# Patient Record
Sex: FEMALE | Race: WHITE | NOT HISPANIC OR LATINO | Employment: FULL TIME | URBAN - METROPOLITAN AREA
[De-identification: names, ages, dates, MRNs, and addresses within clinical notes are randomized per-mention and may not be internally consistent; named-entity substitution may affect disease eponyms.]

---

## 2018-03-30 ENCOUNTER — HOSPITAL ENCOUNTER (OUTPATIENT)
Dept: GENERAL RADIOLOGY | Facility: HOSPITAL | Age: 58
Discharge: HOME | End: 2018-03-30
Attending: FAMILY MEDICINE | Admitting: FAMILY MEDICINE

## 2018-11-06 ENCOUNTER — HOSPITAL ENCOUNTER (OUTPATIENT)
Dept: OTHER | Facility: HOSPITAL | Age: 58
Discharge: HOME | End: 2018-11-06
Attending: FAMILY MEDICINE | Admitting: FAMILY MEDICINE

## 2018-11-19 ENCOUNTER — HOSPITAL ENCOUNTER (OUTPATIENT)
Dept: MAMMOGRAPHY | Facility: HOSPITAL | Age: 58
Discharge: HOME | End: 2018-11-19
Attending: FAMILY MEDICINE | Admitting: FAMILY MEDICINE

## 2018-12-12 ENCOUNTER — HOSPITAL ENCOUNTER (OUTPATIENT)
Dept: MAMMOGRAPHY | Facility: HOSPITAL | Age: 58
Discharge: HOME | End: 2018-12-12
Attending: FAMILY MEDICINE | Admitting: FAMILY MEDICINE

## 2019-01-29 ENCOUNTER — HOSPITAL ENCOUNTER (OUTPATIENT)
Dept: MRI IMAGING | Facility: HOSPITAL | Age: 59
Discharge: HOME | End: 2019-01-29
Attending: SURGERY | Admitting: SURGERY

## 2019-02-05 ENCOUNTER — HOSPITAL ENCOUNTER (OUTPATIENT)
Dept: OTHER | Facility: HOSPITAL | Age: 59
Discharge: HOME | End: 2019-02-05
Attending: SURGERY | Admitting: SURGERY

## 2019-02-12 ENCOUNTER — HOSPITAL ENCOUNTER (OUTPATIENT)
Dept: ONCOLOGY | Facility: HOSPITAL | Age: 59
Discharge: HOME | End: 2019-02-12
Attending: INTERNAL MEDICINE | Admitting: INTERNAL MEDICINE

## 2019-02-12 ENCOUNTER — HOSPITAL ENCOUNTER (OUTPATIENT)
Dept: ONCOLOGY | Facility: CLINIC | Age: 59
Setting detail: INFUSION SERIES
Discharge: HOME | End: 2019-02-12
Attending: INTERNAL MEDICINE | Admitting: INTERNAL MEDICINE

## 2019-02-12 ENCOUNTER — CLINICAL SUPPORT (OUTPATIENT)
Dept: ONCOLOGY | Facility: HOSPITAL | Age: 59
End: 2019-02-12

## 2019-02-12 LAB
ALBUMIN SERPL-MCNC: 4.2 G/DL (ref 3.5–4.8)
ALBUMIN/GLOB SERPL: 1.9 {RATIO} (ref 1–1.7)
ALP SERPL-CCNC: 54 IU/L (ref 32–91)
ALT SERPL-CCNC: 19 IU/L (ref 14–54)
ANION GAP SERPL CALC-SCNC: 14.3 MMOL/L (ref 10–20)
AST SERPL-CCNC: 24 IU/L (ref 15–41)
BILIRUB SERPL-MCNC: 0.5 MG/DL (ref 0.3–1.2)
BUN SERPL-MCNC: 12 MG/DL (ref 8–20)
BUN/CREAT SERPL: 10.9 (ref 5.4–26.2)
CALCIUM SERPL-MCNC: 9.7 MG/DL (ref 8.9–10.3)
CHLORIDE SERPL-SCNC: 100 MMOL/L (ref 101–111)
CONV CO2: 27 MMOL/L (ref 22–32)
CONV TOTAL PROTEIN: 6.4 G/DL (ref 6.1–7.9)
CREAT UR-MCNC: 1.1 MG/DL (ref 0.4–1)
GLOBULIN UR ELPH-MCNC: 2.2 G/DL (ref 2.5–3.8)
GLUCOSE SERPL-MCNC: 92 MG/DL (ref 65–99)
POTASSIUM SERPL-SCNC: 4.3 MMOL/L (ref 3.6–5.1)
SODIUM SERPL-SCNC: 137 MMOL/L (ref 136–144)

## 2019-02-12 NOTE — PROGRESS NOTES
PATIENTS ONCOLOGY RECORD LOCATED IN [facility]      Subjective     Name:  JYOTI PHAM     Date:  2019  Address:  [address] [place] [address] Dr SHANIQUA ADAMES IN [facility]  Home: [unfilled]  :  1960 AGE:  58 y.o.        RECORDS OBTAINED:  Patients Oncology Record is located in [facility]

## 2019-02-25 ENCOUNTER — HOSPITAL ENCOUNTER (OUTPATIENT)
Dept: ONCOLOGY | Facility: CLINIC | Age: 59
Setting detail: INFUSION SERIES
Discharge: HOME | End: 2019-02-25
Attending: INTERNAL MEDICINE | Admitting: INTERNAL MEDICINE

## 2019-02-25 ENCOUNTER — CLINICAL SUPPORT (OUTPATIENT)
Dept: ONCOLOGY | Facility: HOSPITAL | Age: 59
End: 2019-02-25

## 2019-02-25 NOTE — PROGRESS NOTES
PATIENTS ONCOLOGY RECORD LOCATED IN [facility]      Subjective     Name:  JYOTI PHAM     Date:  2019  Address:  [address] [place] [address] Dr SHANIQUA ADAMES IN [facility]  Home: [unfilled]  :  1960 AGE:  58 y.o.        RECORDS OBTAINED:  Patients Oncology Record is located in [facility]

## 2019-03-05 ENCOUNTER — HOSPITAL ENCOUNTER (OUTPATIENT)
Dept: PREADMISSION TESTING | Facility: HOSPITAL | Age: 59
Discharge: HOME | End: 2019-03-05
Attending: SURGERY | Admitting: SURGERY

## 2019-03-05 LAB
ANION GAP SERPL CALC-SCNC: 12.6 MMOL/L (ref 10–20)
BACTERIA SPEC AEROBE CULT: NORMAL
BASOPHILS # BLD AUTO: 0 10*3/UL (ref 0–0.2)
BASOPHILS NFR BLD AUTO: 1 % (ref 0–2)
BUN SERPL-MCNC: 13 MG/DL (ref 8–20)
BUN/CREAT SERPL: 13 (ref 5.4–26.2)
CALCIUM SERPL-MCNC: 9.8 MG/DL (ref 8.9–10.3)
CHLORIDE SERPL-SCNC: 105 MMOL/L (ref 101–111)
CONV CO2: 27 MMOL/L (ref 22–32)
CREAT UR-MCNC: 1 MG/DL (ref 0.4–1)
DIFFERENTIAL METHOD BLD: (no result)
EOSINOPHIL # BLD AUTO: 0.2 10*3/UL (ref 0–0.3)
EOSINOPHIL # BLD AUTO: 2 % (ref 0–3)
ERYTHROCYTE [DISTWIDTH] IN BLOOD BY AUTOMATED COUNT: 14.5 % (ref 11.5–14.5)
GLUCOSE SERPL-MCNC: 90 MG/DL (ref 65–99)
HCT VFR BLD AUTO: 42.5 % (ref 35–49)
HGB BLD-MCNC: 14.2 G/DL (ref 12–15)
LYMPHOCYTES # BLD AUTO: 1.7 10*3/UL (ref 0.8–4.8)
LYMPHOCYTES NFR BLD AUTO: 20 % (ref 18–42)
Lab: NORMAL
MCH RBC QN AUTO: 31.9 PG (ref 26–32)
MCHC RBC AUTO-ENTMCNC: 33.4 G/DL (ref 32–36)
MCV RBC AUTO: 95.5 FL (ref 80–94)
MICRO REPORT STATUS: NORMAL
MONOCYTES # BLD AUTO: 0.5 10*3/UL (ref 0.1–1.3)
MONOCYTES NFR BLD AUTO: 6 % (ref 2–11)
NEUTROPHILS # BLD AUTO: 6 10*3/UL (ref 2.3–8.6)
NEUTROPHILS NFR BLD AUTO: 71 % (ref 50–75)
NRBC BLD AUTO-RTO: 0 /100{WBCS}
NRBC/RBC NFR BLD MANUAL: 0 10*3/UL
PLATELET # BLD AUTO: 223 10*3/UL (ref 150–450)
PMV BLD AUTO: 8.3 FL (ref 7.4–10.4)
POTASSIUM SERPL-SCNC: 4.6 MMOL/L (ref 3.6–5.1)
RBC # BLD AUTO: 4.45 10*6/UL (ref 4–5.4)
SODIUM SERPL-SCNC: 140 MMOL/L (ref 136–144)
SPECIMEN SOURCE: NORMAL
WBC # BLD AUTO: 8.5 10*3/UL (ref 4.5–11.5)

## 2019-03-11 ENCOUNTER — HOSPITAL ENCOUNTER (OUTPATIENT)
Dept: PREOP | Facility: HOSPITAL | Age: 59
Setting detail: HOSPITAL OUTPATIENT SURGERY
Discharge: HOME | End: 2019-03-11
Attending: SURGERY | Admitting: SURGERY

## 2019-03-12 ENCOUNTER — HOSPITAL ENCOUNTER (OUTPATIENT)
Dept: ONCOLOGY | Facility: CLINIC | Age: 59
Setting detail: INFUSION SERIES
Discharge: HOME | End: 2019-03-12
Attending: INTERNAL MEDICINE | Admitting: INTERNAL MEDICINE

## 2019-03-12 ENCOUNTER — HOSPITAL ENCOUNTER (OUTPATIENT)
Dept: ONCOLOGY | Facility: HOSPITAL | Age: 59
Discharge: HOME | End: 2019-03-12
Attending: INTERNAL MEDICINE | Admitting: INTERNAL MEDICINE

## 2019-03-12 ENCOUNTER — HOSPITAL ENCOUNTER (OUTPATIENT)
Dept: MAMMOGRAPHY | Facility: HOSPITAL | Age: 59
Discharge: HOME | End: 2019-03-12
Attending: INTERNAL MEDICINE | Admitting: INTERNAL MEDICINE

## 2019-03-12 ENCOUNTER — CLINICAL SUPPORT (OUTPATIENT)
Dept: ONCOLOGY | Facility: HOSPITAL | Age: 59
End: 2019-03-12

## 2019-03-12 LAB
ALBUMIN SERPL-MCNC: 4.5 G/DL (ref 3.5–4.8)
ALBUMIN/GLOB SERPL: 1.7 {RATIO} (ref 1–1.7)
ALP SERPL-CCNC: 65 IU/L (ref 32–91)
ALT SERPL-CCNC: 28 IU/L (ref 14–54)
ANION GAP SERPL CALC-SCNC: 16.3 MMOL/L (ref 10–20)
AST SERPL-CCNC: 26 IU/L (ref 15–41)
BILIRUB SERPL-MCNC: 0.6 MG/DL (ref 0.3–1.2)
BUN SERPL-MCNC: 11 MG/DL (ref 8–20)
BUN/CREAT SERPL: 12.2 (ref 5.4–26.2)
CALCIUM SERPL-MCNC: 9.7 MG/DL (ref 8.9–10.3)
CHLORIDE SERPL-SCNC: 101 MMOL/L (ref 101–111)
CONV CO2: 27 MMOL/L (ref 22–32)
CONV TOTAL PROTEIN: 7.1 G/DL (ref 6.1–7.9)
CREAT UR-MCNC: 0.9 MG/DL (ref 0.4–1)
GLOBULIN UR ELPH-MCNC: 2.6 G/DL (ref 2.5–3.8)
GLUCOSE SERPL-MCNC: 92 MG/DL (ref 65–99)
POTASSIUM SERPL-SCNC: 4.3 MMOL/L (ref 3.6–5.1)
SODIUM SERPL-SCNC: 140 MMOL/L (ref 136–144)

## 2019-03-12 NOTE — PROGRESS NOTES
PATIENTS ONCOLOGY RECORD LOCATED IN [facility]      Subjective     Name:  JYOTI PHAM     Date:  2019  Address:  [address] [place] [address] Dr SHANIQUA ADAMES IN [facility]  Home: [unfilled]  :  1960 AGE:  58 y.o.        RECORDS OBTAINED:  Patients Oncology Record is located in [facility]

## 2019-03-18 ENCOUNTER — HOSPITAL ENCOUNTER (OUTPATIENT)
Dept: ONCOLOGY | Facility: CLINIC | Age: 59
Setting detail: INFUSION SERIES
Discharge: HOME | End: 2019-03-18
Attending: INTERNAL MEDICINE | Admitting: INTERNAL MEDICINE

## 2019-03-18 ENCOUNTER — CLINICAL SUPPORT (OUTPATIENT)
Dept: ONCOLOGY | Facility: HOSPITAL | Age: 59
End: 2019-03-18

## 2019-03-18 ENCOUNTER — HOSPITAL ENCOUNTER (OUTPATIENT)
Dept: ONCOLOGY | Facility: HOSPITAL | Age: 59
Discharge: HOME | End: 2019-03-18
Attending: INTERNAL MEDICINE | Admitting: INTERNAL MEDICINE

## 2019-03-18 LAB
ALBUMIN SERPL-MCNC: 3.9 G/DL (ref 3.5–4.8)
ALBUMIN/GLOB SERPL: 1.4 {RATIO} (ref 1–1.7)
ALP SERPL-CCNC: 60 IU/L (ref 32–91)
ALT SERPL-CCNC: 22 IU/L (ref 14–54)
ANION GAP SERPL CALC-SCNC: 15 MMOL/L (ref 10–20)
AST SERPL-CCNC: 25 IU/L (ref 15–41)
BILIRUB SERPL-MCNC: 0.4 MG/DL (ref 0.3–1.2)
BUN SERPL-MCNC: 18 MG/DL (ref 8–20)
BUN/CREAT SERPL: 18 (ref 5.4–26.2)
CALCIUM SERPL-MCNC: 9.6 MG/DL (ref 8.9–10.3)
CHLORIDE SERPL-SCNC: 105 MMOL/L (ref 101–111)
CONV CO2: 22 MMOL/L (ref 22–32)
CONV TOTAL PROTEIN: 6.7 G/DL (ref 6.1–7.9)
CREAT BLDA-MCNC: 0.8 MG/DL (ref 0.6–1.3)
CREAT UR-MCNC: 1 MG/DL (ref 0.4–1)
GLOBULIN UR ELPH-MCNC: 2.8 G/DL (ref 2.5–3.8)
GLUCOSE SERPL-MCNC: 113 MG/DL (ref 65–99)
POTASSIUM SERPL-SCNC: 4 MMOL/L (ref 3.6–5.1)
SODIUM SERPL-SCNC: 138 MMOL/L (ref 136–144)

## 2019-03-18 NOTE — PROGRESS NOTES
PATIENTS ONCOLOGY RECORD LOCATED IN [facility]      Subjective     Name:  JYOTI PHAM     Date:  2019  Address:  [address] [place] [address] Dr SHANIQUA ADAMES IN [facility]  Home: [unfilled]  :  1960 AGE:  59 y.o.        RECORDS OBTAINED:  Patients Oncology Record is located in [facility]

## 2019-03-20 ENCOUNTER — HOSPITAL ENCOUNTER (OUTPATIENT)
Dept: ONCOLOGY | Facility: HOSPITAL | Age: 59
Discharge: HOME | End: 2019-03-20
Attending: INTERNAL MEDICINE | Admitting: INTERNAL MEDICINE

## 2019-03-20 LAB — GLUCOSE BLD-MCNC: 90 MG/DL (ref 70–105)

## 2019-03-25 ENCOUNTER — CLINICAL SUPPORT (OUTPATIENT)
Dept: ONCOLOGY | Facility: HOSPITAL | Age: 59
End: 2019-03-25

## 2019-03-25 ENCOUNTER — HOSPITAL ENCOUNTER (OUTPATIENT)
Dept: ONCOLOGY | Facility: CLINIC | Age: 59
Setting detail: INFUSION SERIES
Discharge: HOME | End: 2019-03-25
Attending: INTERNAL MEDICINE | Admitting: INTERNAL MEDICINE

## 2019-03-25 NOTE — PROGRESS NOTES
PATIENTS ONCOLOGY RECORD LOCATED IN [facility]      Subjective     Name:  JYOTI PHAM     Date:  2019  Address:  [address] [place] [address] Dr SHANIQUA ADAMES IN [facility]  Home: [unfilled]  :  1960 AGE:  59 y.o.        RECORDS OBTAINED:  Patients Oncology Record is located in [facility]

## 2019-04-01 ENCOUNTER — CLINICAL SUPPORT (OUTPATIENT)
Dept: ONCOLOGY | Facility: HOSPITAL | Age: 59
End: 2019-04-01

## 2019-04-01 ENCOUNTER — HOSPITAL ENCOUNTER (OUTPATIENT)
Dept: ONCOLOGY | Facility: CLINIC | Age: 59
Setting detail: INFUSION SERIES
Discharge: HOME | End: 2019-04-01
Attending: INTERNAL MEDICINE | Admitting: INTERNAL MEDICINE

## 2019-04-01 NOTE — PROGRESS NOTES
PATIENTS ONCOLOGY RECORD LOCATED IN [facility]      Subjective     Name:  JYOTI PHAM     Date:  2019  Address:  [address] [place] [address] Dr SHANIQUA ADAMES IN [facility]  Home: [unfilled]  :  1960 AGE:  59 y.o.        RECORDS OBTAINED:  Patients Oncology Record is located in [facility]

## 2019-04-08 ENCOUNTER — CLINICAL SUPPORT (OUTPATIENT)
Dept: ONCOLOGY | Facility: HOSPITAL | Age: 59
End: 2019-04-08

## 2019-04-08 ENCOUNTER — HOSPITAL ENCOUNTER (OUTPATIENT)
Dept: ONCOLOGY | Facility: HOSPITAL | Age: 59
Discharge: HOME | End: 2019-04-08
Attending: INTERNAL MEDICINE | Admitting: INTERNAL MEDICINE

## 2019-04-08 ENCOUNTER — HOSPITAL ENCOUNTER (OUTPATIENT)
Dept: ONCOLOGY | Facility: CLINIC | Age: 59
Setting detail: INFUSION SERIES
Discharge: HOME | End: 2019-04-08
Attending: INTERNAL MEDICINE | Admitting: INTERNAL MEDICINE

## 2019-04-08 LAB
ALBUMIN SERPL-MCNC: 3.5 G/DL (ref 3.5–4.8)
ALBUMIN/GLOB SERPL: 1.1 {RATIO} (ref 1–1.7)
ALP SERPL-CCNC: 96 IU/L (ref 32–91)
ALT SERPL-CCNC: 53 IU/L (ref 14–54)
ANION GAP SERPL CALC-SCNC: 14 MMOL/L (ref 10–20)
AST SERPL-CCNC: 30 IU/L (ref 15–41)
BILIRUB SERPL-MCNC: 0.3 MG/DL (ref 0.3–1.2)
BUN SERPL-MCNC: 12 MG/DL (ref 8–20)
BUN/CREAT SERPL: 13.3 (ref 5.4–26.2)
CALCIUM SERPL-MCNC: 9.4 MG/DL (ref 8.9–10.3)
CHLORIDE SERPL-SCNC: 104 MMOL/L (ref 101–111)
CONV CO2: 22 MMOL/L (ref 22–32)
CONV TOTAL PROTEIN: 6.7 G/DL (ref 6.1–7.9)
CREAT BLDA-MCNC: 0.8 MG/DL (ref 0.6–1.3)
CREAT UR-MCNC: 0.9 MG/DL (ref 0.4–1)
GLOBULIN UR ELPH-MCNC: 3.2 G/DL (ref 2.5–3.8)
GLUCOSE SERPL-MCNC: 162 MG/DL (ref 65–99)
POTASSIUM SERPL-SCNC: 4 MMOL/L (ref 3.6–5.1)
SODIUM SERPL-SCNC: 136 MMOL/L (ref 136–144)

## 2019-04-08 NOTE — PROGRESS NOTES
PATIENTS ONCOLOGY RECORD LOCATED IN [facility]      Subjective     Name:  JYOTI PHAM     Date:  2019  Address:  [address] [place] [address] Dr SHANIQUA ADAMES IN [facility]  Home: [unfilled]  :  1960 AGE:  59 y.o.        RECORDS OBTAINED:  Patients Oncology Record is located in [facility]

## 2019-04-09 ENCOUNTER — CLINICAL SUPPORT (OUTPATIENT)
Dept: ONCOLOGY | Facility: HOSPITAL | Age: 59
End: 2019-04-09

## 2019-04-09 ENCOUNTER — HOSPITAL ENCOUNTER (OUTPATIENT)
Dept: ONCOLOGY | Facility: HOSPITAL | Age: 59
Discharge: HOME | End: 2019-04-09
Attending: INTERNAL MEDICINE | Admitting: INTERNAL MEDICINE

## 2019-04-09 ENCOUNTER — HOSPITAL ENCOUNTER (OUTPATIENT)
Dept: ONCOLOGY | Facility: CLINIC | Age: 59
Setting detail: INFUSION SERIES
Discharge: HOME | End: 2019-04-09
Attending: INTERNAL MEDICINE | Admitting: INTERNAL MEDICINE

## 2019-04-09 LAB
ALBUMIN SERPL-MCNC: 3.7 G/DL (ref 3.5–4.8)
ALBUMIN/GLOB SERPL: 1.3 {RATIO} (ref 1–1.7)
ALP SERPL-CCNC: 86 IU/L (ref 32–91)
ALT SERPL-CCNC: 89 IU/L (ref 14–54)
ANION GAP SERPL CALC-SCNC: 15 MMOL/L (ref 10–20)
AST SERPL-CCNC: 63 IU/L (ref 15–41)
BILIRUB SERPL-MCNC: 0.5 MG/DL (ref 0.3–1.2)
BUN SERPL-MCNC: 12 MG/DL (ref 8–20)
BUN/CREAT SERPL: 15 (ref 5.4–26.2)
CALCIUM SERPL-MCNC: 9.1 MG/DL (ref 8.9–10.3)
CHLORIDE SERPL-SCNC: 104 MMOL/L (ref 101–111)
CONV CO2: 23 MMOL/L (ref 22–32)
CONV TOTAL PROTEIN: 6.5 G/DL (ref 6.1–7.9)
CREAT UR-MCNC: 0.8 MG/DL (ref 0.4–1)
GLOBULIN UR ELPH-MCNC: 2.8 G/DL (ref 2.5–3.8)
GLUCOSE SERPL-MCNC: 108 MG/DL (ref 65–99)
POTASSIUM SERPL-SCNC: 4 MMOL/L (ref 3.6–5.1)
SODIUM SERPL-SCNC: 138 MMOL/L (ref 136–144)

## 2019-04-09 NOTE — PROGRESS NOTES
PATIENTS ONCOLOGY RECORD LOCATED IN [facility]      Subjective     Name:  JYOTI PHAM     Date:  2019  Address:  [address] [place] [address] Dr SHANIQUA ADAMES IN [facility]  Home: [unfilled]  :  1960 AGE:  59 y.o.        RECORDS OBTAINED:  Patients Oncology Record is located in [facility]

## 2019-04-15 ENCOUNTER — CLINICAL SUPPORT (OUTPATIENT)
Dept: ONCOLOGY | Facility: HOSPITAL | Age: 59
End: 2019-04-15

## 2019-04-15 ENCOUNTER — HOSPITAL ENCOUNTER (OUTPATIENT)
Dept: ONCOLOGY | Facility: CLINIC | Age: 59
Setting detail: INFUSION SERIES
Discharge: HOME | End: 2019-04-15
Attending: INTERNAL MEDICINE | Admitting: INTERNAL MEDICINE

## 2019-04-15 NOTE — PROGRESS NOTES
PATIENTS ONCOLOGY RECORD LOCATED IN [facility]      Subjective     Name:  JYOTI PHAM     Date:  04/15/2019  Address:  [address] [place] [address] Dr SHANIQUA ADAMES IN [facility]  Home: [unfilled]  :  1960 AGE:  59 y.o.        RECORDS OBTAINED:  Patients Oncology Record is located in [facility]

## 2019-04-22 ENCOUNTER — HOSPITAL ENCOUNTER (OUTPATIENT)
Dept: ONCOLOGY | Facility: CLINIC | Age: 59
Setting detail: INFUSION SERIES
Discharge: HOME | End: 2019-04-22
Attending: INTERNAL MEDICINE | Admitting: INTERNAL MEDICINE

## 2019-04-22 ENCOUNTER — CLINICAL SUPPORT (OUTPATIENT)
Dept: ONCOLOGY | Facility: HOSPITAL | Age: 59
End: 2019-04-22

## 2019-04-22 NOTE — PROGRESS NOTES
PATIENTS ONCOLOGY RECORD LOCATED IN [facility]      Subjective     Name:  JYOTI PHAM     Date:  2019  Address:  [address] [place] [address] Dr SHANIQUA ADAMES IN [facility]  Home: [unfilled]  :  1960 AGE:  59 y.o.        RECORDS OBTAINED:  Patients Oncology Record is located in [facility]

## 2019-04-29 ENCOUNTER — CLINICAL SUPPORT (OUTPATIENT)
Dept: ONCOLOGY | Facility: HOSPITAL | Age: 59
End: 2019-04-29

## 2019-04-29 ENCOUNTER — HOSPITAL ENCOUNTER (OUTPATIENT)
Dept: ONCOLOGY | Facility: CLINIC | Age: 59
Setting detail: INFUSION SERIES
Discharge: HOME | End: 2019-04-29
Attending: INTERNAL MEDICINE | Admitting: INTERNAL MEDICINE

## 2019-04-29 ENCOUNTER — HOSPITAL ENCOUNTER (OUTPATIENT)
Dept: ONCOLOGY | Facility: HOSPITAL | Age: 59
Discharge: HOME | End: 2019-04-29
Attending: INTERNAL MEDICINE | Admitting: INTERNAL MEDICINE

## 2019-04-29 LAB
ALBUMIN SERPL-MCNC: 3.6 G/DL (ref 3.5–4.8)
ALBUMIN/GLOB SERPL: 1.4 {RATIO} (ref 1–1.7)
ALP SERPL-CCNC: 84 IU/L (ref 32–91)
ALT SERPL-CCNC: 32 IU/L (ref 14–54)
ANION GAP SERPL CALC-SCNC: 18.3 MMOL/L (ref 10–20)
AST SERPL-CCNC: 21 IU/L (ref 15–41)
BILIRUB SERPL-MCNC: 0.5 MG/DL (ref 0.3–1.2)
BUN SERPL-MCNC: 14 MG/DL (ref 8–20)
BUN/CREAT SERPL: 15.6 (ref 5.4–26.2)
CALCIUM SERPL-MCNC: 9.6 MG/DL (ref 8.9–10.3)
CHLORIDE SERPL-SCNC: 100 MMOL/L (ref 101–111)
CONV CO2: 22 MMOL/L (ref 22–32)
CONV TOTAL PROTEIN: 6.2 G/DL (ref 6.1–7.9)
CREAT BLDA-MCNC: 0.8 MG/DL (ref 0.6–1.3)
CREAT UR-MCNC: 0.9 MG/DL (ref 0.4–1)
GLOBULIN UR ELPH-MCNC: 2.6 G/DL (ref 2.5–3.8)
GLUCOSE SERPL-MCNC: 134 MG/DL (ref 65–99)
POTASSIUM SERPL-SCNC: 4.3 MMOL/L (ref 3.6–5.1)
SODIUM SERPL-SCNC: 136 MMOL/L (ref 136–144)

## 2019-04-29 NOTE — PROGRESS NOTES
PATIENTS ONCOLOGY RECORD LOCATED IN [facility]      Subjective     Name:  JYOTI PHAM     Date:  2019  Address:  [address] [place] [address] Dr SHANIQUA ADAMES IN [facility]  Home: [unfilled]  :  1960 AGE:  59 y.o.        RECORDS OBTAINED:  Patients Oncology Record is located in [facility]

## 2019-05-06 ENCOUNTER — HOSPITAL ENCOUNTER (OUTPATIENT)
Dept: ONCOLOGY | Facility: CLINIC | Age: 59
Setting detail: INFUSION SERIES
Discharge: HOME | End: 2019-05-06
Attending: INTERNAL MEDICINE | Admitting: INTERNAL MEDICINE

## 2019-05-06 ENCOUNTER — CLINICAL SUPPORT (OUTPATIENT)
Dept: ONCOLOGY | Facility: HOSPITAL | Age: 59
End: 2019-05-06

## 2019-05-06 NOTE — PROGRESS NOTES
PATIENTS ONCOLOGY RECORD LOCATED IN [facility]      Subjective     Name:  JYOTI PHAM     Date:  2019  Address:  [address] [place] [address] Dr SHANIQUA ADAMES IN [facility]  Home: [unfilled]  :  1960 AGE:  59 y.o.        RECORDS OBTAINED:  Patients Oncology Record is located in [facility]

## 2019-05-07 ENCOUNTER — HOSPITAL ENCOUNTER (OUTPATIENT)
Dept: ONCOLOGY | Facility: HOSPITAL | Age: 59
Discharge: HOME | End: 2019-05-07
Attending: INTERNAL MEDICINE | Admitting: INTERNAL MEDICINE

## 2019-05-07 ENCOUNTER — HOSPITAL ENCOUNTER (OUTPATIENT)
Dept: ONCOLOGY | Facility: CLINIC | Age: 59
Setting detail: INFUSION SERIES
Discharge: HOME | End: 2019-05-07
Attending: INTERNAL MEDICINE | Admitting: INTERNAL MEDICINE

## 2019-05-07 ENCOUNTER — CLINICAL SUPPORT (OUTPATIENT)
Dept: ONCOLOGY | Facility: HOSPITAL | Age: 59
End: 2019-05-07

## 2019-05-07 LAB
ALBUMIN SERPL-MCNC: 3.6 G/DL (ref 3.5–4.8)
ALBUMIN/GLOB SERPL: 1.3 {RATIO} (ref 1–1.7)
ALP SERPL-CCNC: 98 IU/L (ref 32–91)
ALT SERPL-CCNC: 25 IU/L (ref 14–54)
ANION GAP SERPL CALC-SCNC: 13.8 MMOL/L (ref 10–20)
AST SERPL-CCNC: 24 IU/L (ref 15–41)
BILIRUB SERPL-MCNC: 0.3 MG/DL (ref 0.3–1.2)
BUN SERPL-MCNC: 13 MG/DL (ref 8–20)
BUN/CREAT SERPL: 14.4 (ref 5.4–26.2)
CALCIUM SERPL-MCNC: 9 MG/DL (ref 8.9–10.3)
CHLORIDE SERPL-SCNC: 103 MMOL/L (ref 101–111)
CONV CO2: 24 MMOL/L (ref 22–32)
CONV TOTAL PROTEIN: 6.3 G/DL (ref 6.1–7.9)
CREAT UR-MCNC: 0.9 MG/DL (ref 0.4–1)
GLOBULIN UR ELPH-MCNC: 2.7 G/DL (ref 2.5–3.8)
GLUCOSE SERPL-MCNC: 106 MG/DL (ref 65–99)
POTASSIUM SERPL-SCNC: 3.8 MMOL/L (ref 3.6–5.1)
SODIUM SERPL-SCNC: 137 MMOL/L (ref 136–144)

## 2019-05-07 NOTE — PROGRESS NOTES
PATIENTS ONCOLOGY RECORD LOCATED IN [facility]      Subjective     Name:  JYOTI PHAM     Date:  2019  Address:  [address] [place] [address] Dr SHANIQUA ADAMES IN [facility]  Home: [unfilled]  :  1960 AGE:  59 y.o.        RECORDS OBTAINED:  Patients Oncology Record is located in [facility]

## 2019-05-13 ENCOUNTER — CLINICAL SUPPORT (OUTPATIENT)
Dept: ONCOLOGY | Facility: HOSPITAL | Age: 59
End: 2019-05-13

## 2019-05-13 ENCOUNTER — HOSPITAL ENCOUNTER (OUTPATIENT)
Dept: ONCOLOGY | Facility: CLINIC | Age: 59
Setting detail: INFUSION SERIES
Discharge: HOME | End: 2019-05-13
Attending: INTERNAL MEDICINE | Admitting: INTERNAL MEDICINE

## 2019-05-13 NOTE — PROGRESS NOTES
PATIENTS ONCOLOGY RECORD LOCATED IN [facility]      Subjective     Name:  JYOTI PHAM     Date:  2019  Address:  [address] [place] [address] Dr SHANIQUA ADAMES IN [facility]  Home: [unfilled]  :  1960 AGE:  59 y.o.        RECORDS OBTAINED:  Patients Oncology Record is located in [facility]

## 2019-05-20 ENCOUNTER — HOSPITAL ENCOUNTER (OUTPATIENT)
Dept: ONCOLOGY | Facility: HOSPITAL | Age: 59
Discharge: HOME | End: 2019-05-20
Attending: INTERNAL MEDICINE | Admitting: INTERNAL MEDICINE

## 2019-05-20 ENCOUNTER — HOSPITAL ENCOUNTER (OUTPATIENT)
Dept: ONCOLOGY | Facility: CLINIC | Age: 59
Setting detail: INFUSION SERIES
Discharge: HOME | End: 2019-05-20
Attending: INTERNAL MEDICINE | Admitting: INTERNAL MEDICINE

## 2019-05-20 ENCOUNTER — CLINICAL SUPPORT (OUTPATIENT)
Dept: ONCOLOGY | Facility: HOSPITAL | Age: 59
End: 2019-05-20

## 2019-05-20 LAB
ALBUMIN SERPL-MCNC: 3.3 G/DL (ref 3.5–4.8)
ALBUMIN/GLOB SERPL: 1.1 {RATIO} (ref 1–1.7)
ALP SERPL-CCNC: 75 IU/L (ref 32–91)
ALT SERPL-CCNC: 26 IU/L (ref 14–54)
ANION GAP SERPL CALC-SCNC: 13.2 MMOL/L (ref 10–20)
AST SERPL-CCNC: 18 IU/L (ref 15–41)
BILIRUB SERPL-MCNC: 0.5 MG/DL (ref 0.3–1.2)
BUN SERPL-MCNC: 11 MG/DL (ref 8–20)
BUN/CREAT SERPL: 13.8 (ref 5.4–26.2)
CALCIUM SERPL-MCNC: 9.2 MG/DL (ref 8.9–10.3)
CHLORIDE SERPL-SCNC: 102 MMOL/L (ref 101–111)
CONV CO2: 25 MMOL/L (ref 22–32)
CONV TOTAL PROTEIN: 6.4 G/DL (ref 6.1–7.9)
CREAT BLDA-MCNC: 0.8 MG/DL (ref 0.6–1.3)
CREAT UR-MCNC: 0.8 MG/DL (ref 0.4–1)
GLOBULIN UR ELPH-MCNC: 3.1 G/DL (ref 2.5–3.8)
GLUCOSE SERPL-MCNC: 119 MG/DL (ref 65–99)
POTASSIUM SERPL-SCNC: 4.2 MMOL/L (ref 3.6–5.1)
SODIUM SERPL-SCNC: 136 MMOL/L (ref 136–144)

## 2019-05-20 NOTE — PROGRESS NOTES
PATIENTS ONCOLOGY RECORD LOCATED IN [facility]      Subjective     Name:  JYOTI PHAM     Date:  2019  Address:  [address] [place] [address] Dr SHANIQUA ADAMES IN [facility]  Home: [unfilled]  :  1960 AGE:  59 y.o.        RECORDS OBTAINED:  Patients Oncology Record is located in [facility]

## 2019-05-22 VITALS — BODY MASS INDEX: 19.49 KG/M2 | WEIGHT: 117 LBS | HEIGHT: 65 IN

## 2019-05-29 ENCOUNTER — CLINICAL SUPPORT (OUTPATIENT)
Dept: ONCOLOGY | Facility: HOSPITAL | Age: 59
End: 2019-05-29

## 2019-05-29 ENCOUNTER — HOSPITAL ENCOUNTER (OUTPATIENT)
Dept: ONCOLOGY | Facility: CLINIC | Age: 59
Setting detail: INFUSION SERIES
Discharge: HOME | End: 2019-05-29
Attending: INTERNAL MEDICINE | Admitting: INTERNAL MEDICINE

## 2019-05-29 NOTE — PROGRESS NOTES
PATIENTS ONCOLOGY RECORD LOCATED IN [facility]      Subjective     Name:  JYOTI PHAM     Date:  2019  Address:  [address] [place] [address] Dr SHANIQUA ADAMES IN [facility]  Home: [unfilled]  :  1960 AGE:  59 y.o.        RECORDS OBTAINED:  Patients Oncology Record is located in [facility]

## 2019-06-04 ENCOUNTER — HOSPITAL ENCOUNTER (OUTPATIENT)
Dept: ONCOLOGY | Facility: HOSPITAL | Age: 59
Discharge: HOME | End: 2019-06-04
Attending: INTERNAL MEDICINE | Admitting: INTERNAL MEDICINE

## 2019-06-04 ENCOUNTER — HOSPITAL ENCOUNTER (OUTPATIENT)
Dept: ONCOLOGY | Facility: CLINIC | Age: 59
Setting detail: INFUSION SERIES
Discharge: HOME | End: 2019-06-04
Attending: INTERNAL MEDICINE | Admitting: INTERNAL MEDICINE

## 2019-06-04 LAB
ALBUMIN SERPL-MCNC: 3 G/DL (ref 3.5–4.8)
ALBUMIN/GLOB SERPL: 1.1 {RATIO} (ref 1–1.7)
ALP SERPL-CCNC: 85 IU/L (ref 32–91)
ALT SERPL-CCNC: 30 IU/L (ref 14–54)
ANION GAP SERPL CALC-SCNC: 14.6 MMOL/L (ref 10–20)
AST SERPL-CCNC: 30 IU/L (ref 15–41)
BILIRUB SERPL-MCNC: 0.6 MG/DL (ref 0.3–1.2)
BUN SERPL-MCNC: 9 MG/DL (ref 8–20)
BUN/CREAT SERPL: 11.3 (ref 5.4–26.2)
CALCIUM SERPL-MCNC: 8.5 MG/DL (ref 8.9–10.3)
CHLORIDE SERPL-SCNC: 101 MMOL/L (ref 101–111)
CONV CO2: 24 MMOL/L (ref 22–32)
CONV TOTAL PROTEIN: 5.7 G/DL (ref 6.1–7.9)
CREAT UR-MCNC: 0.8 MG/DL (ref 0.4–1)
GLOBULIN UR ELPH-MCNC: 2.7 G/DL (ref 2.5–3.8)
GLUCOSE SERPL-MCNC: 126 MG/DL (ref 65–99)
POTASSIUM SERPL-SCNC: 3.6 MMOL/L (ref 3.6–5.1)
SODIUM SERPL-SCNC: 136 MMOL/L (ref 136–144)

## 2019-06-10 ENCOUNTER — HOSPITAL ENCOUNTER (OUTPATIENT)
Dept: ONCOLOGY | Facility: CLINIC | Age: 59
Setting detail: INFUSION SERIES
Discharge: HOME | End: 2019-06-10
Attending: INTERNAL MEDICINE | Admitting: INTERNAL MEDICINE

## 2019-06-10 ENCOUNTER — HOSPITAL ENCOUNTER (OUTPATIENT)
Dept: ONCOLOGY | Facility: HOSPITAL | Age: 59
Discharge: HOME | End: 2019-06-10
Attending: INTERNAL MEDICINE | Admitting: INTERNAL MEDICINE

## 2019-06-10 LAB — CREAT BLDA-MCNC: 0.7 MG/DL (ref 0.6–1.3)

## 2019-06-11 LAB
ALBUMIN SERPL-MCNC: 3.2 G/DL (ref 3.5–4.8)
ALBUMIN/GLOB SERPL: 1.3 {RATIO} (ref 1–1.7)
ALP SERPL-CCNC: 73 IU/L (ref 32–91)
ALT SERPL-CCNC: 25 IU/L (ref 14–54)
ANION GAP SERPL CALC-SCNC: 16.1 MMOL/L (ref 10–20)
AST SERPL-CCNC: 24 IU/L (ref 15–41)
BILIRUB SERPL-MCNC: 0.3 MG/DL (ref 0.3–1.2)
BUN SERPL-MCNC: 15 MG/DL (ref 8–20)
BUN/CREAT SERPL: 18.8 (ref 5.4–26.2)
CALCIUM SERPL-MCNC: 9.1 MG/DL (ref 8.9–10.3)
CHLORIDE SERPL-SCNC: 104 MMOL/L (ref 101–111)
CONV CO2: 22 MMOL/L (ref 22–32)
CONV TOTAL PROTEIN: 5.7 G/DL (ref 6.1–7.9)
CREAT UR-MCNC: 0.8 MG/DL (ref 0.4–1)
GLOBULIN UR ELPH-MCNC: 2.5 G/DL (ref 2.5–3.8)
GLUCOSE SERPL-MCNC: 128 MG/DL (ref 65–99)
POTASSIUM SERPL-SCNC: 4.1 MMOL/L (ref 3.6–5.1)
SODIUM SERPL-SCNC: 138 MMOL/L (ref 136–144)

## 2019-06-13 VITALS — WEIGHT: 115 LBS | HEIGHT: 65 IN | BODY MASS INDEX: 19.16 KG/M2

## 2019-06-13 DIAGNOSIS — C50.912 INFILTRATING DUCTAL CARCINOMA OF LEFT BREAST (HCC): ICD-10-CM

## 2019-06-13 PROBLEM — C50.919 INFILTRATING DUCTAL CARCINOMA OF BREAST (HCC): Status: ACTIVE | Noted: 2019-01-24

## 2019-06-13 RX ORDER — SODIUM CHLORIDE 9 MG/ML
250 INJECTION, SOLUTION INTRAVENOUS ONCE
Status: CANCELLED | OUTPATIENT
Start: 2019-06-17

## 2019-06-14 ENCOUNTER — TRANSCRIBE ORDERS (OUTPATIENT)
Dept: ADMINISTRATIVE | Facility: HOSPITAL | Age: 59
End: 2019-06-14

## 2019-06-14 DIAGNOSIS — Z51.11 ENCOUNTER FOR CHEMOTHERAPY MANAGEMENT: Primary | ICD-10-CM

## 2019-06-14 DIAGNOSIS — C50.912 INFILTRATING DUCTAL CARCINOMA OF LEFT BREAST (HCC): Primary | ICD-10-CM

## 2019-06-17 ENCOUNTER — HOSPITAL ENCOUNTER (OUTPATIENT)
Dept: ONCOLOGY | Facility: HOSPITAL | Age: 59
Setting detail: INFUSION SERIES
Discharge: HOME | End: 2019-06-17

## 2019-06-17 ENCOUNTER — TELEPHONE (OUTPATIENT)
Dept: ONCOLOGY | Facility: CLINIC | Age: 59
End: 2019-06-17

## 2019-06-17 VITALS
HEART RATE: 109 BPM | RESPIRATION RATE: 18 BRPM | HEIGHT: 67 IN | BODY MASS INDEX: 18.21 KG/M2 | SYSTOLIC BLOOD PRESSURE: 134 MMHG | WEIGHT: 116 LBS | TEMPERATURE: 98.3 F | DIASTOLIC BLOOD PRESSURE: 66 MMHG

## 2019-06-17 DIAGNOSIS — C50.912 INFILTRATING DUCTAL CARCINOMA OF LEFT BREAST (HCC): Primary | ICD-10-CM

## 2019-06-17 PROCEDURE — 96413 CHEMO IV INFUSION 1 HR: CPT

## 2019-06-17 PROCEDURE — 25010000002 TRASTUZUMAB PER 10 MG: Performed by: NURSE PRACTITIONER

## 2019-06-17 PROCEDURE — 96523 IRRIG DRUG DELIVERY DEVICE: CPT | Performed by: INTERNAL MEDICINE

## 2019-06-17 RX ADMIN — TRASTUZUMAB 100 MG: 150 INJECTION, POWDER, LYOPHILIZED, FOR SOLUTION INTRAVENOUS at 12:03

## 2019-06-17 NOTE — TELEPHONE ENCOUNTER
Had spoken with insurance about PA that Flynn said was needed. Spoke with Willian at [facility] ([phone]). He said drug is not on formulary and they will not cover at all, so doesn't even need PA. He did say that the doctor can send LMN, but no guarantee-they MIGHT review.    Patient inquired as to status. Explained what had taken place as noted above. Pt said her main concern is fatigue. Is there something to help with this that insurance might pay. Informed pt I will check with Dr. Burrell and get back with her. Pt gave v/u.

## 2019-06-21 RX ORDER — SODIUM CHLORIDE 0.9 % (FLUSH) 0.9 %
10 SYRINGE (ML) INJECTION AS NEEDED
Status: CANCELLED | OUTPATIENT
Start: 2019-06-24

## 2019-06-24 ENCOUNTER — HOSPITAL ENCOUNTER (OUTPATIENT)
Dept: ONCOLOGY | Facility: HOSPITAL | Age: 59
Setting detail: INFUSION SERIES
Discharge: HOME | End: 2019-06-24

## 2019-06-24 VITALS
TEMPERATURE: 98.2 F | SYSTOLIC BLOOD PRESSURE: 111 MMHG | WEIGHT: 113 LBS | DIASTOLIC BLOOD PRESSURE: 71 MMHG | HEART RATE: 103 BPM | HEIGHT: 65 IN | BODY MASS INDEX: 18.83 KG/M2 | RESPIRATION RATE: 18 BRPM

## 2019-06-24 DIAGNOSIS — C50.912 INFILTRATING DUCTAL CARCINOMA OF LEFT BREAST (HCC): ICD-10-CM

## 2019-06-24 DIAGNOSIS — C50.912 INFILTRATING DUCTAL CARCINOMA OF LEFT BREAST (HCC): Primary | ICD-10-CM

## 2019-06-24 PROCEDURE — 25010000002 TRASTUZUMAB PER 10 MG: Performed by: NURSE PRACTITIONER

## 2019-06-24 PROCEDURE — 96413 CHEMO IV INFUSION 1 HR: CPT | Performed by: INTERNAL MEDICINE

## 2019-06-24 RX ORDER — PROMETHAZINE HYDROCHLORIDE 25 MG/1
TABLET ORAL
Refills: 2 | COMMUNITY
Start: 2019-05-01 | End: 2019-08-14

## 2019-06-24 RX ORDER — ONDANSETRON HYDROCHLORIDE 8 MG/1
TABLET, FILM COATED ORAL
Refills: 2 | COMMUNITY
Start: 2019-05-01 | End: 2019-08-14

## 2019-06-24 RX ORDER — SODIUM CHLORIDE 0.9 % (FLUSH) 0.9 %
10 SYRINGE (ML) INJECTION AS NEEDED
Status: DISCONTINUED | OUTPATIENT
Start: 2019-06-24 | End: 2019-06-25 | Stop reason: HOSPADM

## 2019-06-24 RX ORDER — SODIUM CHLORIDE 9 MG/ML
250 INJECTION, SOLUTION INTRAVENOUS ONCE
Status: CANCELLED | OUTPATIENT
Start: 2019-06-24

## 2019-06-24 RX ORDER — SODIUM CHLORIDE 0.9 % (FLUSH) 0.9 %
10 SYRINGE (ML) INJECTION AS NEEDED
Status: CANCELLED | OUTPATIENT
Start: 2019-06-24

## 2019-06-24 RX ADMIN — TRASTUZUMAB 100 MG: 150 INJECTION, POWDER, LYOPHILIZED, FOR SOLUTION INTRAVENOUS at 12:04

## 2019-06-24 RX ADMIN — HEPARIN 500 UNITS: 100 SYRINGE at 12:43

## 2019-06-24 RX ADMIN — Medication 10 ML: at 12:43

## 2019-06-26 ENCOUNTER — TELEPHONE (OUTPATIENT)
Dept: ONCOLOGY | Facility: CLINIC | Age: 59
End: 2019-06-26

## 2019-07-02 ENCOUNTER — HOSPITAL ENCOUNTER (OUTPATIENT)
Dept: CARDIOLOGY | Facility: HOSPITAL | Age: 59
Discharge: HOME | End: 2019-07-02
Admitting: INTERNAL MEDICINE

## 2019-07-02 VITALS
BODY MASS INDEX: 18.99 KG/M2 | HEIGHT: 65 IN | SYSTOLIC BLOOD PRESSURE: 137 MMHG | DIASTOLIC BLOOD PRESSURE: 73 MMHG | WEIGHT: 114 LBS

## 2019-07-02 DIAGNOSIS — Z51.11 ENCOUNTER FOR CHEMOTHERAPY MANAGEMENT: ICD-10-CM

## 2019-07-02 LAB
BH CV ECHO MEAS - ACS: 1.6 CM
BH CV ECHO MEAS - AO MAX PG (FULL): 4.5 MMHG
BH CV ECHO MEAS - AO MAX PG: 8.2 MMHG
BH CV ECHO MEAS - AO MEAN PG (FULL): 3.4 MMHG
BH CV ECHO MEAS - AO MEAN PG: 5.1 MMHG
BH CV ECHO MEAS - AO ROOT AREA (BSA CORRECTED): 1.7
BH CV ECHO MEAS - AO ROOT AREA: 5.3 CM^2
BH CV ECHO MEAS - AO ROOT DIAM: 2.6 CM
BH CV ECHO MEAS - AO V2 MAX: 143.4 CM/SEC
BH CV ECHO MEAS - AO V2 MEAN: 110.4 CM/SEC
BH CV ECHO MEAS - AO V2 VTI: 29.8 CM
BH CV ECHO MEAS - ASC AORTA: 2.3 CM
BH CV ECHO MEAS - AVA(I,A): 1.8 CM^2
BH CV ECHO MEAS - AVA(I,D): 1.8 CM^2
BH CV ECHO MEAS - AVA(V,A): 1.9 CM^2
BH CV ECHO MEAS - AVA(V,D): 1.9 CM^2
BH CV ECHO MEAS - BSA(HAYCOCK): 1.5 M^2
BH CV ECHO MEAS - BSA: 1.6 M^2
BH CV ECHO MEAS - BZI_BMI: 19 KILOGRAMS/M^2
BH CV ECHO MEAS - BZI_METRIC_HEIGHT: 165.1 CM
BH CV ECHO MEAS - BZI_METRIC_WEIGHT: 51.7 KG
BH CV ECHO MEAS - EDV(CUBED): 48.5 ML
BH CV ECHO MEAS - EDV(MOD-SP2): 57 ML
BH CV ECHO MEAS - EDV(MOD-SP4): 39.8 ML
BH CV ECHO MEAS - EDV(TEICH): 56.2 ML
BH CV ECHO MEAS - EF(CUBED): 62.9 %
BH CV ECHO MEAS - EF(MOD-BP): 54 %
BH CV ECHO MEAS - EF(MOD-SP2): 57.4 %
BH CV ECHO MEAS - EF(MOD-SP4): 54.3 %
BH CV ECHO MEAS - EF(TEICH): 55.3 %
BH CV ECHO MEAS - ESV(CUBED): 18 ML
BH CV ECHO MEAS - ESV(MOD-SP2): 24.3 ML
BH CV ECHO MEAS - ESV(MOD-SP4): 18.2 ML
BH CV ECHO MEAS - ESV(TEICH): 25.1 ML
BH CV ECHO MEAS - FS: 28.1 %
BH CV ECHO MEAS - IVS/LVPW: 1.2
BH CV ECHO MEAS - IVSD: 1.1 CM
BH CV ECHO MEAS - LA DIMENSION(2D): 3.2 CM
BH CV ECHO MEAS - LV DIASTOLIC VOL/BSA (35-75): 25.6 ML/M^2
BH CV ECHO MEAS - LV MASS(C)D: 108.2 GRAMS
BH CV ECHO MEAS - LV MASS(C)DI: 69.5 GRAMS/M^2
BH CV ECHO MEAS - LV MAX PG: 3.7 MMHG
BH CV ECHO MEAS - LV MEAN PG: 1.7 MMHG
BH CV ECHO MEAS - LV SYSTOLIC VOL/BSA (12-30): 11.7 ML/M^2
BH CV ECHO MEAS - LV V1 MAX: 96.4 CM/SEC
BH CV ECHO MEAS - LV V1 MEAN: 59.2 CM/SEC
BH CV ECHO MEAS - LV V1 VTI: 18.7 CM
BH CV ECHO MEAS - LVIDD: 3.6 CM
BH CV ECHO MEAS - LVIDS: 2.6 CM
BH CV ECHO MEAS - LVOT AREA: 2.9 CM^2
BH CV ECHO MEAS - LVOT DIAM: 1.9 CM
BH CV ECHO MEAS - LVPWD: 0.88 CM
BH CV ECHO MEAS - MV A MAX VEL: 86.4 CM/SEC
BH CV ECHO MEAS - MV DEC SLOPE: 543.9 CM/SEC^2
BH CV ECHO MEAS - MV DEC TIME: 0.2 SEC
BH CV ECHO MEAS - MV E MAX VEL: 106.3 CM/SEC
BH CV ECHO MEAS - MV E/A: 1.2
BH CV ECHO MEAS - MV MAX PG: 6.4 MMHG
BH CV ECHO MEAS - MV MEAN PG: 3.7 MMHG
BH CV ECHO MEAS - MV V2 MAX: 126.2 CM/SEC
BH CV ECHO MEAS - MV V2 MEAN: 93.6 CM/SEC
BH CV ECHO MEAS - MV V2 VTI: 24.9 CM
BH CV ECHO MEAS - MVA(VTI): 2.2 CM^2
BH CV ECHO MEAS - PA ACC TIME: 0.1 SEC
BH CV ECHO MEAS - PA MAX PG (FULL): 3.7 MMHG
BH CV ECHO MEAS - PA MAX PG: 8 MMHG
BH CV ECHO MEAS - PA MEAN PG (FULL): 1.6 MMHG
BH CV ECHO MEAS - PA MEAN PG: 4.1 MMHG
BH CV ECHO MEAS - PA PR(ACCEL): 32.3 MMHG
BH CV ECHO MEAS - PA V2 MAX: 141.3 CM/SEC
BH CV ECHO MEAS - PA V2 MEAN: 96.4 CM/SEC
BH CV ECHO MEAS - PA V2 VTI: 27.7 CM
BH CV ECHO MEAS - RAP SYSTOLE: 5 MMHG
BH CV ECHO MEAS - RV MAX PG: 4.3 MMHG
BH CV ECHO MEAS - RV MEAN PG: 2.5 MMHG
BH CV ECHO MEAS - RV V1 MAX: 103.9 CM/SEC
BH CV ECHO MEAS - RV V1 MEAN: 76 CM/SEC
BH CV ECHO MEAS - RV V1 VTI: 20.8 CM
BH CV ECHO MEAS - RVDD: 2.5 CM
BH CV ECHO MEAS - RVSP: 22.5 MMHG
BH CV ECHO MEAS - SI(AO): 101.7 ML/M^2
BH CV ECHO MEAS - SI(CUBED): 19.6 ML/M^2
BH CV ECHO MEAS - SI(LVOT): 34.7 ML/M^2
BH CV ECHO MEAS - SI(MOD-SP2): 21 ML/M^2
BH CV ECHO MEAS - SI(MOD-SP4): 13.9 ML/M^2
BH CV ECHO MEAS - SI(TEICH): 19.9 ML/M^2
BH CV ECHO MEAS - SV(AO): 158.4 ML
BH CV ECHO MEAS - SV(CUBED): 30.5 ML
BH CV ECHO MEAS - SV(LVOT): 54.1 ML
BH CV ECHO MEAS - SV(MOD-SP2): 32.8 ML
BH CV ECHO MEAS - SV(MOD-SP4): 21.6 ML
BH CV ECHO MEAS - SV(TEICH): 31 ML
BH CV ECHO MEAS - TR MAX VEL: 209.3 CM/SEC
BH CV VAS BP RIGHT ARM: NORMAL MMHG
LV EF 2D ECHO EST: 55 %
MAXIMAL PREDICTED HEART RATE: 163 BPM
STRESS TARGET HR: 139 BPM

## 2019-07-02 PROCEDURE — 93306 TTE W/DOPPLER COMPLETE: CPT

## 2019-07-02 PROCEDURE — 0399T HC MYOCARDL STRAIN IMAG QUAN ASSMT PER SESS: CPT

## 2019-07-08 ENCOUNTER — TELEPHONE (OUTPATIENT)
Dept: ONCOLOGY | Facility: CLINIC | Age: 59
End: 2019-07-08

## 2019-07-10 ENCOUNTER — HOSPITAL ENCOUNTER (OUTPATIENT)
Dept: ONCOLOGY | Facility: HOSPITAL | Age: 59
Setting detail: INFUSION SERIES
Discharge: HOME | End: 2019-07-10

## 2019-07-10 VITALS
TEMPERATURE: 97.8 F | WEIGHT: 114 LBS | SYSTOLIC BLOOD PRESSURE: 129 MMHG | BODY MASS INDEX: 18.99 KG/M2 | HEART RATE: 78 BPM | HEIGHT: 65 IN | RESPIRATION RATE: 16 BRPM | DIASTOLIC BLOOD PRESSURE: 74 MMHG

## 2019-07-10 DIAGNOSIS — C50.912 INFILTRATING DUCTAL CARCINOMA OF LEFT BREAST (HCC): Primary | ICD-10-CM

## 2019-07-10 DIAGNOSIS — Z51.81 ENCOUNTER FOR MONITORING CARDIOTOXIC DRUG THERAPY: Primary | ICD-10-CM

## 2019-07-10 DIAGNOSIS — Z79.899 ENCOUNTER FOR MONITORING CARDIOTOXIC DRUG THERAPY: Primary | ICD-10-CM

## 2019-07-10 LAB
ALBUMIN SERPL-MCNC: 3.8 G/DL (ref 3.5–4.8)
ALBUMIN/GLOB SERPL: 1.4 G/DL (ref 1–1.7)
ALP SERPL-CCNC: 52 U/L (ref 32–91)
ALT SERPL W P-5'-P-CCNC: 18 U/L (ref 14–54)
ANION GAP SERPL CALCULATED.3IONS-SCNC: 13.2 MMOL/L (ref 5–15)
AST SERPL-CCNC: 21 U/L (ref 15–41)
BASOPHILS # BLD AUTO: 0.01 10*3/MM3 (ref 0–0.2)
BASOPHILS NFR BLD AUTO: 0.1 % (ref 0–1.5)
BILIRUB SERPL-MCNC: 0.6 MG/DL (ref 0.3–1.2)
BUN BLD-MCNC: 15 MG/DL (ref 8–20)
BUN/CREAT SERPL: 18.8 (ref 5.4–26.2)
CALCIUM SPEC-SCNC: 9.6 MG/DL (ref 8.9–10.3)
CHLORIDE SERPL-SCNC: 105 MMOL/L (ref 101–111)
CO2 SERPL-SCNC: 23 MMOL/L (ref 22–32)
CREAT BLD-MCNC: 0.8 MG/DL (ref 0.4–1)
CREAT BLDA-MCNC: 0.7 MG/DL (ref 0.6–1.3)
DEPRECATED RDW RBC AUTO: 58.6 FL (ref 37–54)
EOSINOPHIL # BLD AUTO: 0.02 10*3/MM3 (ref 0–0.4)
EOSINOPHIL NFR BLD AUTO: 0.2 % (ref 0.3–6.2)
ERYTHROCYTE [DISTWIDTH] IN BLOOD BY AUTOMATED COUNT: 15.3 % (ref 12.3–15.4)
GFR SERPL CREATININE-BSD FRML MDRD: 74 ML/MIN/1.73
GLOBULIN UR ELPH-MCNC: 2.7 GM/DL (ref 2.5–3.8)
GLUCOSE BLD-MCNC: 90 MG/DL (ref 65–99)
HCT VFR BLD AUTO: 35.2 % (ref 34–46.6)
HGB BLD-MCNC: 11.2 G/DL (ref 12–15.9)
LYMPHOCYTES # BLD AUTO: 1.47 10*3/MM3 (ref 0.7–3.1)
LYMPHOCYTES NFR BLD AUTO: 16.3 % (ref 19.6–45.3)
MCH RBC QN AUTO: 33.5 PG (ref 26.6–33)
MCHC RBC AUTO-ENTMCNC: 31.8 G/DL (ref 31.5–35.7)
MCV RBC AUTO: 105.4 FL (ref 79–97)
MONOCYTES # BLD AUTO: 0.95 10*3/MM3 (ref 0.1–0.9)
MONOCYTES NFR BLD AUTO: 10.5 % (ref 5–12)
NEUTROPHILS # BLD AUTO: 6.57 10*3/MM3 (ref 1.7–7)
NEUTROPHILS NFR BLD AUTO: 72.9 % (ref 42.7–76)
PLATELET # BLD AUTO: 190 10*3/MM3 (ref 140–450)
PMV BLD AUTO: 10.1 FL (ref 6–12)
POTASSIUM BLD-SCNC: 4.2 MMOL/L (ref 3.6–5.1)
PROT SERPL-MCNC: 6.5 G/DL (ref 6.1–7.9)
RBC # BLD AUTO: 3.34 10*6/MM3 (ref 3.77–5.28)
SODIUM BLD-SCNC: 137 MMOL/L (ref 136–144)
WBC NRBC COR # BLD: 9.02 10*3/MM3 (ref 3.4–10.8)

## 2019-07-10 PROCEDURE — 96377 APPLICATON ON-BODY INJECTOR: CPT | Performed by: INTERNAL MEDICINE

## 2019-07-10 PROCEDURE — 25010000002 PALONOSETRON 0.25 MG/5ML SOLUTION PREFILLED SYRINGE: Performed by: NURSE PRACTITIONER

## 2019-07-10 PROCEDURE — 96367 TX/PROPH/DG ADDL SEQ IV INF: CPT | Performed by: INTERNAL MEDICINE

## 2019-07-10 PROCEDURE — 25010000002 TRASTUZUMAB PER 10 MG: Performed by: NURSE PRACTITIONER

## 2019-07-10 PROCEDURE — 82565 ASSAY OF CREATININE: CPT

## 2019-07-10 PROCEDURE — 25010000002 PEGFILGRASTIM 6 MG/0.6ML PREFILLED SYRINGE KIT: Performed by: NURSE PRACTITIONER

## 2019-07-10 PROCEDURE — 25010000002 CARBOPLATIN PER 50 MG: Performed by: NURSE PRACTITIONER

## 2019-07-10 PROCEDURE — 80053 COMPREHEN METABOLIC PANEL: CPT | Performed by: INTERNAL MEDICINE

## 2019-07-10 PROCEDURE — 96375 TX/PRO/DX INJ NEW DRUG ADDON: CPT | Performed by: INTERNAL MEDICINE

## 2019-07-10 PROCEDURE — 85025 COMPLETE CBC W/AUTO DIFF WBC: CPT | Performed by: INTERNAL MEDICINE

## 2019-07-10 PROCEDURE — 25010000002 FOSAPREPITANT PER 1 MG: Performed by: NURSE PRACTITIONER

## 2019-07-10 PROCEDURE — 96417 CHEMO IV INFUS EACH ADDL SEQ: CPT | Performed by: INTERNAL MEDICINE

## 2019-07-10 PROCEDURE — 96413 CHEMO IV INFUSION 1 HR: CPT | Performed by: INTERNAL MEDICINE

## 2019-07-10 PROCEDURE — 25010000002 DOCETAXEL 10 MG/ML SOLUTION 16 ML VIAL: Performed by: NURSE PRACTITIONER

## 2019-07-10 RX ORDER — SODIUM CHLORIDE 9 MG/ML
250 INJECTION, SOLUTION INTRAVENOUS ONCE
Status: CANCELLED | OUTPATIENT
Start: 2019-07-10

## 2019-07-10 RX ORDER — DIPHENHYDRAMINE HCL 25 MG
50 CAPSULE ORAL DAILY
Qty: 1 CAPSULE | Refills: 0
Start: 2019-07-10 | End: 2019-08-06

## 2019-07-10 RX ORDER — PALONOSETRON HYDROCHLORIDE 0.05 MG/ML
0.25 INJECTION, SOLUTION INTRAVENOUS ONCE
Status: COMPLETED | OUTPATIENT
Start: 2019-07-10 | End: 2019-07-10

## 2019-07-10 RX ORDER — SODIUM CHLORIDE 0.9 % (FLUSH) 0.9 %
10 SYRINGE (ML) INJECTION AS NEEDED
Status: DISCONTINUED | OUTPATIENT
Start: 2019-07-10 | End: 2019-07-11 | Stop reason: HOSPADM

## 2019-07-10 RX ORDER — PALONOSETRON HYDROCHLORIDE 0.05 MG/ML
0.25 INJECTION, SOLUTION INTRAVENOUS ONCE
Status: DISCONTINUED | OUTPATIENT
Start: 2019-07-10 | End: 2019-07-11 | Stop reason: HOSPADM

## 2019-07-10 RX ORDER — DEXAMETHASONE 4 MG/1
TABLET ORAL
Qty: 1 TABLET | Refills: 0
Start: 2019-07-10 | End: 2019-08-06

## 2019-07-10 RX ORDER — DIPHENHYDRAMINE HYDROCHLORIDE 50 MG/ML
50 INJECTION INTRAMUSCULAR; INTRAVENOUS AS NEEDED
Status: CANCELLED | OUTPATIENT
Start: 2019-07-10

## 2019-07-10 RX ORDER — ACETAMINOPHEN 325 MG/1
650 TABLET ORAL DAILY
Qty: 1 TABLET | Refills: 0
Start: 2019-07-10 | End: 2019-08-06

## 2019-07-10 RX ORDER — FAMOTIDINE 10 MG/ML
20 INJECTION, SOLUTION INTRAVENOUS AS NEEDED
Status: CANCELLED | OUTPATIENT
Start: 2019-07-10

## 2019-07-10 RX ORDER — DEXAMETHASONE 4 MG/1
4 TABLET ORAL AS NEEDED
COMMUNITY
End: 2019-08-14

## 2019-07-10 RX ORDER — PALONOSETRON 0.05 MG/ML
0.25 INJECTION, SOLUTION INTRAVENOUS ONCE
Status: CANCELLED | OUTPATIENT
Start: 2019-07-10

## 2019-07-10 RX ORDER — SODIUM CHLORIDE 0.9 % (FLUSH) 0.9 %
10 SYRINGE (ML) INJECTION AS NEEDED
Status: CANCELLED | OUTPATIENT
Start: 2019-07-10

## 2019-07-10 RX ORDER — DIPHENHYDRAMINE HCL 25 MG
25 TABLET ORAL AS NEEDED
COMMUNITY
End: 2019-08-19

## 2019-07-10 RX ADMIN — TRASTUZUMAB 100 MG: 150 INJECTION, POWDER, LYOPHILIZED, FOR SOLUTION INTRAVENOUS at 13:23

## 2019-07-10 RX ADMIN — DOCETAXEL 95 MG: 10 INJECTION, SOLUTION INTRAVENOUS at 13:57

## 2019-07-10 RX ADMIN — Medication 10 ML: at 15:58

## 2019-07-10 RX ADMIN — PALONOSETRON 0.25 MG: 0.25 INJECTION, SOLUTION INTRAVENOUS at 12:42

## 2019-07-10 RX ADMIN — PEGFILGRASTIM 6 MG: KIT SUBCUTANEOUS at 15:58

## 2019-07-10 RX ADMIN — SODIUM CHLORIDE 100 ML: 900 INJECTION, SOLUTION INTRAVENOUS at 12:42

## 2019-07-10 RX ADMIN — CARBOPLATIN 470 MG: 10 INJECTION, SOLUTION INTRAVENOUS at 15:18

## 2019-07-10 RX ADMIN — Medication 500 UNITS: at 15:58

## 2019-07-11 PROCEDURE — 93306 TTE W/DOPPLER COMPLETE: CPT | Performed by: INTERNAL MEDICINE

## 2019-07-11 PROCEDURE — 0399T ADULT TRANSTHORACIC ECHO COMPLETE W/ CONT IF NECESSARY PER PROTOCOL: CPT | Performed by: INTERNAL MEDICINE

## 2019-07-17 ENCOUNTER — HOSPITAL ENCOUNTER (OUTPATIENT)
Dept: ONCOLOGY | Facility: HOSPITAL | Age: 59
Setting detail: INFUSION SERIES
Discharge: HOME | End: 2019-07-17

## 2019-07-17 VITALS
HEIGHT: 65 IN | BODY MASS INDEX: 18.99 KG/M2 | SYSTOLIC BLOOD PRESSURE: 100 MMHG | WEIGHT: 114 LBS | RESPIRATION RATE: 16 BRPM | TEMPERATURE: 98 F | HEART RATE: 88 BPM | DIASTOLIC BLOOD PRESSURE: 66 MMHG

## 2019-07-17 DIAGNOSIS — C50.912 INFILTRATING DUCTAL CARCINOMA OF LEFT BREAST (HCC): Primary | ICD-10-CM

## 2019-07-17 DIAGNOSIS — C50.912 INFILTRATING DUCTAL CARCINOMA OF LEFT BREAST (HCC): ICD-10-CM

## 2019-07-17 LAB
ALBUMIN SERPL-MCNC: 3.5 G/DL (ref 3.5–4.8)
ALBUMIN/GLOB SERPL: 1.5 G/DL (ref 1–1.7)
ALP SERPL-CCNC: 66 U/L (ref 32–91)
ALT SERPL W P-5'-P-CCNC: 18 U/L (ref 14–54)
ANION GAP SERPL CALCULATED.3IONS-SCNC: 13.9 MMOL/L (ref 5–15)
AST SERPL-CCNC: 18 U/L (ref 15–41)
BASOPHILS # BLD AUTO: 0.01 10*3/MM3 (ref 0–0.2)
BASOPHILS # BLD AUTO: 0.02 10*3/MM3 (ref 0–0.2)
BASOPHILS NFR BLD AUTO: 0.1 % (ref 0–1.5)
BASOPHILS NFR BLD AUTO: 0.3 % (ref 0–1.5)
BILIRUB SERPL-MCNC: 0.6 MG/DL (ref 0.3–1.2)
BUN BLD-MCNC: 8 MG/DL (ref 8–20)
BUN/CREAT SERPL: 10 (ref 5.4–26.2)
CALCIUM SPEC-SCNC: 8.8 MG/DL (ref 8.9–10.3)
CHLORIDE SERPL-SCNC: 101 MMOL/L (ref 101–111)
CO2 SERPL-SCNC: 25 MMOL/L (ref 22–32)
CREAT BLD-MCNC: 0.8 MG/DL (ref 0.4–1)
DEPRECATED RDW RBC AUTO: 56.3 FL (ref 37–54)
DEPRECATED RDW RBC AUTO: 57 FL (ref 37–54)
EOSINOPHIL # BLD AUTO: 0.04 10*3/MM3 (ref 0–0.4)
EOSINOPHIL # BLD AUTO: 0.07 10*3/MM3 (ref 0–0.4)
EOSINOPHIL NFR BLD AUTO: 0.5 % (ref 0.3–6.2)
EOSINOPHIL NFR BLD AUTO: 1 % (ref 0.3–6.2)
ERYTHROCYTE [DISTWIDTH] IN BLOOD BY AUTOMATED COUNT: 14.9 % (ref 12.3–15.4)
ERYTHROCYTE [DISTWIDTH] IN BLOOD BY AUTOMATED COUNT: 15 % (ref 12.3–15.4)
GFR SERPL CREATININE-BSD FRML MDRD: 74 ML/MIN/1.73
GLOBULIN UR ELPH-MCNC: 2.3 GM/DL (ref 2.5–3.8)
GLUCOSE BLD-MCNC: 92 MG/DL (ref 65–99)
HCT VFR BLD AUTO: 32.9 % (ref 34–46.6)
HCT VFR BLD AUTO: 33.3 % (ref 34–46.6)
HGB BLD-MCNC: 10.4 G/DL (ref 12–15.9)
HGB BLD-MCNC: 10.5 G/DL (ref 12–15.9)
LYMPHOCYTES # BLD AUTO: 0.93 10*3/MM3 (ref 0.7–3.1)
LYMPHOCYTES # BLD AUTO: 1.21 10*3/MM3 (ref 0.7–3.1)
LYMPHOCYTES NFR BLD AUTO: 12.5 % (ref 19.6–45.3)
LYMPHOCYTES NFR BLD AUTO: 17.8 % (ref 19.6–45.3)
MCH RBC QN AUTO: 33.2 PG (ref 26.6–33)
MCH RBC QN AUTO: 33.4 PG (ref 26.6–33)
MCHC RBC AUTO-ENTMCNC: 31.5 G/DL (ref 31.5–35.7)
MCHC RBC AUTO-ENTMCNC: 31.6 G/DL (ref 31.5–35.7)
MCV RBC AUTO: 105.4 FL (ref 79–97)
MCV RBC AUTO: 105.8 FL (ref 79–97)
MONOCYTES # BLD AUTO: 1.4 10*3/MM3 (ref 0.1–0.9)
MONOCYTES # BLD AUTO: 1.66 10*3/MM3 (ref 0.1–0.9)
MONOCYTES NFR BLD AUTO: 20.6 % (ref 5–12)
MONOCYTES NFR BLD AUTO: 22.4 % (ref 5–12)
NEUTROPHILS # BLD AUTO: 4.09 10*3/MM3 (ref 1.7–7)
NEUTROPHILS # BLD AUTO: 4.77 10*3/MM3 (ref 1.7–7)
NEUTROPHILS NFR BLD AUTO: 60.5 % (ref 42.7–76)
NEUTROPHILS NFR BLD AUTO: 64.3 % (ref 42.7–76)
PLATELET # BLD AUTO: 46 10*3/MM3 (ref 140–450)
PLATELET # BLD AUTO: 46 10*3/MM3 (ref 140–450)
PMV BLD AUTO: 11.1 FL (ref 6–12)
PMV BLD AUTO: 12.5 FL (ref 6–12)
POTASSIUM BLD-SCNC: 3.9 MMOL/L (ref 3.6–5.1)
PROT SERPL-MCNC: 5.8 G/DL (ref 6.1–7.9)
RBC # BLD AUTO: 3.11 10*6/MM3 (ref 3.77–5.28)
RBC # BLD AUTO: 3.16 10*6/MM3 (ref 3.77–5.28)
SODIUM BLD-SCNC: 136 MMOL/L (ref 136–144)
WBC NRBC COR # BLD: 6.78 10*3/MM3 (ref 3.4–10.8)
WBC NRBC COR # BLD: 7.42 10*3/MM3 (ref 3.4–10.8)

## 2019-07-17 PROCEDURE — 25010000002 TRASTUZUMAB PER 10 MG: Performed by: INTERNAL MEDICINE

## 2019-07-17 PROCEDURE — 36415 COLL VENOUS BLD VENIPUNCTURE: CPT | Performed by: INTERNAL MEDICINE

## 2019-07-17 PROCEDURE — 85025 COMPLETE CBC W/AUTO DIFF WBC: CPT | Performed by: INTERNAL MEDICINE

## 2019-07-17 PROCEDURE — 80053 COMPREHEN METABOLIC PANEL: CPT | Performed by: INTERNAL MEDICINE

## 2019-07-17 PROCEDURE — 96413 CHEMO IV INFUSION 1 HR: CPT | Performed by: INTERNAL MEDICINE

## 2019-07-17 RX ORDER — SODIUM CHLORIDE 9 MG/ML
250 INJECTION, SOLUTION INTRAVENOUS ONCE
Status: CANCELLED | OUTPATIENT
Start: 2019-07-17

## 2019-07-17 RX ORDER — SODIUM CHLORIDE 0.9 % (FLUSH) 0.9 %
10 SYRINGE (ML) INJECTION AS NEEDED
Status: DISCONTINUED | OUTPATIENT
Start: 2019-07-17 | End: 2019-07-18 | Stop reason: HOSPADM

## 2019-07-17 RX ORDER — DIPHENHYDRAMINE HCL 25 MG
50 CAPSULE ORAL DAILY
Qty: 1 CAPSULE | Refills: 0
Start: 2019-07-17 | End: 2019-08-06

## 2019-07-17 RX ORDER — ACETAMINOPHEN 325 MG/1
650 TABLET ORAL DAILY
Qty: 1 TABLET | Refills: 0
Start: 2019-07-17 | End: 2019-08-06

## 2019-07-17 RX ORDER — SODIUM CHLORIDE 0.9 % (FLUSH) 0.9 %
10 SYRINGE (ML) INJECTION AS NEEDED
Status: CANCELLED | OUTPATIENT
Start: 2019-07-17

## 2019-07-17 RX ADMIN — TRASTUZUMAB 100 MG: 150 INJECTION, POWDER, LYOPHILIZED, FOR SOLUTION INTRAVENOUS at 11:37

## 2019-07-17 RX ADMIN — Medication 10 ML: at 12:15

## 2019-07-17 RX ADMIN — Medication 500 UNITS: at 12:15

## 2019-07-17 NOTE — PROGRESS NOTES
Pt took po Tylenol and Benadryl for treatment prior to arrival at 0830am. Pt to get Herceptin today Plts 46K ok to treat per v/o Dr Burrell

## 2019-07-24 ENCOUNTER — RESULTS ENCOUNTER (OUTPATIENT)
Dept: ONCOLOGY | Facility: CLINIC | Age: 59
End: 2019-07-24

## 2019-07-24 ENCOUNTER — APPOINTMENT (OUTPATIENT)
Dept: LAB | Facility: HOSPITAL | Age: 59
End: 2019-07-24

## 2019-07-24 ENCOUNTER — OFFICE VISIT (OUTPATIENT)
Dept: ONCOLOGY | Facility: CLINIC | Age: 59
End: 2019-07-24

## 2019-07-24 VITALS
HEART RATE: 116 BPM | RESPIRATION RATE: 18 BRPM | HEIGHT: 65 IN | SYSTOLIC BLOOD PRESSURE: 115 MMHG | WEIGHT: 113.2 LBS | DIASTOLIC BLOOD PRESSURE: 70 MMHG | BODY MASS INDEX: 18.86 KG/M2 | TEMPERATURE: 99.7 F

## 2019-07-24 DIAGNOSIS — F41.9 ANXIETY: ICD-10-CM

## 2019-07-24 DIAGNOSIS — C50.919 INFILTRATING DUCTAL CARCINOMA OF BREAST, UNSPECIFIED LATERALITY (HCC): ICD-10-CM

## 2019-07-24 DIAGNOSIS — C50.919 INFILTRATING DUCTAL CARCINOMA OF BREAST, UNSPECIFIED LATERALITY (HCC): Primary | ICD-10-CM

## 2019-07-24 LAB
BASOPHILS # BLD AUTO: 0.02 10*3/MM3 (ref 0–0.2)
BASOPHILS NFR BLD AUTO: 0.2 % (ref 0–1.5)
DEPRECATED RDW RBC AUTO: 56.5 FL (ref 37–54)
EOSINOPHIL # BLD AUTO: 0.01 10*3/MM3 (ref 0–0.4)
EOSINOPHIL NFR BLD AUTO: 0.1 % (ref 0.3–6.2)
ERYTHROCYTE [DISTWIDTH] IN BLOOD BY AUTOMATED COUNT: 15 % (ref 12.3–15.4)
HCT VFR BLD AUTO: 31.9 % (ref 34–46.6)
HGB BLD-MCNC: 10.3 G/DL (ref 12–15.9)
LYMPHOCYTES # BLD AUTO: 0.69 10*3/MM3 (ref 0.7–3.1)
LYMPHOCYTES NFR BLD AUTO: 5.5 % (ref 19.6–45.3)
MCH RBC QN AUTO: 34 PG (ref 26.6–33)
MCHC RBC AUTO-ENTMCNC: 32.3 G/DL (ref 31.5–35.7)
MCV RBC AUTO: 105.3 FL (ref 79–97)
MONOCYTES # BLD AUTO: 0.95 10*3/MM3 (ref 0.1–0.9)
MONOCYTES NFR BLD AUTO: 7.6 % (ref 5–12)
NEUTROPHILS # BLD AUTO: 10.81 10*3/MM3 (ref 1.7–7)
NEUTROPHILS NFR BLD AUTO: 86.6 % (ref 42.7–76)
PLATELET # BLD AUTO: 64 10*3/MM3 (ref 140–450)
PMV BLD AUTO: 10.8 FL (ref 6–12)
RBC # BLD AUTO: 3.03 10*6/MM3 (ref 3.77–5.28)
WBC NRBC COR # BLD: 12.48 10*3/MM3 (ref 3.4–10.8)

## 2019-07-24 PROCEDURE — 85025 COMPLETE CBC W/AUTO DIFF WBC: CPT | Performed by: INTERNAL MEDICINE

## 2019-07-24 PROCEDURE — 36415 COLL VENOUS BLD VENIPUNCTURE: CPT | Performed by: INTERNAL MEDICINE

## 2019-07-24 PROCEDURE — 99215 OFFICE O/P EST HI 40 MIN: CPT | Performed by: INTERNAL MEDICINE

## 2019-07-24 RX ORDER — ALPRAZOLAM 0.5 MG/1
0.5 TABLET ORAL DAILY
Qty: 30 TABLET | Refills: 0 | Status: SHIPPED | OUTPATIENT
Start: 2019-07-24 | End: 2019-08-30

## 2019-07-24 NOTE — PROGRESS NOTES
Hematology/Oncology Outpatient Follow Up    Lisa Magallanes  1960    Primary Care Physician: Tin Eli MD  Referring Physician: Tin Eli MD    Chief complaint:  Left breast pT1b, pN0, Mx infiltrating ductal carcinoma, ER negative, [place] negative and HER-2/martha positive.       History of Present Illness:   Ms. Magallanes reports her sister was diagnosed with breast cancer in   2017.  Patient never had any mammograms done on herself and had her initial one done in November 2018.   · 11/6/18 - Bilateral screening mammogram showed left breast global asymmetry within the upper   half of the left breast and right breast focal asymmetry within the lower inner quadrant.  This   followed up with initial biopsy of the right breast mass.    · 12/12/18 - CT-guided biopsy of the right breast mass at 4 o’clock, which is 4 to 5 cm from the   nipple, were fragments of hyalinized fibroadenoma, negative for carcinoma in situ and invasive   carcinoma.    · 12/12/18 - On the left breast stereotactic biopsy of the calcifications, benign fibrofatty   breast tissue.  No malignancy.  Another biopsy, specimen 2, of calcifications was positive for   DCIS, high nuclear grade, solid type with central necrosis.  No invasive malignancy was   identified.  Tumor was ER negative, CA negative.  · 1/29/19 - MRI of the breast bilaterally showed 7 x 6 mm medial to the biopsy clip of the left   breast at the biopsy proven DCIS.  Could be residual malignancy near the post biopsy change at   the site of recently diagnosed DCIS in the left breast.  An 11 mm mass concerning for invasive   malignancy and three areas of focal non-mass enhancement in the upper outer and lower outer   quadrants of the left breast.  No MR signs of malignancy in the right breast.    · 2/5/19 - MRI biopsy of the left breast mass at 6 o'clock position, invasive poorly   differentiated mammary carcinoma with Sylva grade of 8/9.  No in situ carcinoma was    identified.  Tumor ER negative, VA negative and HER-2/martha testing was positive.   · Patient was sent to the [facility] and seen initially on 19.   · 19 - Left breast mastectomy, invasive poorly differentiated ductal carcinoma, 8 mm, pT1b   with associated high grade DCIS.  Margins negative.  One benign sentinel lymph node.       8/9 Boaz score.  DCIS present 7 mm.  Tumor ER negative, VA negative, HER-2/martha positive.    · 3/18/19 - Adjuvant chemotherapy with Taxotere, Carboplatin and Herceptin was initiated.    · 3/20/19 - PET/CT scan with no evidence of metastatic disease within the neck, chest, abdomen or   pelvis.  There is cholelithiasis.    7/10/2019 patient completed 6 cycles of adjuvant TCH        Past Medical History:   Diagnosis Date   • Breast cancer (CMS/HCC)    • Cytopenia     Chemo-induced cytopenia   • Fatigue        Past Surgical History:   Procedure Laterality Date   •  SECTION     •  SECTION     • MASTECTOMY Left 2019   • POSTPARTUM TUBAL LIGATION     • TUBAL ABDOMINAL LIGATION           Current Outpatient Medications:   •  acetaminophen (TYLENOL) 325 MG tablet, Take 2 tablets by mouth Daily., Disp: 1 tablet, Rfl: 0  •  acetaminophen (TYLENOL) 325 MG tablet, Take 2 tablets by mouth Daily., Disp: 1 tablet, Rfl: 0  •  acetaminophen (TYLENOL) 325 MG tablet, Take 2 tablets by mouth Daily., Disp: 1 tablet, Rfl: 0  •  Acetaminophen 325 MG capsule, TYLENOL CAPS, Disp: , Rfl:   •  ALPRAZolam (XANAX) 0.5 MG tablet, Take 1 tablet by mouth Daily., Disp: 30 tablet, Rfl: 0  •  dexamethasone (DECADRON) 4 MG tablet, Take 4 mg by mouth As Needed (2 tablets day before, day of , and day after chemo treatment)., Disp: , Rfl:   •  dexamethasone (DECADRON) 4 MG tablet, Take 8mg by mouth twice daily on the day before, day of, and day after treatment, Disp: 1 tablet, Rfl: 0  •  dexamethasone (DECADRON) 4 MG tablet, Take 8mg by mouth twice daily on the day before, day  of, and day after treatment, Disp: 1 tablet, Rfl: 0  •  diphenhydrAMINE (BENADRYL) 25 mg capsule, Take 2 capsules by mouth Daily., Disp: 1 capsule, Rfl: 0  •  diphenhydrAMINE (BENADRYL) 25 mg capsule, Take 2 capsules by mouth Daily., Disp: 1 capsule, Rfl: 0  •  diphenhydrAMINE (BENADRYL) 25 mg capsule, Take 2 capsules by mouth Daily., Disp: 1 capsule, Rfl: 0  •  diphenhydrAMINE (BENADRYL) 25 MG tablet, Take 25 mg by mouth As Needed (Pt only takes this prior to chemotherapy)., Disp: , Rfl:   •  Loratadine-Pseudoephedrine (CLARITIN-D 24 HOUR PO), Take 1 tablet by mouth Daily., Disp: , Rfl:   •  ondansetron (ZOFRAN) 8 MG tablet, TK 1 T PO Q 8 H PRF NAUSEA OR VOM, Disp: , Rfl: 2  •  promethazine (PHENERGAN) 25 MG tablet, TK 1 T PO Q 4 TO 6 H PRF NAUSEA OR VOM, Disp: , Rfl: 2    Allergies   Allergen Reactions   • Morphine Rash       Family History   Problem Relation Age of Onset   • Breast cancer Sister 58       Cancer-related family history includes Breast cancer (age of onset: 58) in her sister.    Social History     Tobacco Use   • Smoking status: Light Tobacco Smoker     Packs/day: 0.00     Years: 10.00     Pack years: 0.00     Types: Cigarettes     Start date: 2009   • Smokeless tobacco: Never Used   • Tobacco comment: Attempting to quit   Substance Use Topics   • Alcohol use: No     Frequency: Never   • Drug use: No       I have reviewed the history of present illness, past medical history, family history, social history, lab results, all notes and other records since the patient was last seen on 7/8/2019    SUBJECTIVE:      Patient in my office for follow-up during chemotherapy.  Patient completed 6 cycles of treatment tolerated poorly maintain her weight continues to smoke continues to work every day.  No fevers night sweats weight loss reports occasional palpitations mainly at nighttime     ROS:       Review of Systems   Constitutional: Negative for fever.   HENT: Negative for nosebleeds and trouble swallowing.   "  Eyes: Negative for visual disturbance.   Respiratory: Negative for cough, shortness of breath and wheezing.    Cardiovascular: Negative for chest pain.   Gastrointestinal: Negative for abdominal pain and blood in stool.   Endocrine: Negative for cold intolerance.   Genitourinary: Negative for dysuria and hematuria.   Musculoskeletal: Negative for joint swelling.   Skin: Negative for rash.   Allergic/Immunologic: Negative for environmental allergies.   Neurological: Negative for seizures.   Hematological: Does not bruise/bleed easily.   Psychiatric/Behavioral: The patient is not nervous/anxious.          Objective:       Vitals:    07/24/19 1116   BP: 115/70   Pulse: 116   Resp: 18   Temp: 99.7 °F (37.6 °C)  Comment: Pt was just drinking coffee and had a cough drop   Weight: 51.3 kg (113 lb 3.2 oz)   Height: 165.1 cm (65\")   PainSc: 0-No pain         PHYSICAL EXAM:       Physical Exam   Constitutional: She is oriented to person, place, and time. No distress.   HENT:   Head: Normocephalic and atraumatic.   Eyes: Conjunctivae and EOM are normal. Right eye exhibits no discharge. Left eye exhibits no discharge. No scleral icterus.   Neck: Normal range of motion. Neck supple. No thyromegaly present.   Cardiovascular: Normal rate, regular rhythm and normal heart sounds. Exam reveals no gallop and no friction rub.   Pulmonary/Chest: Effort normal. No stridor. No respiratory distress. She has no wheezes.   Abdominal: Soft. Bowel sounds are normal. She exhibits no mass. There is no tenderness. There is no rebound and no guarding.   Musculoskeletal: Normal range of motion. She exhibits no tenderness.   Lymphadenopathy:     She has no cervical adenopathy.   Neurological: She is alert and oriented to person, place, and time. She exhibits normal muscle tone.   Skin: Skin is warm. No rash noted. She is not diaphoretic. No erythema.   Psychiatric: She has a normal mood and affect. Her behavior is normal.   Nursing note and vitals " reviewed.       RECENT LABS:     WBC   Date Value Ref Range Status   07/24/2019 12.48 (H) 3.40 - 10.80 10*3/mm3 Final     RBC   Date Value Ref Range Status   07/24/2019 3.03 (L) 3.77 - 5.28 10*6/mm3 Final   10/29/2018 4.82 3.80 - 5.10 Million/uL Final     Hemoglobin   Date Value Ref Range Status   07/24/2019 10.3 (L) 12.0 - 15.9 g/dL Final     Hematocrit   Date Value Ref Range Status   07/24/2019 31.9 (L) 34.0 - 46.6 % Final     MCV   Date Value Ref Range Status   07/24/2019 105.3 (H) 79.0 - 97.0 fL Final     MCH   Date Value Ref Range Status   07/24/2019 34.0 (H) 26.6 - 33.0 pg Final     MCHC   Date Value Ref Range Status   07/24/2019 32.3 31.5 - 35.7 g/dL Final     RDW   Date Value Ref Range Status   07/24/2019 15.0 12.3 - 15.4 % Final     RDW-SD   Date Value Ref Range Status   07/24/2019 56.5 (H) 37.0 - 54.0 fl Final     MPV   Date Value Ref Range Status   07/24/2019 10.8 6.0 - 12.0 fL Final     Platelets   Date Value Ref Range Status   07/24/2019 64 (L) 140 - 450 10*3/mm3 Final     Neutrophil %   Date Value Ref Range Status   07/24/2019 86.6 (H) 42.7 - 76.0 % Final     Lymphocyte %   Date Value Ref Range Status   07/24/2019 5.5 (L) 19.6 - 45.3 % Final     Monocyte %   Date Value Ref Range Status   07/24/2019 7.6 5.0 - 12.0 % Final     Eosinophil %   Date Value Ref Range Status   07/24/2019 0.1 (L) 0.3 - 6.2 % Final     Basophil %   Date Value Ref Range Status   07/24/2019 0.2 0.0 - 1.5 % Final     Neutrophils, Absolute   Date Value Ref Range Status   07/24/2019 10.81 (H) 1.70 - 7.00 10*3/mm3 Final     Lymphocytes, Absolute   Date Value Ref Range Status   07/24/2019 0.69 (L) 0.70 - 3.10 10*3/mm3 Final     Monocytes, Absolute   Date Value Ref Range Status   07/24/2019 0.95 (H) 0.10 - 0.90 10*3/mm3 Final     Eosinophils, Absolute   Date Value Ref Range Status   07/24/2019 0.01 0.00 - 0.40 10*3/mm3 Final     Basophils, Absolute   Date Value Ref Range Status   07/24/2019 0.02 0.00 - 0.20 10*3/mm3 Final     nRBC    Date Value Ref Range Status   03/05/2019 0 0 /100[WBCs] Final       Lab Results   Component Value Date    GLUCOSE 92 07/17/2019    BUN 8 07/17/2019    CREATININE 0.80 07/17/2019    EGFRIFNONA 74 07/17/2019    EGFRIFAFRI >60 06/10/2019    BCR 10.0 07/17/2019    K 3.9 07/17/2019    CO2 25.0 07/17/2019    CALCIUM 8.8 (L) 07/17/2019    ALBUMIN 3.50 07/17/2019    LABIL2 1.3 06/10/2019    AST 18 07/17/2019    ALT 18 07/17/2019         Assessment/Plan      ASSESSMENT:     1. Left breast pT1b, pN0, Mx infiltrating ductal carcinoma, ER negative, [place] negative and HER-2/martha positive.   2. Chemo induced anemia  3. Anxiety  4. ECOG 1      PLAN:      1. Reviewed the laboratory work-up with the patient patient has chemo induced anemia no growth factors at this time.  2. Patient completed adjuvant Taxotere carboplatin and Herceptin treatments for 6 cycles.  Patient will be treated with IV 3 weeks single single agent Herceptin completed total of 1 year treatments.  Check CMP CA-27-29 today.  We will obtain 2D echo now and every 3 months.  3. Prescription of Xanax 0.5 mg to take at bedtime was given for anxiety 30 pills with no refills  4. I will see her back in my office in 4 weeks recheck CBC CMP and CA-27-29 at that time    I have reviewed labs results, imaging, vitals, and medications with the patient today.     I counselled Lisa of the risks of continuing to use tobacco and cessation.    During this visit, I spent 3-10 minutes counseling the patient regarding tobacco cessation.     Patient verbalized understanding and is in agreement of the above plan.          This report was compiled using Dragon voice recognition software. I have made every effort to proof read this document; however, typographical errors may persist.

## 2019-08-07 ENCOUNTER — HOSPITAL ENCOUNTER (OUTPATIENT)
Dept: ONCOLOGY | Facility: HOSPITAL | Age: 59
Setting detail: INFUSION SERIES
Discharge: HOME | End: 2019-08-07

## 2019-08-07 VITALS
TEMPERATURE: 97.8 F | DIASTOLIC BLOOD PRESSURE: 75 MMHG | WEIGHT: 112 LBS | HEIGHT: 65 IN | BODY MASS INDEX: 18.66 KG/M2 | HEART RATE: 102 BPM | RESPIRATION RATE: 18 BRPM | SYSTOLIC BLOOD PRESSURE: 125 MMHG

## 2019-08-07 DIAGNOSIS — C50.919 INFILTRATING DUCTAL CARCINOMA OF BREAST, UNSPECIFIED LATERALITY (HCC): Primary | ICD-10-CM

## 2019-08-07 DIAGNOSIS — IMO0001 OTHER COMPLICATION DUE TO VENOUS ACCESS DEVICE, INITIAL ENCOUNTER: Primary | ICD-10-CM

## 2019-08-07 DIAGNOSIS — C50.912 INFILTRATING DUCTAL CARCINOMA OF LEFT BREAST (HCC): ICD-10-CM

## 2019-08-07 LAB
BASOPHILS # BLD AUTO: 0.01 10*3/MM3 (ref 0–0.2)
BASOPHILS NFR BLD AUTO: 0.2 % (ref 0–1.5)
DEPRECATED RDW RBC AUTO: 57.8 FL (ref 37–54)
EOSINOPHIL # BLD AUTO: 0.11 10*3/MM3 (ref 0–0.4)
EOSINOPHIL NFR BLD AUTO: 2.1 % (ref 0.3–6.2)
ERYTHROCYTE [DISTWIDTH] IN BLOOD BY AUTOMATED COUNT: 15.3 % (ref 12.3–15.4)
HCT VFR BLD AUTO: 36.3 % (ref 34–46.6)
HGB BLD-MCNC: 11.8 G/DL (ref 12–15.9)
LYMPHOCYTES # BLD AUTO: 1.43 10*3/MM3 (ref 0.7–3.1)
LYMPHOCYTES NFR BLD AUTO: 27.7 % (ref 19.6–45.3)
MCH RBC QN AUTO: 33.3 PG (ref 26.6–33)
MCHC RBC AUTO-ENTMCNC: 32.5 G/DL (ref 31.5–35.7)
MCV RBC AUTO: 102.5 FL (ref 79–97)
MONOCYTES # BLD AUTO: 0.58 10*3/MM3 (ref 0.1–0.9)
MONOCYTES NFR BLD AUTO: 11.2 % (ref 5–12)
NEUTROPHILS # BLD AUTO: 3.03 10*3/MM3 (ref 1.7–7)
NEUTROPHILS NFR BLD AUTO: 58.8 % (ref 42.7–76)
PLATELET # BLD AUTO: 248 10*3/MM3 (ref 140–450)
PMV BLD AUTO: 9.4 FL (ref 6–12)
RBC # BLD AUTO: 3.54 10*6/MM3 (ref 3.77–5.28)
WBC NRBC COR # BLD: 5.16 10*3/MM3 (ref 3.4–10.8)

## 2019-08-07 PROCEDURE — 96413 CHEMO IV INFUSION 1 HR: CPT | Performed by: INTERNAL MEDICINE

## 2019-08-07 PROCEDURE — 85025 COMPLETE CBC W/AUTO DIFF WBC: CPT | Performed by: INTERNAL MEDICINE

## 2019-08-07 PROCEDURE — 36415 COLL VENOUS BLD VENIPUNCTURE: CPT | Performed by: INTERNAL MEDICINE

## 2019-08-07 PROCEDURE — 25010000002 TRASTUZUMAB PER 10 MG: Performed by: INTERNAL MEDICINE

## 2019-08-07 RX ORDER — MIRTAZAPINE 7.5 MG/1
TABLET, FILM COATED ORAL
COMMUNITY
Start: 2018-10-31 | End: 2019-08-14

## 2019-08-07 RX ORDER — SODIUM CHLORIDE 0.9 % (FLUSH) 0.9 %
10 SYRINGE (ML) INJECTION AS NEEDED
Status: DISCONTINUED | OUTPATIENT
Start: 2019-08-07 | End: 2019-08-08 | Stop reason: HOSPADM

## 2019-08-07 RX ORDER — CETIRIZINE HYDROCHLORIDE 10 MG/1
10 TABLET ORAL DAILY
COMMUNITY
End: 2019-08-14

## 2019-08-07 RX ORDER — SODIUM CHLORIDE 0.9 % (FLUSH) 0.9 %
10 SYRINGE (ML) INJECTION AS NEEDED
Status: CANCELLED | OUTPATIENT
Start: 2019-08-07

## 2019-08-07 RX ORDER — RANITIDINE 150 MG/1
150 TABLET ORAL NIGHTLY
COMMUNITY
End: 2020-02-12

## 2019-08-07 RX ORDER — ASPIRIN 81 MG/1
TABLET, CHEWABLE ORAL DAILY
COMMUNITY
End: 2019-08-19

## 2019-08-07 RX ORDER — OMEPRAZOLE 40 MG/1
CAPSULE, DELAYED RELEASE ORAL
Refills: 2 | COMMUNITY
Start: 2019-05-17 | End: 2019-08-19

## 2019-08-07 RX ORDER — CRANBERRY FRUIT EXTRACT 200 MG
4 CAPSULE ORAL DAILY
COMMUNITY
Start: 2019-01-21 | End: 2019-08-14

## 2019-08-07 RX ADMIN — TRASTUZUMAB 300 MG: 150 INJECTION, POWDER, LYOPHILIZED, FOR SOLUTION INTRAVENOUS at 12:34

## 2019-08-07 RX ADMIN — HEPARIN SODIUM (PORCINE) LOCK FLUSH IV SOLN 100 UNIT/ML 500 UNITS: 100 SOLUTION at 13:09

## 2019-08-07 RX ADMIN — Medication 10 ML: at 13:09

## 2019-08-07 NOTE — PROGRESS NOTES
Pt here for herceptin and unable to return blood from infusaport,   Pt states after last treatment that she had soreness and bruising to port for 4-5 days after treatment,  Pt denies pain at present.   Reviewed with Meaghan Medina NP and ok to give herceptin through peripheral IV.  Reviewed with  Pt and v/u agreeement.   When deaccessing infusaport,   Small serous drainage noted, pt denied pain,  Pt states after last infusion,  She had to change bra due to leaking from port and reports serosanguionous drainage.   Reviewed with Meaghan Medina NP and pt to have CXR completed for port placement confirmation.  Called pt and informed to have CXR completed, pt to have completed prior to MD appt with Dr Indra Pratt.

## 2019-08-14 ENCOUNTER — APPOINTMENT (OUTPATIENT)
Dept: LAB | Facility: HOSPITAL | Age: 59
End: 2019-08-14

## 2019-08-14 ENCOUNTER — OFFICE VISIT (OUTPATIENT)
Dept: ONCOLOGY | Facility: CLINIC | Age: 59
End: 2019-08-14

## 2019-08-14 ENCOUNTER — RESULTS ENCOUNTER (OUTPATIENT)
Dept: ONCOLOGY | Facility: CLINIC | Age: 59
End: 2019-08-14

## 2019-08-14 ENCOUNTER — HOSPITAL ENCOUNTER (OUTPATIENT)
Dept: GENERAL RADIOLOGY | Facility: HOSPITAL | Age: 59
Discharge: HOME | End: 2019-08-14
Admitting: NURSE PRACTITIONER

## 2019-08-14 VITALS
TEMPERATURE: 98.1 F | WEIGHT: 112 LBS | HEART RATE: 81 BPM | SYSTOLIC BLOOD PRESSURE: 142 MMHG | BODY MASS INDEX: 18.66 KG/M2 | RESPIRATION RATE: 20 BRPM | DIASTOLIC BLOOD PRESSURE: 61 MMHG | HEIGHT: 65 IN

## 2019-08-14 DIAGNOSIS — C50.912 INFILTRATING DUCTAL CARCINOMA OF LEFT BREAST (HCC): Primary | ICD-10-CM

## 2019-08-14 DIAGNOSIS — IMO0001 OTHER COMPLICATION DUE TO VENOUS ACCESS DEVICE, INITIAL ENCOUNTER: ICD-10-CM

## 2019-08-14 DIAGNOSIS — C50.912 INFILTRATING DUCTAL CARCINOMA OF LEFT BREAST (HCC): ICD-10-CM

## 2019-08-14 LAB
ALBUMIN SERPL-MCNC: 3.6 G/DL (ref 3.5–4.8)
ALBUMIN/GLOB SERPL: 1.1 G/DL (ref 1–1.7)
ALP SERPL-CCNC: 62 U/L (ref 32–91)
ALT SERPL W P-5'-P-CCNC: 18 U/L (ref 14–54)
ANION GAP SERPL CALCULATED.3IONS-SCNC: 14.4 MMOL/L (ref 5–15)
AST SERPL-CCNC: 24 U/L (ref 15–41)
BASOPHILS # BLD AUTO: 0.04 10*3/MM3 (ref 0–0.2)
BASOPHILS NFR BLD AUTO: 0.8 % (ref 0–1.5)
BILIRUB SERPL-MCNC: 0.3 MG/DL (ref 0.3–1.2)
BUN BLD-MCNC: 8 MG/DL (ref 8–20)
BUN/CREAT SERPL: 8.9 (ref 5.4–26.2)
CALCIUM SPEC-SCNC: 9.5 MG/DL (ref 8.9–10.3)
CHLORIDE SERPL-SCNC: 105 MMOL/L (ref 101–111)
CO2 SERPL-SCNC: 25 MMOL/L (ref 22–32)
CREAT BLD-MCNC: 0.9 MG/DL (ref 0.4–1)
DEPRECATED RDW RBC AUTO: 53.9 FL (ref 37–54)
EOSINOPHIL # BLD AUTO: 0.14 10*3/MM3 (ref 0–0.4)
EOSINOPHIL NFR BLD AUTO: 2.8 % (ref 0.3–6.2)
ERYTHROCYTE [DISTWIDTH] IN BLOOD BY AUTOMATED COUNT: 14.7 % (ref 12.3–15.4)
GFR SERPL CREATININE-BSD FRML MDRD: 65 ML/MIN/1.73
GLOBULIN UR ELPH-MCNC: 3.2 GM/DL (ref 2.5–3.8)
GLUCOSE BLD-MCNC: 95 MG/DL (ref 65–99)
HCT VFR BLD AUTO: 37.7 % (ref 34–46.6)
HGB BLD-MCNC: 11.9 G/DL (ref 12–15.9)
LYMPHOCYTES # BLD AUTO: 1.26 10*3/MM3 (ref 0.7–3.1)
LYMPHOCYTES NFR BLD AUTO: 25.5 % (ref 19.6–45.3)
MCH RBC QN AUTO: 32.6 PG (ref 26.6–33)
MCHC RBC AUTO-ENTMCNC: 31.6 G/DL (ref 31.5–35.7)
MCV RBC AUTO: 103.3 FL (ref 79–97)
MONOCYTES # BLD AUTO: 0.53 10*3/MM3 (ref 0.1–0.9)
MONOCYTES NFR BLD AUTO: 10.7 % (ref 5–12)
NEUTROPHILS # BLD AUTO: 2.97 10*3/MM3 (ref 1.7–7)
NEUTROPHILS NFR BLD AUTO: 60.2 % (ref 42.7–76)
PLATELET # BLD AUTO: 200 10*3/MM3 (ref 140–450)
PMV BLD AUTO: 9.2 FL (ref 6–12)
POTASSIUM BLD-SCNC: 4.4 MMOL/L (ref 3.6–5.1)
PROT SERPL-MCNC: 6.8 G/DL (ref 6.1–7.9)
RBC # BLD AUTO: 3.65 10*6/MM3 (ref 3.77–5.28)
SODIUM BLD-SCNC: 140 MMOL/L (ref 136–144)
WBC NRBC COR # BLD: 4.94 10*3/MM3 (ref 3.4–10.8)

## 2019-08-14 PROCEDURE — 36415 COLL VENOUS BLD VENIPUNCTURE: CPT | Performed by: INTERNAL MEDICINE

## 2019-08-14 PROCEDURE — 85025 COMPLETE CBC W/AUTO DIFF WBC: CPT | Performed by: INTERNAL MEDICINE

## 2019-08-14 PROCEDURE — 80053 COMPREHEN METABOLIC PANEL: CPT | Performed by: NURSE PRACTITIONER

## 2019-08-14 PROCEDURE — 99215 OFFICE O/P EST HI 40 MIN: CPT | Performed by: INTERNAL MEDICINE

## 2019-08-14 PROCEDURE — 71046 X-RAY EXAM CHEST 2 VIEWS: CPT

## 2019-08-14 RX ORDER — ALPRAZOLAM 0.25 MG/1
0.25 TABLET ORAL DAILY
Qty: 30 TABLET | Refills: 3 | Status: SHIPPED | OUTPATIENT
Start: 2019-08-14 | End: 2020-09-14

## 2019-08-14 NOTE — PROGRESS NOTES
Hematology/Oncology Outpatient Follow Up    Lisa Magallanes  1960    Primary Care Physician: Tin Eli MD  Referring Physician: Tin Eli MD    Chief complaint:  Left breast pT1b, pN0, Mx infiltrating ductal carcinoma, ER negative, VA negative and HER-2/martha positive.       History of Present Illness:   Ms. Magallanes reports her sister was diagnosed with breast cancer in   2017.  Patient never had any mammograms done on herself and had her initial one done in November 2018.   · 11/6/18 - Bilateral screening mammogram showed left breast global asymmetry within the upper   half of the left breast and right breast focal asymmetry within the lower inner quadrant.  This   followed up with initial biopsy of the right breast mass.    · 12/12/18 - CT-guided biopsy of the right breast mass at 4 o’clock, which is 4 to 5 cm from the   nipple, were fragments of hyalinized fibroadenoma, negative for carcinoma in situ and invasive   carcinoma.    · 12/12/18 - On the left breast stereotactic biopsy of the calcifications, benign fibrofatty   breast tissue.  No malignancy.  Another biopsy, specimen 2, of calcifications was positive for   DCIS, high nuclear grade, solid type with central necrosis.  No invasive malignancy was   identified.  Tumor was ER negative, VA negative.  · 1/29/19 - MRI of the breast bilaterally showed 7 x 6 mm medial to the biopsy clip of the left   breast at the biopsy proven DCIS.  Could be residual malignancy near the post biopsy change at   the site of recently diagnosed DCIS in the left breast.  An 11 mm mass concerning for invasive   malignancy and three areas of focal non-mass enhancement in the upper outer and lower outer   quadrants of the left breast.  No MR signs of malignancy in the right breast.    · 2/5/19 - MRI biopsy of the left breast mass at 6 o'clock position, invasive poorly   differentiated mammary carcinoma with [place] grade of 8/9.  No in situ carcinoma was    identified.  Tumor ER negative, [place] negative and HER-2/martha testing was positive.   · Patient was sent to the [facility] and seen initially on 19.   · 19 - Left breast mastectomy, invasive poorly differentiated ductal carcinoma, 8 mm, pT1b   with associated high grade DCIS.  Margins negative.  One benign sentinel lymph node.       8/9 [place] score.  DCIS present 7 mm.  Tumor ER negative, NJ negative, HER-2/martha positive.    · 3/18/19 - Adjuvant chemotherapy with Taxotere, Carboplatin and Herceptin was initiated.    · 3/20/19 - PET/CT scan with no evidence of metastatic disease within the neck, chest, abdomen or   pelvis.  There is cholelithiasis.    7/10/2019 patient completed 6 cycles of adjuvant TCH    2019 patient initiated on single agent Herceptin     Past Medical History:   Diagnosis Date   • Breast cancer (CMS/HCC)    • Cytopenia     Chemo-induced cytopenia   • Fatigue        Past Surgical History:   Procedure Laterality Date   •  SECTION     •  SECTION     • MASTECTOMY Left 2019   • POSTPARTUM TUBAL LIGATION     • TUBAL ABDOMINAL LIGATION           Current Outpatient Medications:   •  Acetaminophen 325 MG capsule, TYLENOL CAPS, Disp: , Rfl:   •  ALPRAZolam (XANAX) 0.5 MG tablet, Take 1 tablet by mouth Daily., Disp: 30 tablet, Rfl: 0  •  cetirizine (zyrTEC) 10 MG tablet, Take 10 mg by mouth Daily., Disp: , Rfl:   •  diphenhydrAMINE (BENADRYL) 25 MG tablet, Take 25 mg by mouth As Needed (Pt only takes this prior to chemotherapy)., Disp: , Rfl:   •  aspirin 81 MG chewable tablet, Chew Daily., Disp: , Rfl:   •  dexamethasone (DECADRON) 4 MG tablet, Take 4 mg by mouth As Needed (2 tablets day before, day of , and day after chemo treatment)., Disp: , Rfl:   •  Loratadine-Pseudoephedrine (CLARITIN-D 24 HOUR PO), Take 1 tablet by mouth Daily., Disp: , Rfl:   •  mirtazapine (REMERON) 7.5 MG tablet, Take  by mouth., Disp: , Rfl:   •  omeprazole (priLOSEC) 40 MG  capsule, TK 1 C PO D, Disp: , Rfl: 2  •  ondansetron (ZOFRAN) 8 MG tablet, TK 1 T PO Q 8 H PRF NAUSEA OR VOM, Disp: , Rfl: 2  •  promethazine (PHENERGAN) 25 MG tablet, TK 1 T PO Q 4 TO 6 H PRF NAUSEA OR VOM, Disp: , Rfl: 2  •  raNITIdine (ZANTAC) 150 MG tablet, Take 150 mg by mouth 2 (Two) Times a Day., Disp: , Rfl:   •  Red Yeast Rice Extract 600 MG capsule, Take 4 capsules by mouth Daily., Disp: , Rfl:   •  Unable to find, 1 each 1 (One) Time. Dr. Burrell prescribed anxiety med; starts with letter a, Disp: , Rfl:     Allergies   Allergen Reactions   • Morphine Rash       Family History   Problem Relation Age of Onset   • Breast cancer Sister 58       Cancer-related family history includes Breast cancer (age of onset: 58) in her sister.    Social History     Tobacco Use   • Smoking status: Light Tobacco Smoker     Packs/day: 0.00     Years: 10.00     Pack years: 0.00     Types: Cigarettes     Start date: 2009   • Smokeless tobacco: Never Used   • Tobacco comment: Attempting to quit   Substance Use Topics   • Alcohol use: No     Frequency: Never   • Drug use: No       I have reviewed the history of present illness, past medical history, family history, social history, lab results, all notes and other records since the patient was last seen on 7/8/2019    SUBJECTIVE:      Patient in my office for follow-up during chemotherapy.  Patient completed 6 cycles of treatment tolerated poorly maintain her weight continues to smoke continues to work every day.  No fevers night sweats weight loss .  Palpitations resolved after initiating Xanax and she is very happy about it this is also helping her at work    ROS:       Review of Systems   Constitutional: Negative for fever.   HENT: Negative for nosebleeds and trouble swallowing.    Eyes: Negative for visual disturbance.   Respiratory: Negative for cough, shortness of breath and wheezing.    Cardiovascular: Negative for chest pain.   Gastrointestinal: Negative for abdominal pain  "and blood in stool.   Endocrine: Negative for cold intolerance.   Genitourinary: Negative for dysuria and hematuria.   Musculoskeletal: Negative for joint swelling.   Skin: Negative for rash.   Allergic/Immunologic: Negative for environmental allergies.   Neurological: Negative for seizures.   Hematological: Does not bruise/bleed easily.   Psychiatric/Behavioral: The patient is not nervous/anxious.          Objective:       Vitals:    08/14/19 1244   BP: 142/61   Pulse: 81   Resp: 20   Temp: 98.1 °F (36.7 °C)   Weight: 50.8 kg (112 lb)   Height: 165.1 cm (65\")   PainSc: 0-No pain         PHYSICAL EXAM:       Physical Exam   Constitutional: She is oriented to person, place, and time. No distress.   HENT:   Head: Normocephalic and atraumatic.   Eyes: Conjunctivae and EOM are normal. Right eye exhibits no discharge. Left eye exhibits no discharge. No scleral icterus.   Neck: Normal range of motion. Neck supple. No thyromegaly present.   Cardiovascular: Normal rate, regular rhythm and normal heart sounds. Exam reveals no gallop and no friction rub.   Pulmonary/Chest: Effort normal. No stridor. No respiratory distress. She has no wheezes.   Abdominal: Soft. Bowel sounds are normal. She exhibits no mass. There is no tenderness. There is no rebound and no guarding.   Musculoskeletal: Normal range of motion. She exhibits no tenderness.   Lymphadenopathy:     She has no cervical adenopathy.   Neurological: She is alert and oriented to person, place, and time. She exhibits normal muscle tone.   Skin: Skin is warm. No rash noted. She is not diaphoretic. No erythema.   Psychiatric: She has a normal mood and affect. Her behavior is normal.   Nursing note and vitals reviewed.       RECENT LABS:     WBC   Date Value Ref Range Status   08/07/2019 5.16 3.40 - 10.80 10*3/mm3 Final     RBC   Date Value Ref Range Status   08/07/2019 3.54 (L) 3.77 - 5.28 10*6/mm3 Final   10/29/2018 4.82 3.80 - 5.10 Million/uL Final     Hemoglobin   Date " Value Ref Range Status   08/07/2019 11.8 (L) 12.0 - 15.9 g/dL Final     Hematocrit   Date Value Ref Range Status   08/07/2019 36.3 34.0 - 46.6 % Final     MCV   Date Value Ref Range Status   08/07/2019 102.5 (H) 79.0 - 97.0 fL Final     MCH   Date Value Ref Range Status   08/07/2019 33.3 (H) 26.6 - 33.0 pg Final     MCHC   Date Value Ref Range Status   08/07/2019 32.5 31.5 - 35.7 g/dL Final     RDW   Date Value Ref Range Status   08/07/2019 15.3 12.3 - 15.4 % Final     RDW-SD   Date Value Ref Range Status   08/07/2019 57.8 (H) 37.0 - 54.0 fl Final     MPV   Date Value Ref Range Status   08/07/2019 9.4 6.0 - 12.0 fL Final     Platelets   Date Value Ref Range Status   08/07/2019 248 140 - 450 10*3/mm3 Final     Neutrophil %   Date Value Ref Range Status   08/07/2019 58.8 42.7 - 76.0 % Final     Lymphocyte %   Date Value Ref Range Status   08/07/2019 27.7 19.6 - 45.3 % Final     Monocyte %   Date Value Ref Range Status   08/07/2019 11.2 5.0 - 12.0 % Final     Eosinophil %   Date Value Ref Range Status   08/07/2019 2.1 0.3 - 6.2 % Final     Basophil %   Date Value Ref Range Status   08/07/2019 0.2 0.0 - 1.5 % Final     Neutrophils, Absolute   Date Value Ref Range Status   08/07/2019 3.03 1.70 - 7.00 10*3/mm3 Final     Lymphocytes, Absolute   Date Value Ref Range Status   08/07/2019 1.43 0.70 - 3.10 10*3/mm3 Final     Monocytes, Absolute   Date Value Ref Range Status   08/07/2019 0.58 0.10 - 0.90 10*3/mm3 Final     Eosinophils, Absolute   Date Value Ref Range Status   08/07/2019 0.11 0.00 - 0.40 10*3/mm3 Final     Basophils, Absolute   Date Value Ref Range Status   08/07/2019 0.01 0.00 - 0.20 10*3/mm3 Final     nRBC   Date Value Ref Range Status   03/05/2019 0 0 /100[WBCs] Final       Lab Results   Component Value Date    GLUCOSE 92 07/17/2019    BUN 8 07/17/2019    CREATININE 0.80 07/17/2019    EGFRIFNONA 74 07/17/2019    EGFRIFAFRI >60 06/10/2019    BCR 10.0 07/17/2019    K 3.9 07/17/2019    CO2 25.0 07/17/2019     CALCIUM 8.8 (L) 07/17/2019    ALBUMIN 3.50 07/17/2019    LABIL2 1.3 06/10/2019    AST 18 07/17/2019    ALT 18 07/17/2019         Assessment/Plan      ASSESSMENT:     1. Left breast pT1b, pN0, Mx infiltrating ductal carcinoma, ER negative, ND negative and HER-2/martha positive.   2. Chemo induced anemia  3. Anxiety  4. ECOG 1      PLAN:      1. Reviewed the laboratory work-up with the patient patient has chemo induced anemia no growth factors at this time.  2. Patient completed adjuvant Taxotere carboplatin and Herceptin treatments for 6 cycles.  Patient will be treated with IV 3 weeks single single agent Herceptin to complete a total of 1 year treatments.  Check CMP CA-27-29 today.  We will obtain 2D echo now and every 3 months.  3. Xanax will be continued at the reduced dose of 0.25 mg a day   4. I will see her back in my office in 4 weeks recheck CBC CMP and CA-27-29 at that time    I have reviewed labs results, imaging, vitals, and medications with the patient today.         Patient verbalized understanding and is in agreement of the above plan.          This report was compiled using Dragon voice recognition software. I have made every effort to proof read this document; however, typographical errors may persist.

## 2019-08-19 ENCOUNTER — TELEPHONE (OUTPATIENT)
Dept: ONCOLOGY | Facility: CLINIC | Age: 59
End: 2019-08-19

## 2019-08-19 ENCOUNTER — HOSPITAL ENCOUNTER (OUTPATIENT)
Dept: INTERVENTIONAL RADIOLOGY/VASCULAR | Facility: HOSPITAL | Age: 59
Discharge: HOME | End: 2019-08-19
Admitting: INTERNAL MEDICINE

## 2019-08-19 VITALS — WEIGHT: 112 LBS | HEIGHT: 65 IN | BODY MASS INDEX: 18.66 KG/M2

## 2019-08-19 DIAGNOSIS — IMO0001 OTHER COMPLICATION DUE TO VENOUS ACCESS DEVICE, INITIAL ENCOUNTER: Primary | ICD-10-CM

## 2019-08-19 DIAGNOSIS — C50.912 INFILTRATING DUCTAL CARCINOMA OF LEFT BREAST (HCC): ICD-10-CM

## 2019-08-19 PROCEDURE — 36598 INJ W/FLUOR EVAL CV DEVICE: CPT

## 2019-08-19 PROCEDURE — 0 IOPAMIDOL PER 1 ML: Performed by: INTERNAL MEDICINE

## 2019-08-19 RX ORDER — CETIRIZINE HYDROCHLORIDE 10 MG/1
10 TABLET ORAL DAILY
COMMUNITY
End: 2020-02-12

## 2019-08-19 RX ADMIN — IOPAMIDOL 8 ML: 755 INJECTION, SOLUTION INTRAVENOUS at 12:36

## 2019-08-19 NOTE — NURSING NOTE
Dr. Alfaro reviewed ICS and reports crack in port tubing near clavicle. Dr. Alfaro states port should not be used any further and that port should be removed. Per Dr. Alfaro, do not heparinize port prior to deaccessing.

## 2019-08-19 NOTE — NURSING NOTE
Pt discharged home in stable condition on room air and ambulated from dept. Independently. Pt. Has bandaid in place to right chest port site. Pt is alert and oriented x3 and on room air. Message left on [facility] nursing triage line with update on patient port status.

## 2019-08-19 NOTE — TELEPHONE ENCOUNTER
Received call from Jazlyn MOORE at [facility] reporting that pt's port dye study revealed a crack in the tubing near her clavicle.  We are NOT to use port and it needs to be removed.

## 2019-08-19 NOTE — DISCHARGE INSTRUCTIONS
Dr. Alfaro reviewed infusaport contrast study and reports port tubing has crack in it near the clavicle. Port is unable to be used per Dr. Alfaro. Port will need to be removed. Leave bandaid in place for the next 24 hours to prevent infection. Dr. Alfaro states to not heparinize port.

## 2019-08-19 NOTE — NURSING NOTE
Pt ambulated into IR procedure suite and placed self in supine position on IR exam table. Pt. Provided warm blanket for comfort.

## 2019-08-19 NOTE — NURSING NOTE
Pt right chest infusaport accessed using sterile technique with 20 g, 0.75 in powerport needle. Port flushes easily, after aspirating heparin lock, and good blood return was noted. Port flushed with NS 10 mL and sterile dressing applied.

## 2019-08-19 NOTE — TELEPHONE ENCOUNTER
I put her referral in as urgent.  She still hs a full year of Herceptin to receive so we will need a removal/replacement.

## 2019-08-19 NOTE — TELEPHONE ENCOUNTER
Have entered a referral to Dr. Lockett for port removal. I believe the patient is on maintenance Herceptin and may need a port replacement also.

## 2019-08-20 ENCOUNTER — TELEPHONE (OUTPATIENT)
Dept: ONCOLOGY | Facility: CLINIC | Age: 59
End: 2019-08-20

## 2019-08-20 NOTE — TELEPHONE ENCOUNTER
Received call from Tierney at Dr Mayfield's office stating that pt is wanting to speak w/ someone prior to being scheduled to have her port removed and another one placed.  Spoke w/ pt and she states that she doesn't want another port placed even though she has eleven more cycles of Herceptin.  Pt is wanting to know if she really needs another port or if we can just use a peripheral iv for her treatments.

## 2019-08-20 NOTE — TELEPHONE ENCOUNTER
Patient requesting to continue treatment without port. Herceptin is not a vesicant. Ok to continue remaining doses with peripheral IV unless venous access becomes difficult.

## 2019-08-21 NOTE — TELEPHONE ENCOUNTER
Attempted to call pt and let her know that she does not need to have her port replaced.  No answer.  Voicemail left for pt letting her know the above information.  Also called Tierney bauman w/ Dr Mayfield's office to let her know that she just needs her port removed and not replaced per pt request and it is okay with us.  She v/u.

## 2019-08-22 ENCOUNTER — TELEPHONE (OUTPATIENT)
Dept: SURGERY | Facility: CLINIC | Age: 59
End: 2019-08-22

## 2019-08-22 NOTE — TELEPHONE ENCOUNTER
Left message for patient to call back to schedule appt with Dr Mayfield for consultation for Port removal

## 2019-08-26 DIAGNOSIS — C50.912 INFILTRATING DUCTAL CARCINOMA OF LEFT BREAST (HCC): ICD-10-CM

## 2019-08-26 RX ORDER — SODIUM CHLORIDE 9 MG/ML
250 INJECTION, SOLUTION INTRAVENOUS ONCE
Status: CANCELLED | OUTPATIENT
Start: 2019-08-28

## 2019-08-27 ENCOUNTER — OFFICE VISIT (OUTPATIENT)
Dept: SURGERY | Facility: CLINIC | Age: 59
End: 2019-08-27

## 2019-08-27 VITALS
WEIGHT: 110 LBS | BODY MASS INDEX: 18.33 KG/M2 | HEIGHT: 65 IN | TEMPERATURE: 97.1 F | OXYGEN SATURATION: 94 % | DIASTOLIC BLOOD PRESSURE: 85 MMHG | HEART RATE: 86 BPM | SYSTOLIC BLOOD PRESSURE: 150 MMHG

## 2019-08-27 DIAGNOSIS — C50.912 INFILTRATING DUCTAL CARCINOMA OF LEFT BREAST (HCC): Primary | ICD-10-CM

## 2019-08-27 PROBLEM — D05.12 DUCTAL CARCINOMA IN SITU (DCIS) OF LEFT BREAST: Status: ACTIVE | Noted: 2019-08-27

## 2019-08-27 PROBLEM — E78.5 HYPERLIPIDEMIA: Status: ACTIVE | Noted: 2019-08-27

## 2019-08-27 PROBLEM — R53.83 MALAISE AND FATIGUE: Status: ACTIVE | Noted: 2019-08-27

## 2019-08-27 PROBLEM — M15.9 GENERALIZED OSTEOARTHROSIS, UNSPECIFIED SITE: Status: ACTIVE | Noted: 2019-08-27

## 2019-08-27 PROBLEM — R53.81 MALAISE AND FATIGUE: Status: ACTIVE | Noted: 2019-08-27

## 2019-08-27 PROCEDURE — 99213 OFFICE O/P EST LOW 20 MIN: CPT | Performed by: SURGERY

## 2019-08-27 RX ORDER — SODIUM CHLORIDE 9 MG/ML
100 INJECTION, SOLUTION INTRAVENOUS CONTINUOUS
Status: CANCELLED | OUTPATIENT
Start: 2019-08-27

## 2019-08-27 NOTE — PROGRESS NOTES
"Korina Magallanes is a 59 y.o. female.   Chief Complaint   Patient presents with   • Vascular Access Problem     Right fractured port     /85 (BP Location: Right arm, Patient Position: Sitting, Cuff Size: Adult)   Pulse 86   Temp 97.1 °F (36.2 °C) (Oral)   Ht 165.1 cm (65\")   Wt 49.9 kg (110 lb)   SpO2 94%   BMI 18.30 kg/m²     HISTORY OF PRESENT ILLNESS:  [id number] invasive ductal carcinoma of the left breast has post mastectomy and sentinel lymph node biopsy.  She has undergone chemotherapy and is getting ongoing treatment with Herceptin.  Her port has been somewhat positional and recently she underwent a port study which shows that there is a fracture of the mid portion of the catheter with extravasation of contrast.  For the port is no letter to use.  She can continue to get her infusion via a peripheral IV and is here today for port removal.  She denies any new breast issues including breast pain or axillar swelling.  She has been back to work and seems to be doing very well.      Outpatient Encounter Medications as of 8/27/2019   Medication Sig Dispense Refill   • Acetaminophen 325 MG capsule TYLENOL CAPS     • ALPRAZolam (XANAX) 0.25 MG tablet Take 1 tablet by mouth Daily. 30 tablet 3   • ALPRAZolam (XANAX) 0.5 MG tablet Take 1 tablet by mouth Daily. 30 tablet 0   • cetirizine (zyrTEC) 10 MG tablet Take 10 mg by mouth Daily.     • raNITIdine (ZANTAC) 150 MG tablet Take 150 mg by mouth 2 (Two) Times a Day.       No facility-administered encounter medications on file as of 8/27/2019.          The following portions of the patient's history were reviewed and updated as appropriate: allergies, current medications, past family history, past medical history, past social history, past surgical history and problem list.    Review of Systems  No new fevers chills nausea vomiting incisional issues left chest pain or masses.  Objective   Physical Exam   Constitutional: She appears " well-developed and well-nourished.   HENT:   Head: Normocephalic and atraumatic.   Eyes: Conjunctivae and EOM are normal.   Cardiovascular: Normal rate.   Pulmonary/Chest:   Her right chest port incision has healed without any erythema or evidence of infection at present.  Her left chest mastectomy incision has healed well without any evidence of mass or wound breakdown.  There is no evidence of left axillary lymphadenopathy.         Assessment/Plan   Lisa was seen today for vascular access problem.    Diagnoses and all orders for this visit:    Infiltrating ductal carcinoma of left breast (CMS/HCC)  -     Case Request; Standing  -     sodium chloride 0.9 % infusion  -     Case Request    Other orders  -     Follow Anesthesia Guidelines / Standing Orders; Future  -     Provide NPO Instructions to Patient; Future  -     Follow Anesthesia Guidelines / Standing Orders; Standing  -     Verify NPO Status; Standing  -     Obtain Informed Consent; Standing  -     Have Patient Void Prior to Procedure; Standing  -     Instructions on Coughing, Deep Breathing & Incentive Spirometry; Standing  -     Oxygen Therapy- Nasal Cannula; Titrate for SPO2: 90% - 95%; Standing  -     Notify Physician - Standard; Standing    We will go ahead and remove her right chest [id number].  I counseled her about the recent port removal.  In this case I think is very unlikely that there will be a complete port removal but since this fracture that is not possible to have briefly mentioned that if this were to happen she would need additional procedures for removal of the intravascular portion of catheter.  Based on her discussion with oncology she will we will continue to get her adjuvant therapy be a peripheral IV.  If She has trouble with her peripheral IVs could always replace her port at a later date.      Levy Mayfield MD  8/27/2019  11:40 AM

## 2019-08-28 ENCOUNTER — HOSPITAL ENCOUNTER (OUTPATIENT)
Dept: ONCOLOGY | Facility: HOSPITAL | Age: 59
Setting detail: INFUSION SERIES
Discharge: HOME | End: 2019-08-28

## 2019-08-28 ENCOUNTER — APPOINTMENT (OUTPATIENT)
Dept: ONCOLOGY | Facility: HOSPITAL | Age: 59
End: 2019-08-28

## 2019-08-28 VITALS
BODY MASS INDEX: 18.47 KG/M2 | TEMPERATURE: 98.4 F | WEIGHT: 111 LBS | RESPIRATION RATE: 16 BRPM | SYSTOLIC BLOOD PRESSURE: 131 MMHG | HEART RATE: 82 BPM | DIASTOLIC BLOOD PRESSURE: 82 MMHG

## 2019-08-28 DIAGNOSIS — C50.912 INFILTRATING DUCTAL CARCINOMA OF LEFT BREAST (HCC): Primary | ICD-10-CM

## 2019-08-28 LAB
BASOPHILS # BLD AUTO: 0.03 10*3/MM3 (ref 0–0.2)
BASOPHILS NFR BLD AUTO: 0.6 % (ref 0–1.5)
DEPRECATED RDW RBC AUTO: 50.4 FL (ref 37–54)
EOSINOPHIL # BLD AUTO: 0.38 10*3/MM3 (ref 0–0.4)
EOSINOPHIL NFR BLD AUTO: 8.2 % (ref 0.3–6.2)
ERYTHROCYTE [DISTWIDTH] IN BLOOD BY AUTOMATED COUNT: 13.9 % (ref 12.3–15.4)
HCT VFR BLD AUTO: 38.1 % (ref 34–46.6)
HGB BLD-MCNC: 13.3 G/DL (ref 12–15.9)
LYMPHOCYTES # BLD AUTO: 1.68 10*3/MM3 (ref 0.7–3.1)
LYMPHOCYTES NFR BLD AUTO: 36.4 % (ref 19.6–45.3)
MCH RBC QN AUTO: 34.5 PG (ref 26.6–33)
MCHC RBC AUTO-ENTMCNC: 34.9 G/DL (ref 31.5–35.7)
MCV RBC AUTO: 99 FL (ref 79–97)
MONOCYTES # BLD AUTO: 0.44 10*3/MM3 (ref 0.1–0.9)
MONOCYTES NFR BLD AUTO: 9.5 % (ref 5–12)
NEUTROPHILS # BLD AUTO: 2.09 10*3/MM3 (ref 1.7–7)
NEUTROPHILS NFR BLD AUTO: 45.3 % (ref 42.7–76)
PLATELET # BLD AUTO: 155 10*3/MM3 (ref 140–450)
PMV BLD AUTO: 10.1 FL (ref 6–12)
RBC # BLD AUTO: 3.85 10*6/MM3 (ref 3.77–5.28)
WBC NRBC COR # BLD: 4.62 10*3/MM3 (ref 3.4–10.8)

## 2019-08-28 PROCEDURE — 25010000002 TRASTUZUMAB PER 10 MG: Performed by: NURSE PRACTITIONER

## 2019-08-28 PROCEDURE — 85025 COMPLETE CBC W/AUTO DIFF WBC: CPT | Performed by: NURSE PRACTITIONER

## 2019-08-28 PROCEDURE — 96413 CHEMO IV INFUSION 1 HR: CPT | Performed by: INTERNAL MEDICINE

## 2019-08-28 RX ORDER — SODIUM CHLORIDE 0.9 % (FLUSH) 0.9 %
10 SYRINGE (ML) INJECTION AS NEEDED
Status: DISCONTINUED | OUTPATIENT
Start: 2019-08-28 | End: 2019-08-29 | Stop reason: HOSPADM

## 2019-08-28 RX ORDER — SODIUM CHLORIDE 0.9 % (FLUSH) 0.9 %
10 SYRINGE (ML) INJECTION AS NEEDED
Status: CANCELLED | OUTPATIENT
Start: 2019-08-28

## 2019-08-28 RX ORDER — SODIUM CHLORIDE 9 MG/ML
250 INJECTION, SOLUTION INTRAVENOUS ONCE
Status: DISCONTINUED | OUTPATIENT
Start: 2019-08-28 | End: 2019-08-29 | Stop reason: HOSPADM

## 2019-08-28 RX ADMIN — SODIUM CHLORIDE, PRESERVATIVE FREE 10 ML: 5 INJECTION INTRAVENOUS at 11:23

## 2019-08-28 RX ADMIN — TRASTUZUMAB 300 MG: 150 INJECTION, POWDER, LYOPHILIZED, FOR SOLUTION INTRAVENOUS at 10:43

## 2019-08-30 ENCOUNTER — APPOINTMENT (OUTPATIENT)
Dept: PREADMISSION TESTING | Facility: HOSPITAL | Age: 59
End: 2019-08-30

## 2019-08-30 VITALS
HEART RATE: 97 BPM | DIASTOLIC BLOOD PRESSURE: 89 MMHG | HEIGHT: 65 IN | OXYGEN SATURATION: 95 % | SYSTOLIC BLOOD PRESSURE: 151 MMHG | WEIGHT: 111.38 LBS | BODY MASS INDEX: 18.56 KG/M2

## 2019-09-06 ENCOUNTER — ANESTHESIA EVENT (OUTPATIENT)
Dept: PERIOP | Facility: HOSPITAL | Age: 59
End: 2019-09-06

## 2019-09-09 ENCOUNTER — ANESTHESIA (OUTPATIENT)
Dept: PERIOP | Facility: HOSPITAL | Age: 59
End: 2019-09-09

## 2019-09-09 ENCOUNTER — HOSPITAL ENCOUNTER (OUTPATIENT)
Facility: HOSPITAL | Age: 59
Setting detail: HOSPITAL OUTPATIENT SURGERY
Discharge: HOME | End: 2019-09-09
Attending: SURGERY | Admitting: SURGERY

## 2019-09-09 VITALS
HEART RATE: 47 BPM | TEMPERATURE: 97.3 F | OXYGEN SATURATION: 97 % | RESPIRATION RATE: 12 BRPM | HEIGHT: 65 IN | SYSTOLIC BLOOD PRESSURE: 106 MMHG | BODY MASS INDEX: 18.07 KG/M2 | DIASTOLIC BLOOD PRESSURE: 64 MMHG | WEIGHT: 108.47 LBS

## 2019-09-09 DIAGNOSIS — C50.912 INFILTRATING DUCTAL CARCINOMA OF LEFT BREAST (HCC): ICD-10-CM

## 2019-09-09 PROCEDURE — 25010000002 MIDAZOLAM PER 1 MG: Performed by: NURSE ANESTHETIST, CERTIFIED REGISTERED

## 2019-09-09 PROCEDURE — 25010000002 PROPOFOL 10 MG/ML EMULSION: Performed by: NURSE ANESTHETIST, CERTIFIED REGISTERED

## 2019-09-09 PROCEDURE — 36589 REMOVAL TUNNELED CV CATH: CPT | Performed by: SURGERY

## 2019-09-09 RX ORDER — LIDOCAINE HYDROCHLORIDE AND EPINEPHRINE 10; 10 MG/ML; UG/ML
INJECTION, SOLUTION INFILTRATION; PERINEURAL AS NEEDED
Status: DISCONTINUED | OUTPATIENT
Start: 2019-09-09 | End: 2019-09-09 | Stop reason: HOSPADM

## 2019-09-09 RX ORDER — FENTANYL CITRATE 50 UG/ML
25 INJECTION, SOLUTION INTRAMUSCULAR; INTRAVENOUS
Status: DISCONTINUED | OUTPATIENT
Start: 2019-09-09 | End: 2019-09-09 | Stop reason: HOSPADM

## 2019-09-09 RX ORDER — ONDANSETRON 2 MG/ML
4 INJECTION INTRAMUSCULAR; INTRAVENOUS ONCE AS NEEDED
Status: DISCONTINUED | OUTPATIENT
Start: 2019-09-09 | End: 2019-09-09 | Stop reason: HOSPADM

## 2019-09-09 RX ORDER — HYDRALAZINE HYDROCHLORIDE 20 MG/ML
5 INJECTION INTRAMUSCULAR; INTRAVENOUS
Status: DISCONTINUED | OUTPATIENT
Start: 2019-09-09 | End: 2019-09-09 | Stop reason: HOSPADM

## 2019-09-09 RX ORDER — PROMETHAZINE HYDROCHLORIDE 25 MG/1
25 SUPPOSITORY RECTAL ONCE AS NEEDED
Status: DISCONTINUED | OUTPATIENT
Start: 2019-09-09 | End: 2019-09-09 | Stop reason: HOSPADM

## 2019-09-09 RX ORDER — MIDAZOLAM HYDROCHLORIDE 1 MG/ML
INJECTION INTRAMUSCULAR; INTRAVENOUS AS NEEDED
Status: DISCONTINUED | OUTPATIENT
Start: 2019-09-09 | End: 2019-09-09 | Stop reason: SURG

## 2019-09-09 RX ORDER — PROMETHAZINE HYDROCHLORIDE 25 MG/ML
6.25 INJECTION, SOLUTION INTRAMUSCULAR; INTRAVENOUS ONCE AS NEEDED
Status: DISCONTINUED | OUTPATIENT
Start: 2019-09-09 | End: 2019-09-09 | Stop reason: HOSPADM

## 2019-09-09 RX ORDER — LIDOCAINE HYDROCHLORIDE 20 MG/ML
INJECTION, SOLUTION EPIDURAL; INFILTRATION; INTRACAUDAL; PERINEURAL AS NEEDED
Status: DISCONTINUED | OUTPATIENT
Start: 2019-09-09 | End: 2019-09-09 | Stop reason: SURG

## 2019-09-09 RX ORDER — PROMETHAZINE HYDROCHLORIDE 25 MG/1
25 TABLET ORAL ONCE AS NEEDED
Status: DISCONTINUED | OUTPATIENT
Start: 2019-09-09 | End: 2019-09-09 | Stop reason: HOSPADM

## 2019-09-09 RX ORDER — LABETALOL HYDROCHLORIDE 5 MG/ML
5 INJECTION, SOLUTION INTRAVENOUS
Status: DISCONTINUED | OUTPATIENT
Start: 2019-09-09 | End: 2019-09-09 | Stop reason: HOSPADM

## 2019-09-09 RX ORDER — PROPOFOL 10 MG/ML
VIAL (ML) INTRAVENOUS AS NEEDED
Status: DISCONTINUED | OUTPATIENT
Start: 2019-09-09 | End: 2019-09-09 | Stop reason: SURG

## 2019-09-09 RX ORDER — SODIUM CHLORIDE 0.9 % (FLUSH) 0.9 %
3 SYRINGE (ML) INJECTION EVERY 12 HOURS SCHEDULED
Status: DISCONTINUED | OUTPATIENT
Start: 2019-09-09 | End: 2019-09-09 | Stop reason: HOSPADM

## 2019-09-09 RX ORDER — MEPERIDINE HYDROCHLORIDE 25 MG/ML
12.5 INJECTION INTRAMUSCULAR; INTRAVENOUS; SUBCUTANEOUS
Status: DISCONTINUED | OUTPATIENT
Start: 2019-09-09 | End: 2019-09-09 | Stop reason: HOSPADM

## 2019-09-09 RX ORDER — IPRATROPIUM BROMIDE AND ALBUTEROL SULFATE 2.5; .5 MG/3ML; MG/3ML
3 SOLUTION RESPIRATORY (INHALATION) ONCE AS NEEDED
Status: DISCONTINUED | OUTPATIENT
Start: 2019-09-09 | End: 2019-09-09 | Stop reason: HOSPADM

## 2019-09-09 RX ORDER — SODIUM CHLORIDE, SODIUM LACTATE, POTASSIUM CHLORIDE, CALCIUM CHLORIDE 600; 310; 30; 20 MG/100ML; MG/100ML; MG/100ML; MG/100ML
9 INJECTION, SOLUTION INTRAVENOUS CONTINUOUS PRN
Status: DISCONTINUED | OUTPATIENT
Start: 2019-09-09 | End: 2019-09-09 | Stop reason: HOSPADM

## 2019-09-09 RX ORDER — ACETAMINOPHEN 325 MG/1
650 TABLET ORAL ONCE AS NEEDED
Status: DISCONTINUED | OUTPATIENT
Start: 2019-09-09 | End: 2019-09-09 | Stop reason: HOSPADM

## 2019-09-09 RX ORDER — ACETAMINOPHEN 650 MG/1
650 SUPPOSITORY RECTAL ONCE AS NEEDED
Status: DISCONTINUED | OUTPATIENT
Start: 2019-09-09 | End: 2019-09-09 | Stop reason: HOSPADM

## 2019-09-09 RX ORDER — SODIUM CHLORIDE 9 MG/ML
100 INJECTION, SOLUTION INTRAVENOUS CONTINUOUS
Status: DISCONTINUED | OUTPATIENT
Start: 2019-09-09 | End: 2019-09-09 | Stop reason: HOSPADM

## 2019-09-09 RX ORDER — EPHEDRINE SULFATE 50 MG/ML
5 INJECTION, SOLUTION INTRAVENOUS ONCE AS NEEDED
Status: DISCONTINUED | OUTPATIENT
Start: 2019-09-09 | End: 2019-09-09 | Stop reason: HOSPADM

## 2019-09-09 RX ORDER — SODIUM CHLORIDE 0.9 % (FLUSH) 0.9 %
3-10 SYRINGE (ML) INJECTION AS NEEDED
Status: DISCONTINUED | OUTPATIENT
Start: 2019-09-09 | End: 2019-09-09 | Stop reason: HOSPADM

## 2019-09-09 RX ORDER — PHENYLEPHRINE HCL IN 0.9% NACL 0.5 MG/5ML
.5-3 SYRINGE (ML) INTRAVENOUS
Status: DISCONTINUED | OUTPATIENT
Start: 2019-09-09 | End: 2019-09-09 | Stop reason: HOSPADM

## 2019-09-09 RX ORDER — PROMETHAZINE HYDROCHLORIDE 25 MG/ML
12.5 INJECTION, SOLUTION INTRAMUSCULAR; INTRAVENOUS ONCE AS NEEDED
Status: DISCONTINUED | OUTPATIENT
Start: 2019-09-09 | End: 2019-09-09 | Stop reason: HOSPADM

## 2019-09-09 RX ORDER — LIDOCAINE HYDROCHLORIDE 10 MG/ML
0.5 INJECTION, SOLUTION EPIDURAL; INFILTRATION; INTRACAUDAL; PERINEURAL ONCE AS NEEDED
Status: DISCONTINUED | OUTPATIENT
Start: 2019-09-09 | End: 2019-09-09 | Stop reason: HOSPADM

## 2019-09-09 RX ADMIN — PROPOFOL 50 MG: 10 INJECTION, EMULSION INTRAVENOUS at 13:03

## 2019-09-09 RX ADMIN — LIDOCAINE HYDROCHLORIDE 100 MG: 20 INJECTION, SOLUTION EPIDURAL; INFILTRATION; INTRACAUDAL; PERINEURAL at 12:59

## 2019-09-09 RX ADMIN — PROPOFOL 100 MG: 10 INJECTION, EMULSION INTRAVENOUS at 13:08

## 2019-09-09 RX ADMIN — PROPOFOL 50 MG: 10 INJECTION, EMULSION INTRAVENOUS at 13:13

## 2019-09-09 RX ADMIN — SODIUM CHLORIDE, SODIUM LACTATE, POTASSIUM CHLORIDE, AND CALCIUM CHLORIDE 9 ML/HR: 600; 310; 30; 20 INJECTION, SOLUTION INTRAVENOUS at 10:36

## 2019-09-09 RX ADMIN — MIDAZOLAM 2 MG: 1 INJECTION INTRAMUSCULAR; INTRAVENOUS at 12:56

## 2019-09-09 RX ADMIN — PROPOFOL 30 MG: 10 INJECTION, EMULSION INTRAVENOUS at 13:18

## 2019-09-09 RX ADMIN — PROPOFOL 50 MG: 10 INJECTION, EMULSION INTRAVENOUS at 12:59

## 2019-09-09 NOTE — ANESTHESIA POSTPROCEDURE EVALUATION
Patient: Lisa Magallanes    Procedure Summary     Date:  09/09/19 Room / Location:  [facility] OR 07 / [facility] MAIN OR    Anesthesia Start:  1256 Anesthesia Stop:  1331    Procedure:  REMOVAL VENOUS ACCESS DEVICE (Right ) Diagnosis:       Infiltrating ductal carcinoma of left breast (CMS/HCC)      (Infiltrating ductal carcinoma of left breast (CMS/HCC) [C50.912])    Surgeon:  Levy Mayfield MD Provider:  Nahid Andres MD    Anesthesia Type:  MAC ASA Status:  3          Anesthesia Type: MAC  Last vitals  BP   112/59 (09/09/19 1410)   Temp   96.8 °F (36 °C) (09/09/19 1410)   Pulse   51 (09/09/19 1410)   Resp   15 (09/09/19 1410)     SpO2   98 % (09/09/19 1410)     Post Anesthesia Care and Evaluation    Patient location during evaluation: bedside  Patient participation: complete - patient participated  Level of consciousness: awake and alert  Pain score: 1  Pain management: adequate  Airway patency: patent  Anesthetic complications: No anesthetic complications  PONV Status: none  Cardiovascular status: acceptable  Respiratory status: acceptable  Hydration status: acceptable  Post Neuraxial Block status: Motor and sensory function returned to baseline

## 2019-09-09 NOTE — ANESTHESIA PREPROCEDURE EVALUATION
Anesthesia Evaluation     Patient summary reviewed and Nursing notes reviewed   NPO Solid Status: > 6 hours  NPO Liquid Status: > 6 hours           Airway   Mallampati: II  TM distance: >3 FB  Neck ROM: full  No difficulty expected  Dental - normal exam     Pulmonary - negative pulmonary ROS and normal exam    breath sounds clear to auscultation  Cardiovascular - normal exam    ECG reviewed  Rhythm: regular  Rate: normal    (+) hyperlipidemia,       Neuro/Psych- negative ROS  GI/Hepatic/Renal/Endo    (+)  GERD,      Musculoskeletal     Abdominal  - normal exam    Abdomen: soft.  Bowel sounds: normal.   Substance History - negative use     OB/GYN negative ob/gyn ROS         Other   (+) arthritis   history of cancer                    Anesthesia Plan    ASA 3     MAC     intravenous induction   Anesthetic plan, all risks, benefits, and alternatives have been provided, discussed and informed consent has been obtained with: patient.    Plan discussed with CRNA.

## 2019-09-09 NOTE — OP NOTE
Operative Report:    Patient Name:  Lisa Magallanes  YOB: 1960    Date of Surgery:  9/9/2019     Indications: 59-year-old lady with a history of left breast cancer status post mastectomy who has undergone adjuvant chemotherapy.    Pre-op Diagnosis:   Infiltrating ductal carcinoma of left breast (CMS/HCC) [C50.912]       Post-Op Diagnosis Codes:     * Infiltrating ductal carcinoma of left breast (CMS/HCC) [C50.912]    Procedure/CPT® Codes:      Procedure(s):  REMOVAL VENOUS ACCESS DEVICE    Staff:  Surgeon(s):  Levy Mayfield MD         Anesthesia: Monitored Anesthesia Care    Estimated Blood Loss: minimal    Implants:    Nothing was implanted during the procedure    Specimen:          The port was placed in a specimen bag for evaluation of its fracture in the mid distance of the catheter.        Findings: The catheter had 2 approximately 1.5 cm cracks at about 10 cm from the tip    Complications: None    Description of Procedure: Patient was taken to the operating room placed on the table in the supine position under sedation.  Her right chest was prepped and draped normal sterile fashion.  A timeout was performed identifying the correct patient procedure and site of operation.  I began the operation by infiltrating the skin is obtains tissues with 1% lidocaine with epinephrine.  A skin incision was made with a 15 blade scalpel and dissection around the port was performed with the Bovie.  The port hub was freed from the capsule and the catheter easily slid back and was removed in its entirety.  The catheter had 2 slit like cracks approximately 10 cm from the tip each were about 1-1/2 cm in length.  The catheter exit site was sutured closed using an interrupted 3-0 Vicryl suture.  The wound was then irrigated.  The skin was closed with interrupted 3-0 Vicryl suture running 4-0 Vicryl suture and skin affix.      Levy Mayfield MD     Date: 9/9/2019  Time: 1:54 PM

## 2019-09-11 ENCOUNTER — RESULTS ENCOUNTER (OUTPATIENT)
Dept: ONCOLOGY | Facility: CLINIC | Age: 59
End: 2019-09-11

## 2019-09-11 ENCOUNTER — OFFICE VISIT (OUTPATIENT)
Dept: ONCOLOGY | Facility: CLINIC | Age: 59
End: 2019-09-11

## 2019-09-11 ENCOUNTER — APPOINTMENT (OUTPATIENT)
Dept: LAB | Facility: HOSPITAL | Age: 59
End: 2019-09-11

## 2019-09-11 VITALS
DIASTOLIC BLOOD PRESSURE: 79 MMHG | WEIGHT: 110.3 LBS | SYSTOLIC BLOOD PRESSURE: 147 MMHG | TEMPERATURE: 97.4 F | BODY MASS INDEX: 18.38 KG/M2 | HEIGHT: 65 IN | RESPIRATION RATE: 16 BRPM | HEART RATE: 70 BPM

## 2019-09-11 DIAGNOSIS — C50.912 INFILTRATING DUCTAL CARCINOMA OF LEFT BREAST (HCC): ICD-10-CM

## 2019-09-11 DIAGNOSIS — C50.912 INFILTRATING DUCTAL CARCINOMA OF LEFT BREAST (HCC): Primary | ICD-10-CM

## 2019-09-11 DIAGNOSIS — Z79.899 ON ANTINEOPLASTIC CHEMOTHERAPY: ICD-10-CM

## 2019-09-11 LAB
ALBUMIN SERPL-MCNC: 3.8 G/DL (ref 3.5–4.8)
ALBUMIN/GLOB SERPL: 1.3 G/DL (ref 1–1.7)
ALP SERPL-CCNC: 57 U/L (ref 32–91)
ALT SERPL W P-5'-P-CCNC: 21 U/L (ref 14–54)
ANION GAP SERPL CALCULATED.3IONS-SCNC: 11.6 MMOL/L (ref 5–15)
AST SERPL-CCNC: 24 U/L (ref 15–41)
BASOPHILS # BLD AUTO: 0.03 10*3/MM3 (ref 0–0.2)
BASOPHILS NFR BLD AUTO: 0.8 % (ref 0–1.5)
BILIRUB SERPL-MCNC: 0.5 MG/DL (ref 0.3–1.2)
BUN BLD-MCNC: 15 MG/DL (ref 8–20)
BUN/CREAT SERPL: 13.6 (ref 5.4–26.2)
CALCIUM SPEC-SCNC: 9.6 MG/DL (ref 8.9–10.3)
CHLORIDE SERPL-SCNC: 104 MMOL/L (ref 101–111)
CO2 SERPL-SCNC: 29 MMOL/L (ref 22–32)
CREAT BLD-MCNC: 1.1 MG/DL (ref 0.4–1)
DEPRECATED RDW RBC AUTO: 48.2 FL (ref 37–54)
EOSINOPHIL # BLD AUTO: 0.32 10*3/MM3 (ref 0–0.4)
EOSINOPHIL NFR BLD AUTO: 8 % (ref 0.3–6.2)
ERYTHROCYTE [DISTWIDTH] IN BLOOD BY AUTOMATED COUNT: 13.5 % (ref 12.3–15.4)
GFR SERPL CREATININE-BSD FRML MDRD: 51 ML/MIN/1.73
GLOBULIN UR ELPH-MCNC: 2.9 GM/DL (ref 2.5–3.8)
GLUCOSE BLD-MCNC: 96 MG/DL (ref 65–99)
HCT VFR BLD AUTO: 40.2 % (ref 34–46.6)
HGB BLD-MCNC: 13.2 G/DL (ref 12–15.9)
LYMPHOCYTES # BLD AUTO: 1.64 10*3/MM3 (ref 0.7–3.1)
LYMPHOCYTES NFR BLD AUTO: 41 % (ref 19.6–45.3)
MCH RBC QN AUTO: 32.8 PG (ref 26.6–33)
MCHC RBC AUTO-ENTMCNC: 32.8 G/DL (ref 31.5–35.7)
MCV RBC AUTO: 100 FL (ref 79–97)
MONOCYTES # BLD AUTO: 0.3 10*3/MM3 (ref 0.1–0.9)
MONOCYTES NFR BLD AUTO: 7.5 % (ref 5–12)
NEUTROPHILS # BLD AUTO: 1.71 10*3/MM3 (ref 1.7–7)
NEUTROPHILS NFR BLD AUTO: 42.7 % (ref 42.7–76)
PLATELET # BLD AUTO: 150 10*3/MM3 (ref 140–450)
PMV BLD AUTO: 9.6 FL (ref 6–12)
POTASSIUM BLD-SCNC: 4.6 MMOL/L (ref 3.6–5.1)
PROT SERPL-MCNC: 6.7 G/DL (ref 6.1–7.9)
RBC # BLD AUTO: 4.02 10*6/MM3 (ref 3.77–5.28)
SODIUM BLD-SCNC: 140 MMOL/L (ref 136–144)
WBC NRBC COR # BLD: 4 10*3/MM3 (ref 3.4–10.8)

## 2019-09-11 PROCEDURE — 99215 OFFICE O/P EST HI 40 MIN: CPT | Performed by: INTERNAL MEDICINE

## 2019-09-11 PROCEDURE — 80053 COMPREHEN METABOLIC PANEL: CPT | Performed by: NURSE PRACTITIONER

## 2019-09-11 PROCEDURE — 85025 COMPLETE CBC W/AUTO DIFF WBC: CPT | Performed by: INTERNAL MEDICINE

## 2019-09-11 NOTE — PROGRESS NOTES
Hematology/Oncology Outpatient Follow Up    Lisa Magallanes  1960    Primary Care Physician: Tin Eli MD  Referring Physician: Tin Eli MD    Chief complaint:  Left breast pT1b, pN0, Mx infiltrating ductal carcinoma, ER negative, CO negative and HER-2/martha positive.   History of Present Illness:   Ms. Magallanes reports her sister was diagnosed with breast cancer in   2017.  Patient never had any mammograms done on herself and had her initial one done in November 2018.   · 11/6/18 - Bilateral screening mammogram showed left breast global asymmetry within the upper   half of the left breast and right breast focal asymmetry within the lower inner quadrant.  This   followed up with initial biopsy of the right breast mass.    · 12/12/18 - CT-guided biopsy of the right breast mass at 4 o’clock, which is 4 to 5 cm from the   nipple, were fragments of hyalinized fibroadenoma, negative for carcinoma in situ and invasive   carcinoma.    · 12/12/18 - On the left breast stereotactic biopsy of the calcifications, benign fibrofatty   breast tissue.  No malignancy.  Another biopsy, specimen 2, of calcifications was positive for   DCIS, high nuclear grade, solid type with central necrosis.  No invasive malignancy was   identified.  Tumor was ER negative, CO negative.  · 1/29/19 - MRI of the breast bilaterally showed 7 x 6 mm medial to the biopsy clip of the left   breast at the biopsy proven DCIS.  Could be residual malignancy near the post biopsy change at   the site of recently diagnosed DCIS in the left breast.  An 11 mm mass concerning for invasive   malignancy and three areas of focal non-mass enhancement in the upper outer and lower outer   quadrants of the left breast.  No MR signs of malignancy in the right breast.    · 2/5/19 - MRI biopsy of the left breast mass at 6 o'clock position, invasive poorly   differentiated mammary carcinoma with [place] grade of 8/9.  No in situ carcinoma was    identified.  Tumor ER negative, RI negative and HER-2/martha testing was positive.   · Patient was sent to the [facility] and seen initially on 19.   · 19 - Left breast mastectomy, invasive poorly differentiated ductal carcinoma, 8 mm, pT1b   with associated high grade DCIS.  Margins negative.  One benign sentinel lymph node.        [place] score.  DCIS present 7 mm.  Tumor ER negative, RI negative, HER-2/martha positive.    · 3/18/19 - Adjuvant chemotherapy with Taxotere, Carboplatin and Herceptin was initiated.    · 3/20/19 - PET/CT scan with no evidence of metastatic disease within the neck, chest, abdomen or   pelvis.  There is cholelithiasis.    7/10/2019 patient completed 6 cycles of adjuvant TCH    2019 patient initiated on single agent Herceptin     Past Medical History:   Diagnosis Date   • Breast cancer (CMS/HCC)     left   • Cytopenia     Chemo-induced cytopenia   • Fatigue     resolved   • GERD (gastroesophageal reflux disease)        Past Surgical History:   Procedure Laterality Date   •  SECTION     •  SECTION     • MASTECTOMY Left 2019   • POSTPARTUM TUBAL LIGATION     • TUBAL ABDOMINAL LIGATION     • VEIN LIGATION     • VENOUS ACCESS DEVICE (PORT) INSERTION Right 2019   • VENOUS ACCESS DEVICE (PORT) REMOVAL Right 2019    Procedure: REMOVAL VENOUS ACCESS DEVICE;  Surgeon: Levy Mayfield MD;  Location: [facility] OR;  Service: General         Current Outpatient Medications:   •  Acetaminophen 325 MG capsule, TYLENOL CAPS prn, Disp: , Rfl:   •  ALPRAZolam (XANAX) 0.25 MG tablet, Take 1 tablet by mouth Daily. (Patient taking differently: Take 0.25 mg by mouth Every Night.), Disp: 30 tablet, Rfl: 3  •  cetirizine (zyrTEC) 10 MG tablet, Take 10 mg by mouth Daily., Disp: , Rfl:   •  raNITIdine (ZANTAC) 150 MG tablet, Take 150 mg by mouth Every Night., Disp: , Rfl:   •  Trastuzumab (HERCEPTIN IV), Infuse 1 dose into a venous catheter Every 30  (Thirty) Days. Takes every 3 weeks, Disp: , Rfl:     Allergies   Allergen Reactions   • Morphine Palpitations       Family History   Problem Relation Age of Onset   • Breast cancer Sister 58       Cancer-related family history includes Breast cancer (age of onset: 58) in her sister.    Social History     Tobacco Use   • Smoking status: Light Tobacco Smoker     Packs/day: 0.00     Years: 10.00     Pack years: 0.00     Types: Cigarettes     Start date: 2009   • Smokeless tobacco: Never Used   • Tobacco comment: Attempting to quit  1-2 cigarettes per day, sometimes none   Substance Use Topics   • Alcohol use: No     Frequency: Never   • Drug use: No       I have reviewed the history of present illness, past medical history, family history, social history, lab results, all notes and other records since the patient was last seen on 7/8/2019    SUBJECTIVE:      Patient in my office for follow-up during chemotherapy.  Patient is receiving single agent Herceptin now.  Tolerating it well.  Her hair is coming back she is happy about it.  Anxiety is controlled with the Xanax.  ROS:       Review of Systems   Constitutional: Negative for fever.   HENT: Negative for nosebleeds and trouble swallowing.    Eyes: Negative for visual disturbance.   Respiratory: Negative for cough, shortness of breath and wheezing.    Cardiovascular: Negative for chest pain.   Gastrointestinal: Negative for abdominal pain and blood in stool.   Endocrine: Negative for cold intolerance.   Genitourinary: Negative for dysuria and hematuria.   Musculoskeletal: Negative for joint swelling.   Skin: Negative for rash.   Allergic/Immunologic: Negative for environmental allergies.   Neurological: Negative for seizures.   Hematological: Does not bruise/bleed easily.   Psychiatric/Behavioral: The patient is not nervous/anxious.          Objective:       Vitals:    09/11/19 1115   BP: 147/79   Pulse: 70   Resp: 16   Temp: 97.4 °F (36.3 °C)   Weight: 50 kg (110 lb  "4.8 oz)   Height: 165.1 cm (65\")   PainSc: 0-No pain         PHYSICAL EXAM:       Physical Exam   Constitutional: She is oriented to person, place, and time. No distress.   HENT:   Head: Normocephalic and atraumatic.   Eyes: Conjunctivae and EOM are normal. Right eye exhibits no discharge. Left eye exhibits no discharge. No scleral icterus.   Neck: Normal range of motion. Neck supple. No thyromegaly present.   Cardiovascular: Normal rate, regular rhythm and normal heart sounds. Exam reveals no gallop and no friction rub.   Pulmonary/Chest: Effort normal. No stridor. No respiratory distress. She has no wheezes.   Abdominal: Soft. Bowel sounds are normal. She exhibits no mass. There is no tenderness. There is no rebound and no guarding.   Musculoskeletal: Normal range of motion. She exhibits no tenderness.   Lymphadenopathy:     She has no cervical adenopathy.   Neurological: She is alert and oriented to person, place, and time. She exhibits normal muscle tone.   Skin: Skin is warm. No rash noted. She is not diaphoretic. No erythema.   Psychiatric: She has a normal mood and affect. Her behavior is normal.   Nursing note and vitals reviewed.       RECENT LABS:     WBC   Date Value Ref Range Status   09/11/2019 4.00 3.40 - 10.80 10*3/mm3 Final     RBC   Date Value Ref Range Status   09/11/2019 4.02 3.77 - 5.28 10*6/mm3 Final   10/29/2018 4.82 3.80 - 5.10 Million/uL Final     Hemoglobin   Date Value Ref Range Status   09/11/2019 13.2 12.0 - 15.9 g/dL Final     Hematocrit   Date Value Ref Range Status   09/11/2019 40.2 34.0 - 46.6 % Final     MCV   Date Value Ref Range Status   09/11/2019 100.0 (H) 79.0 - 97.0 fL Final     MCH   Date Value Ref Range Status   09/11/2019 32.8 26.6 - 33.0 pg Final     MCHC   Date Value Ref Range Status   09/11/2019 32.8 31.5 - 35.7 g/dL Final     RDW   Date Value Ref Range Status   09/11/2019 13.5 12.3 - 15.4 % Final     RDW-SD   Date Value Ref Range Status   09/11/2019 48.2 37.0 - 54.0 fl " Final     MPV   Date Value Ref Range Status   09/11/2019 9.6 6.0 - 12.0 fL Final     Platelets   Date Value Ref Range Status   09/11/2019 150 140 - 450 10*3/mm3 Final     Neutrophil %   Date Value Ref Range Status   09/11/2019 42.7 42.7 - 76.0 % Final     Lymphocyte %   Date Value Ref Range Status   09/11/2019 41.0 19.6 - 45.3 % Final     Monocyte %   Date Value Ref Range Status   09/11/2019 7.5 5.0 - 12.0 % Final     Eosinophil %   Date Value Ref Range Status   09/11/2019 8.0 (H) 0.3 - 6.2 % Final     Basophil %   Date Value Ref Range Status   09/11/2019 0.8 0.0 - 1.5 % Final     Neutrophils, Absolute   Date Value Ref Range Status   09/11/2019 1.71 1.70 - 7.00 10*3/mm3 Final     Lymphocytes, Absolute   Date Value Ref Range Status   09/11/2019 1.64 0.70 - 3.10 10*3/mm3 Final     Monocytes, Absolute   Date Value Ref Range Status   09/11/2019 0.30 0.10 - 0.90 10*3/mm3 Final     Eosinophils, Absolute   Date Value Ref Range Status   09/11/2019 0.32 0.00 - 0.40 10*3/mm3 Final     Basophils, Absolute   Date Value Ref Range Status   09/11/2019 0.03 0.00 - 0.20 10*3/mm3 Final     nRBC   Date Value Ref Range Status   03/05/2019 0 0 /100[WBCs] Final       Lab Results   Component Value Date    GLUCOSE 95 08/14/2019    BUN 8 08/14/2019    CREATININE 0.90 08/14/2019    EGFRIFNONA 65 08/14/2019    EGFRIFAFRI >60 06/10/2019    BCR 8.9 08/14/2019    K 4.4 08/14/2019    CO2 25.0 08/14/2019    CALCIUM 9.5 08/14/2019    ALBUMIN 3.60 08/14/2019    LABIL2 1.3 06/10/2019    AST 24 08/14/2019    ALT 18 08/14/2019         Assessment/Plan      ASSESSMENT:     1. Left breast pT1b, pN0, Mx infiltrating ductal carcinoma, ER negative, NH negative and HER-2/martha positive.   2. Chemo induced anemia  3. Anxiety  4. ECOG 1      PLAN:      1. Reviewed the laboratory work-up with the patient.  Hemoglobin is improved after stopping the chemotherapy.    2. Chemo induced anemia has resolved  3. Continue to be active encourage the patient.  4. Check CBC  with chemo check CA-27-29.  5. Patient completed combination chemotherapy.  She is now on single agent Herceptin.  Continued for a total of 1 year.  I will obtain 2D echo in about 3 weeks time given that the patient is on Herceptin..  6. Xanax will be continued at the reduced dose of 0.25 mg a day   7. I will see her back in my office in 2 months recheck CBC CMP and CA-27-29 at that time    I have reviewed labs results, imaging, vitals, and medications with the patient today.         Patient verbalized understanding and is in agreement of the above plan.          This report was compiled using Dragon voice recognition software. I have made every effort to proof read this document; however, typographical errors may persist.

## 2019-09-12 ENCOUNTER — TELEPHONE (OUTPATIENT)
Dept: ONCOLOGY | Facility: CLINIC | Age: 59
End: 2019-09-12

## 2019-09-12 NOTE — TELEPHONE ENCOUNTER
Patient states her heart rate dropped to 43 yesterday at work.  She states she was working outside in the heat.  She states she was lightheaded.  She also broke out in a sweat, which is rare for her.  She states she went inside where it is air conditioned and laid her head on the desk.  She states the heart rate finally came up when she started walking.  She is wanting to know if she can take her Xanax tonight.  Patient with left breast CA on chemo.  Saw Dr. Burrell on 8-14-19. tony Ramireza

## 2019-09-12 NOTE — TELEPHONE ENCOUNTER
Called patient and left message to return call.  She should not take Xanax until we can confirm her heart rate is stable.  If the patient has symptoms that appear again with bradycardia then she should go to the emergency room.  Echocardiogram had previously been ordered and is pending scheduling.

## 2019-09-17 DIAGNOSIS — C50.912 INFILTRATING DUCTAL CARCINOMA OF LEFT BREAST (HCC): ICD-10-CM

## 2019-09-17 RX ORDER — SODIUM CHLORIDE 9 MG/ML
250 INJECTION, SOLUTION INTRAVENOUS ONCE
Status: CANCELLED | OUTPATIENT
Start: 2019-09-18

## 2019-09-18 ENCOUNTER — HOSPITAL ENCOUNTER (OUTPATIENT)
Dept: ONCOLOGY | Facility: HOSPITAL | Age: 59
Setting detail: INFUSION SERIES
Discharge: HOME | End: 2019-09-18

## 2019-09-18 ENCOUNTER — TELEPHONE (OUTPATIENT)
Dept: ONCOLOGY | Facility: CLINIC | Age: 59
End: 2019-09-18

## 2019-09-18 VITALS
DIASTOLIC BLOOD PRESSURE: 82 MMHG | HEIGHT: 65 IN | RESPIRATION RATE: 18 BRPM | SYSTOLIC BLOOD PRESSURE: 148 MMHG | HEART RATE: 86 BPM | WEIGHT: 109 LBS | BODY MASS INDEX: 18.16 KG/M2 | TEMPERATURE: 98.1 F

## 2019-09-18 DIAGNOSIS — C50.912 INFILTRATING DUCTAL CARCINOMA OF LEFT BREAST (HCC): Primary | ICD-10-CM

## 2019-09-18 LAB
BASOPHILS # BLD AUTO: 0.02 10*3/MM3 (ref 0–0.2)
BASOPHILS NFR BLD AUTO: 0.4 % (ref 0–1.5)
DEPRECATED RDW RBC AUTO: 48.4 FL (ref 37–54)
EOSINOPHIL # BLD AUTO: 0.36 10*3/MM3 (ref 0–0.4)
EOSINOPHIL NFR BLD AUTO: 7.1 % (ref 0.3–6.2)
ERYTHROCYTE [DISTWIDTH] IN BLOOD BY AUTOMATED COUNT: 13.7 % (ref 12.3–15.4)
HCT VFR BLD AUTO: 38 % (ref 34–46.6)
HGB BLD-MCNC: 12.5 G/DL (ref 12–15.9)
LYMPHOCYTES # BLD AUTO: 2.04 10*3/MM3 (ref 0.7–3.1)
LYMPHOCYTES NFR BLD AUTO: 40.1 % (ref 19.6–45.3)
MCH RBC QN AUTO: 32.8 PG (ref 26.6–33)
MCHC RBC AUTO-ENTMCNC: 32.9 G/DL (ref 31.5–35.7)
MCV RBC AUTO: 99.7 FL (ref 79–97)
MONOCYTES # BLD AUTO: 0.42 10*3/MM3 (ref 0.1–0.9)
MONOCYTES NFR BLD AUTO: 8.3 % (ref 5–12)
NEUTROPHILS # BLD AUTO: 2.25 10*3/MM3 (ref 1.7–7)
NEUTROPHILS NFR BLD AUTO: 44.1 % (ref 42.7–76)
PLATELET # BLD AUTO: 157 10*3/MM3 (ref 140–450)
PMV BLD AUTO: 9.9 FL (ref 6–12)
RBC # BLD AUTO: 3.81 10*6/MM3 (ref 3.77–5.28)
WBC NRBC COR # BLD: 5.09 10*3/MM3 (ref 3.4–10.8)

## 2019-09-18 PROCEDURE — 85025 COMPLETE CBC W/AUTO DIFF WBC: CPT | Performed by: NURSE PRACTITIONER

## 2019-09-18 PROCEDURE — 25010000002 TRASTUZUMAB PER 10 MG: Performed by: NURSE PRACTITIONER

## 2019-09-18 PROCEDURE — 36415 COLL VENOUS BLD VENIPUNCTURE: CPT | Performed by: INTERNAL MEDICINE

## 2019-09-18 PROCEDURE — 96413 CHEMO IV INFUSION 1 HR: CPT | Performed by: INTERNAL MEDICINE

## 2019-09-18 RX ADMIN — TRASTUZUMAB 300 MG: 150 INJECTION, POWDER, LYOPHILIZED, FOR SOLUTION INTRAVENOUS at 11:00

## 2019-09-18 NOTE — TELEPHONE ENCOUNTER
Benefit Review.  Patient has active [place] coverage.  She is eligible for copay cards but has met her 2019 ded/oop.

## 2019-09-30 ENCOUNTER — OFFICE VISIT (OUTPATIENT)
Dept: FAMILY MEDICINE CLINIC | Facility: CLINIC | Age: 59
End: 2019-09-30

## 2019-09-30 VITALS
HEIGHT: 65 IN | WEIGHT: 109.2 LBS | TEMPERATURE: 97.7 F | SYSTOLIC BLOOD PRESSURE: 138 MMHG | RESPIRATION RATE: 16 BRPM | BODY MASS INDEX: 18.19 KG/M2 | HEART RATE: 99 BPM | OXYGEN SATURATION: 95 % | DIASTOLIC BLOOD PRESSURE: 90 MMHG

## 2019-09-30 DIAGNOSIS — E78.2 MIXED HYPERLIPIDEMIA: ICD-10-CM

## 2019-09-30 DIAGNOSIS — Z72.0 TOBACCO USE: ICD-10-CM

## 2019-09-30 DIAGNOSIS — R63.6 UNDERWEIGHT: ICD-10-CM

## 2019-09-30 DIAGNOSIS — R30.0 DYSURIA: ICD-10-CM

## 2019-09-30 DIAGNOSIS — Z00.01 ANNUAL VISIT FOR GENERAL ADULT MEDICAL EXAMINATION WITH ABNORMAL FINDINGS: Primary | ICD-10-CM

## 2019-09-30 DIAGNOSIS — C50.912 INFILTRATING DUCTAL CARCINOMA OF LEFT BREAST (HCC): ICD-10-CM

## 2019-09-30 DIAGNOSIS — D05.12 DUCTAL CARCINOMA IN SITU (DCIS) OF LEFT BREAST: ICD-10-CM

## 2019-09-30 LAB
BILIRUB BLD-MCNC: NEGATIVE MG/DL
CLARITY, POC: CLEAR
COLOR UR: YELLOW
GLUCOSE UR STRIP-MCNC: NEGATIVE MG/DL
KETONES UR QL: ABNORMAL
LEUKOCYTE EST, POC: ABNORMAL
NITRITE UR-MCNC: NEGATIVE MG/ML
PH UR: 5 [PH] (ref 5–8)
PROT UR STRIP-MCNC: NEGATIVE MG/DL
RBC # UR STRIP: NEGATIVE /UL
SP GR UR: 1.01 (ref 1–1.03)
UROBILINOGEN UR QL: NORMAL

## 2019-09-30 PROCEDURE — 99396 PREV VISIT EST AGE 40-64: CPT | Performed by: FAMILY MEDICINE

## 2019-09-30 PROCEDURE — 81002 URINALYSIS NONAUTO W/O SCOPE: CPT | Performed by: FAMILY MEDICINE

## 2019-10-02 LAB
BACTERIA UR CULT: NO GROWTH
BACTERIA UR CULT: NORMAL

## 2019-10-07 ENCOUNTER — HOSPITAL ENCOUNTER (OUTPATIENT)
Dept: CARDIOLOGY | Facility: HOSPITAL | Age: 59
Discharge: HOME | End: 2019-10-07
Admitting: NURSE PRACTITIONER

## 2019-10-07 VITALS
DIASTOLIC BLOOD PRESSURE: 69 MMHG | BODY MASS INDEX: 18.16 KG/M2 | WEIGHT: 109 LBS | SYSTOLIC BLOOD PRESSURE: 119 MMHG | HEIGHT: 65 IN

## 2019-10-07 DIAGNOSIS — Z79.899 ON ANTINEOPLASTIC CHEMOTHERAPY: ICD-10-CM

## 2019-10-07 DIAGNOSIS — C50.912 INFILTRATING DUCTAL CARCINOMA OF LEFT BREAST (HCC): ICD-10-CM

## 2019-10-07 LAB
BH CV ECHO MEAS - EF(MOD-BP): 58 %
BH CV ECHO MEAS - LA DIMENSION(2D): 2.5 CM
MAXIMAL PREDICTED HEART RATE: 163 BPM
STRESS TARGET HR: 139 BPM

## 2019-10-07 PROCEDURE — 93306 TTE W/DOPPLER COMPLETE: CPT

## 2019-10-07 PROCEDURE — 93306 TTE W/DOPPLER COMPLETE: CPT | Performed by: INTERNAL MEDICINE

## 2019-10-08 DIAGNOSIS — C50.912 INFILTRATING DUCTAL CARCINOMA OF LEFT BREAST (HCC): ICD-10-CM

## 2019-10-08 RX ORDER — SODIUM CHLORIDE 9 MG/ML
250 INJECTION, SOLUTION INTRAVENOUS ONCE
Status: CANCELLED | OUTPATIENT
Start: 2019-10-09

## 2019-10-09 ENCOUNTER — DOCUMENTATION (OUTPATIENT)
Dept: ONCOLOGY | Facility: HOSPITAL | Age: 59
End: 2019-10-09

## 2019-10-09 ENCOUNTER — APPOINTMENT (OUTPATIENT)
Dept: ONCOLOGY | Facility: HOSPITAL | Age: 59
End: 2019-10-09

## 2019-10-09 ENCOUNTER — HOSPITAL ENCOUNTER (OUTPATIENT)
Dept: ONCOLOGY | Facility: HOSPITAL | Age: 59
Setting detail: INFUSION SERIES
Discharge: HOME | End: 2019-10-09

## 2019-10-09 VITALS
SYSTOLIC BLOOD PRESSURE: 135 MMHG | WEIGHT: 108 LBS | TEMPERATURE: 97.8 F | HEART RATE: 76 BPM | HEIGHT: 67 IN | DIASTOLIC BLOOD PRESSURE: 85 MMHG | BODY MASS INDEX: 16.95 KG/M2 | RESPIRATION RATE: 16 BRPM

## 2019-10-09 DIAGNOSIS — C50.912 INFILTRATING DUCTAL CARCINOMA OF LEFT BREAST (HCC): Primary | ICD-10-CM

## 2019-10-09 LAB
BASOPHILS # BLD AUTO: 0.02 10*3/MM3 (ref 0–0.2)
BASOPHILS NFR BLD AUTO: 0.4 % (ref 0–1.5)
DEPRECATED RDW RBC AUTO: 48.1 FL (ref 37–54)
EOSINOPHIL # BLD AUTO: 0.26 10*3/MM3 (ref 0–0.4)
EOSINOPHIL NFR BLD AUTO: 5 % (ref 0.3–6.2)
ERYTHROCYTE [DISTWIDTH] IN BLOOD BY AUTOMATED COUNT: 14 % (ref 12.3–15.4)
HCT VFR BLD AUTO: 41.5 % (ref 34–46.6)
HGB BLD-MCNC: 14 G/DL (ref 12–15.9)
LYMPHOCYTES # BLD AUTO: 2.07 10*3/MM3 (ref 0.7–3.1)
LYMPHOCYTES NFR BLD AUTO: 39.9 % (ref 19.6–45.3)
MCH RBC QN AUTO: 32.7 PG (ref 26.6–33)
MCHC RBC AUTO-ENTMCNC: 33.7 G/DL (ref 31.5–35.7)
MCV RBC AUTO: 97 FL (ref 79–97)
MONOCYTES # BLD AUTO: 0.42 10*3/MM3 (ref 0.1–0.9)
MONOCYTES NFR BLD AUTO: 8.1 % (ref 5–12)
NEUTROPHILS # BLD AUTO: 2.42 10*3/MM3 (ref 1.7–7)
NEUTROPHILS NFR BLD AUTO: 46.6 % (ref 42.7–76)
PLATELET # BLD AUTO: 153 10*3/MM3 (ref 140–450)
PMV BLD AUTO: 10.2 FL (ref 6–12)
RBC # BLD AUTO: 4.28 10*6/MM3 (ref 3.77–5.28)
WBC NRBC COR # BLD: 5.19 10*3/MM3 (ref 3.4–10.8)

## 2019-10-09 PROCEDURE — 85025 COMPLETE CBC W/AUTO DIFF WBC: CPT | Performed by: NURSE PRACTITIONER

## 2019-10-09 PROCEDURE — 96413 CHEMO IV INFUSION 1 HR: CPT | Performed by: INTERNAL MEDICINE

## 2019-10-09 PROCEDURE — 25010000002 TRASTUZUMAB PER 10 MG: Performed by: NURSE PRACTITIONER

## 2019-10-09 PROCEDURE — 36415 COLL VENOUS BLD VENIPUNCTURE: CPT | Performed by: INTERNAL MEDICINE

## 2019-10-09 RX ORDER — FAMOTIDINE 20 MG/1
20 TABLET, FILM COATED ORAL DAILY
COMMUNITY
End: 2020-02-12

## 2019-10-09 RX ADMIN — TRASTUZUMAB 300 MG: 150 INJECTION, POWDER, LYOPHILIZED, FOR SOLUTION INTRAVENOUS at 11:40

## 2019-10-09 NOTE — PROGRESS NOTES
"                 Nutrition Assessment  Lisa Magallanes    Anthropometrics  Height: 5'7\"  Weight: 108#  BMI: 16.9  Weight Hx:   (2/23/16) 119.4#  (9/5/17) 114.8#  (3/30/18) 125.2#  (10/29/18) 113.5#  (1/18/19) 112.8#  (6/13/19) 115#  (8/7/19) 111.9#  (9/18/19) 108.9#  IBW: 135# (80%)  UBW: 125# (86.4%)    Nutrition Questionnaire    Food Allergies: Pt denied allergies at this time    Chewing/Swallowing Problems: Pt denied chewing and swallowing problems at this time.    Who does the cooking & shopping: Pt does, she lives with her .    Nausea/Vomiting/Diarrhea: Pt denied n/v/d    Appetite: \"Same as it's always been.\" Pt said she's never been one to eat large meals, and prefers snacking/grazing throughout the day.    24-Hour Diet Recall:   11:30 AM - toasted cinnamon-raisin bread with butter x2, coffee with cream and sugar x 2  3:00 PM - snack size [place] candy bar and Kit Emily  5:00 PM - regular Greek yogurt, small honey bun, diet Pepsi  7:00 PM - roasted chicken and swiss sandwich, handful of cheetos, water  9:30 PM - snack size Baby Carolyn x2    Discussion: Met with pt to address recent weight loss. Pt said her eating habits and preferences have not changed since she's started treatment. She said she thinks she's consuming around the same number of calories as she did before her diagnosis. Based on the pt's diet recall, I informed her she's like not consuming an adequate amount of calories to prevent weight loss and/or promote health, pt verbalized understanding. Pt said she struggles to use calorie-dense drinks or milk shakes because they fill her up so much that she won't eat more several hours, causing her to eat less calories overall. We discussed ways she can increase calories while avoiding her feeling overfull. I recommended she add a peanut butter sandwich to her 3:00 o'clock snack, add calorie-dense granola to her daily yogurt, and add a handful of trail mix to her evening snack. I informed her " that these small changes could easily add an additional 600 calories per day and protein, pt verbalized understanding and said she would try to make the changes. Pt was pleasant and appreciated today's visit. All questions and concerns were addressed and answered. I provided my contact information and encouraged her to call or schedule an appointment as needed.    Elsie Hutton, MS,RD,LD-[place],CD-[place]  Registered Dietitian

## 2019-10-09 NOTE — PROGRESS NOTES
Pt here for Herceptin and reported that she had been told not to take her Xanax until after echocardiogram on 10/7/19 because she had HR of 46 at work once.   Reviewed with Dr Burrell and pt to continue Xanax as needed.  Pt states Zantac has been removed from pt current pharmacy due to recall.   Reviewed with Dr Burrell and pt to begin OTC Pepcid daily.   Reviewed above with pt and v/u and agreement.

## 2019-10-28 DIAGNOSIS — C50.912 INFILTRATING DUCTAL CARCINOMA OF LEFT BREAST (HCC): ICD-10-CM

## 2019-10-28 RX ORDER — SODIUM CHLORIDE 9 MG/ML
250 INJECTION, SOLUTION INTRAVENOUS ONCE
Status: CANCELLED | OUTPATIENT
Start: 2019-10-31

## 2019-10-30 ENCOUNTER — HOSPITAL ENCOUNTER (OUTPATIENT)
Dept: ONCOLOGY | Facility: HOSPITAL | Age: 59
Setting detail: INFUSION SERIES
End: 2019-10-30

## 2019-10-31 ENCOUNTER — HOSPITAL ENCOUNTER (OUTPATIENT)
Dept: ONCOLOGY | Facility: HOSPITAL | Age: 59
Setting detail: INFUSION SERIES
Discharge: HOME | End: 2019-10-31

## 2019-10-31 VITALS
RESPIRATION RATE: 18 BRPM | BODY MASS INDEX: 18.33 KG/M2 | SYSTOLIC BLOOD PRESSURE: 108 MMHG | HEIGHT: 65 IN | HEART RATE: 68 BPM | DIASTOLIC BLOOD PRESSURE: 68 MMHG | TEMPERATURE: 97.9 F | WEIGHT: 110 LBS

## 2019-10-31 DIAGNOSIS — C50.912 INFILTRATING DUCTAL CARCINOMA OF LEFT BREAST (HCC): Primary | ICD-10-CM

## 2019-10-31 LAB
BASOPHILS # BLD AUTO: 0.02 10*3/MM3 (ref 0–0.2)
BASOPHILS NFR BLD AUTO: 0.3 % (ref 0–1.5)
DEPRECATED RDW RBC AUTO: 47.2 FL (ref 37–54)
EOSINOPHIL # BLD AUTO: 0.25 10*3/MM3 (ref 0–0.4)
EOSINOPHIL NFR BLD AUTO: 3.6 % (ref 0.3–6.2)
ERYTHROCYTE [DISTWIDTH] IN BLOOD BY AUTOMATED COUNT: 13.7 % (ref 12.3–15.4)
HCT VFR BLD AUTO: 40.2 % (ref 34–46.6)
HGB BLD-MCNC: 13.6 G/DL (ref 12–15.9)
LYMPHOCYTES # BLD AUTO: 1.9 10*3/MM3 (ref 0.7–3.1)
LYMPHOCYTES NFR BLD AUTO: 27.1 % (ref 19.6–45.3)
MCH RBC QN AUTO: 32.6 PG (ref 26.6–33)
MCHC RBC AUTO-ENTMCNC: 33.8 G/DL (ref 31.5–35.7)
MCV RBC AUTO: 96.4 FL (ref 79–97)
MONOCYTES # BLD AUTO: 0.51 10*3/MM3 (ref 0.1–0.9)
MONOCYTES NFR BLD AUTO: 7.3 % (ref 5–12)
NEUTROPHILS # BLD AUTO: 4.32 10*3/MM3 (ref 1.7–7)
NEUTROPHILS NFR BLD AUTO: 61.7 % (ref 42.7–76)
PLATELET # BLD AUTO: 157 10*3/MM3 (ref 140–450)
PMV BLD AUTO: 10.1 FL (ref 6–12)
RBC # BLD AUTO: 4.17 10*6/MM3 (ref 3.77–5.28)
WBC NRBC COR # BLD: 7 10*3/MM3 (ref 3.4–10.8)

## 2019-10-31 PROCEDURE — 96413 CHEMO IV INFUSION 1 HR: CPT | Performed by: INTERNAL MEDICINE

## 2019-10-31 PROCEDURE — 36415 COLL VENOUS BLD VENIPUNCTURE: CPT | Performed by: INTERNAL MEDICINE

## 2019-10-31 PROCEDURE — 25010000002 TRASTUZUMAB PER 10 MG: Performed by: INTERNAL MEDICINE

## 2019-10-31 PROCEDURE — 85025 COMPLETE CBC W/AUTO DIFF WBC: CPT | Performed by: NURSE PRACTITIONER

## 2019-10-31 RX ORDER — SODIUM CHLORIDE 9 MG/ML
250 INJECTION, SOLUTION INTRAVENOUS ONCE
Status: DISCONTINUED | OUTPATIENT
Start: 2019-10-31 | End: 2019-11-01 | Stop reason: HOSPADM

## 2019-10-31 RX ORDER — SODIUM CHLORIDE 0.9 % (FLUSH) 0.9 %
10 SYRINGE (ML) INJECTION AS NEEDED
Status: CANCELLED | OUTPATIENT
Start: 2019-10-31

## 2019-10-31 RX ORDER — SODIUM CHLORIDE 0.9 % (FLUSH) 0.9 %
10 SYRINGE (ML) INJECTION AS NEEDED
Status: DISCONTINUED | OUTPATIENT
Start: 2019-10-31 | End: 2019-11-01 | Stop reason: HOSPADM

## 2019-10-31 RX ORDER — HEPARIN SODIUM (PORCINE) LOCK FLUSH IV SOLN 100 UNIT/ML 100 UNIT/ML
500 SOLUTION INTRAVENOUS AS NEEDED
Status: CANCELLED | OUTPATIENT
Start: 2019-10-31

## 2019-10-31 RX ADMIN — Medication 10 ML: at 12:34

## 2019-10-31 RX ADMIN — TRASTUZUMAB 300 MG: 150 INJECTION, POWDER, LYOPHILIZED, FOR SOLUTION INTRAVENOUS at 11:58

## 2019-11-12 ENCOUNTER — RESULTS ENCOUNTER (OUTPATIENT)
Dept: ONCOLOGY | Facility: CLINIC | Age: 59
End: 2019-11-12

## 2019-11-12 ENCOUNTER — TREATMENT (OUTPATIENT)
Dept: ONCOLOGY | Facility: CLINIC | Age: 59
End: 2019-11-12

## 2019-11-12 DIAGNOSIS — C50.919 INFILTRATING DUCTAL CARCINOMA OF BREAST, UNSPECIFIED LATERALITY (HCC): ICD-10-CM

## 2019-11-13 ENCOUNTER — APPOINTMENT (OUTPATIENT)
Dept: LAB | Facility: HOSPITAL | Age: 59
End: 2019-11-13

## 2019-11-13 ENCOUNTER — OFFICE VISIT (OUTPATIENT)
Dept: ONCOLOGY | Facility: CLINIC | Age: 59
End: 2019-11-13

## 2019-11-13 VITALS
HEIGHT: 65 IN | SYSTOLIC BLOOD PRESSURE: 123 MMHG | TEMPERATURE: 98.2 F | RESPIRATION RATE: 20 BRPM | BODY MASS INDEX: 18.19 KG/M2 | DIASTOLIC BLOOD PRESSURE: 81 MMHG | WEIGHT: 109.2 LBS | HEART RATE: 105 BPM

## 2019-11-13 DIAGNOSIS — C50.919 INFILTRATING DUCTAL CARCINOMA OF BREAST, UNSPECIFIED LATERALITY (HCC): Primary | ICD-10-CM

## 2019-11-13 LAB
ALBUMIN SERPL-MCNC: 4 G/DL (ref 3.5–5.2)
ALBUMIN/GLOB SERPL: 1.3 G/DL
ALP SERPL-CCNC: 119 U/L (ref 39–117)
ALT SERPL W P-5'-P-CCNC: 61 U/L (ref 1–33)
ANION GAP SERPL CALCULATED.3IONS-SCNC: 10 MMOL/L (ref 5–15)
AST SERPL-CCNC: 50 U/L (ref 1–32)
BASOPHILS # BLD AUTO: 0.01 10*3/MM3 (ref 0–0.2)
BASOPHILS NFR BLD AUTO: 0.1 % (ref 0–1.5)
BILIRUB SERPL-MCNC: 0.3 MG/DL (ref 0.2–1.2)
BUN BLD-MCNC: 8 MG/DL (ref 6–20)
BUN/CREAT SERPL: 10.7 (ref 7–25)
CALCIUM SPEC-SCNC: 9.6 MG/DL (ref 8.6–10.5)
CHLORIDE SERPL-SCNC: 102 MMOL/L (ref 98–107)
CO2 SERPL-SCNC: 30 MMOL/L (ref 22–29)
CREAT BLD-MCNC: 0.75 MG/DL (ref 0.57–1)
DEPRECATED RDW RBC AUTO: 45.8 FL (ref 37–54)
EOSINOPHIL # BLD AUTO: 0.07 10*3/MM3 (ref 0–0.4)
EOSINOPHIL NFR BLD AUTO: 0.9 % (ref 0.3–6.2)
ERYTHROCYTE [DISTWIDTH] IN BLOOD BY AUTOMATED COUNT: 13.4 % (ref 12.3–15.4)
GFR SERPL CREATININE-BSD FRML MDRD: 80 ML/MIN/1.73
GLOBULIN UR ELPH-MCNC: 3 GM/DL
GLUCOSE BLD-MCNC: 130 MG/DL (ref 65–99)
HCT VFR BLD AUTO: 38.9 % (ref 34–46.6)
HGB BLD-MCNC: 13 G/DL (ref 12–15.9)
LYMPHOCYTES # BLD AUTO: 1.47 10*3/MM3 (ref 0.7–3.1)
LYMPHOCYTES NFR BLD AUTO: 18.8 % (ref 19.6–45.3)
MCH RBC QN AUTO: 32.3 PG (ref 26.6–33)
MCHC RBC AUTO-ENTMCNC: 33.4 G/DL (ref 31.5–35.7)
MCV RBC AUTO: 96.5 FL (ref 79–97)
MONOCYTES # BLD AUTO: 0.69 10*3/MM3 (ref 0.1–0.9)
MONOCYTES NFR BLD AUTO: 8.8 % (ref 5–12)
NEUTROPHILS # BLD AUTO: 5.6 10*3/MM3 (ref 1.7–7)
NEUTROPHILS NFR BLD AUTO: 71.4 % (ref 42.7–76)
PLATELET # BLD AUTO: 154 10*3/MM3 (ref 140–450)
PMV BLD AUTO: 9.4 FL (ref 6–12)
POTASSIUM BLD-SCNC: 4.1 MMOL/L (ref 3.5–5.2)
PROT SERPL-MCNC: 7 G/DL (ref 6–8.5)
RBC # BLD AUTO: 4.03 10*6/MM3 (ref 3.77–5.28)
SODIUM BLD-SCNC: 142 MMOL/L (ref 136–145)
WBC NRBC COR # BLD: 7.84 10*3/MM3 (ref 3.4–10.8)

## 2019-11-13 PROCEDURE — 80053 COMPREHEN METABOLIC PANEL: CPT | Performed by: INTERNAL MEDICINE

## 2019-11-13 PROCEDURE — 99215 OFFICE O/P EST HI 40 MIN: CPT | Performed by: INTERNAL MEDICINE

## 2019-11-13 PROCEDURE — 85025 COMPLETE CBC W/AUTO DIFF WBC: CPT | Performed by: INTERNAL MEDICINE

## 2019-11-13 RX ORDER — UBIDECARENONE 75 MG
250 CAPSULE ORAL DAILY
COMMUNITY
End: 2020-09-14

## 2019-11-13 RX ORDER — AZITHROMYCIN 250 MG/1
TABLET, FILM COATED ORAL
Qty: 6 TABLET | Refills: 0 | Status: SHIPPED | OUTPATIENT
Start: 2019-11-13 | End: 2020-01-02

## 2019-11-13 NOTE — PROGRESS NOTES
Hematology/Oncology Outpatient Follow Up    Lisa Magallanes  1960    Primary Care Physician: Tin Eli MD  Referring Physician: Tin Eli MD  Chief complaint:  Left breast pT1b, pN0, Mx infiltrating ductal carcinoma, ER negative, CT negative and HER-2/martha positive.   History of Present Illness:   Ms. Magallanes reports her sister was diagnosed with breast cancer in  2017.  Patient never had any mammograms done on herself and had her initial one done in November 2018.   11/6/18 - Bilateral screening mammogram showed left breast global asymmetry within the upper half of the left breast and right breast focal asymmetry within the lower inner quadrant.This  followed up with initial biopsy of the right breast mass.    12/12/18 - CT-guided biopsy of the right breast mass at 4 o’clock, which is 4 to 5 cm from the  nipple, were fragments of hyalinized fibroadenoma, negative for carcinoma in situ and invasive carcinoma.    12/12/18 - On the left breast stereotactic biopsy of the calcifications, benign fibrofatty breast tissue.  No malignancy.  Another biopsy, specimen 2, of calcifications was positive for DCIS, high nuclear grade, solid type with central necrosis.  No invasive malignancy was identified.  Tumor was ER negative, CT negative.  1/29/19 - MRI of the breast bilaterally showed 7 x 6 mm medial to the biopsy clip of the left breast at the biopsy proven DCIS.  Could be residual malignancy near the post biopsy change at the site of recently diagnosed DCIS in the left breast.  An 11 mm mass concerning for invasive malignancy and three areas of focal non-mass enhancement in the upper outer and lower outer quadrants of the left breast.  No MR signs of malignancy in the right breast.    2/5/19 - MRI biopsy of the left breast mass at 6 o'clock position, invasive poorly  differentiated mammary carcinoma with [place] grade of 8/9.  No in situ carcinoma was identified.  Tumor ER negative, CT  negative and HER-2/martha testing was positive.   Patient was sent to the [facility] and seen initially on 19.   19 - Left breast mastectomy, invasive poorly differentiated ductal carcinoma, 8 mm, pT1b with associated high grade DCIS.  Margins negative. One benign sentinel lymph node.   [place] score.  DCIS present 7 mm. Tumor ER negative, WA negative, HER-2/martha positive.    3/18/19 - Adjuvant chemotherapy with Taxotere, Carboplatin and Herceptin was initiated.    3/20/19 - PET/CT scan with no evidence of metastatic disease within the neck, chest, abdomen or  pelvis.  There is cholelithiasis.    7/10/2019 patient completed 6 cycles of adjuvant TCH   2019 patient initiated on single agent Herceptin   2020 nineteen 2D echo-   · Left ventricular systolic function is normal.  · Mild mitral valve regurgitation is present  · Mild tricuspid valve regurgitation is present.  · LV ejection fraction is about 60%  · No pericardial effusion noted  Past Medical History:   Diagnosis Date   • Abnormal TSH    • Allergic rhinitis    • Annual visit for general adult medical examination with abnormal findings 2019   • Breast cancer (CMS/HCC)     left   • Carpal tunnel syndrome    • Cytopenia     Chemo-induced cytopenia   • Ductal carcinoma in situ (DCIS) of left breast    • Fatigue     resolved   • Fibroadenoma    • GERD (gastroesophageal reflux disease)    • Hyperlipidemia    • Sleep disorder    • Tobacco use 2019   • Underweight 2019       Past Surgical History:   Procedure Laterality Date   •  SECTION     •  SECTION     • MASTECTOMY Left 2019   • POSTPARTUM TUBAL LIGATION     • TUBAL ABDOMINAL LIGATION     • VEIN LIGATION     • VENOUS ACCESS DEVICE (PORT) INSERTION Right 2019   • VENOUS ACCESS DEVICE (PORT) REMOVAL Right 2019    Procedure: REMOVAL VENOUS ACCESS DEVICE;  Surgeon: Levy Mayfield MD;  Location: [facility] OR;  Service: General          Current Outpatient Medications:   •  Acetaminophen 325 MG capsule, TYLENOL CAPS prn, Disp: , Rfl:   •  ALPRAZolam (XANAX) 0.25 MG tablet, Take 1 tablet by mouth Daily. (Patient taking differently: Take 0.25 mg by mouth Every Night. ONLY HALF A TABLET), Disp: 30 tablet, Rfl: 3  •  Loratadine (CLARITIN PO), Take  by mouth Daily., Disp: , Rfl:   •  Multiple Vitamins-Minerals (MULTIVITAMIN ADULT PO), Take  by mouth Daily. With Calcium, Disp: , Rfl:   •  Trastuzumab (HERCEPTIN IV), Infuse 1 dose into a venous catheter Every 30 (Thirty) Days. Takes every 3 weeks, Disp: , Rfl:   •  vitamin B-12 (CYANOCOBALAMIN) 100 MCG tablet, Take 250 mcg by mouth Daily., Disp: , Rfl:   •  cetirizine (zyrTEC) 10 MG tablet, Take 10 mg by mouth Daily., Disp: , Rfl:   •  famotidine (PEPCID) 20 MG tablet, Take 20 mg by mouth Daily., Disp: , Rfl:   •  raNITIdine (ZANTAC) 150 MG tablet, Take 150 mg by mouth Every Night., Disp: , Rfl:     Allergies   Allergen Reactions   • Morphine Palpitations       Family History   Problem Relation Age of Onset   • Breast cancer Sister 58       Cancer-related family history includes Breast cancer (age of onset: 58) in her sister.    Social History     Tobacco Use   • Smoking status: Former Smoker     Packs/day: 0.00     Years: 10.00     Pack years: 0.00     Types: Cigarettes     Start date:      Last attempt to quit: 2019     Years since quittin.0   • Smokeless tobacco: Never Used   Substance Use Topics   • Alcohol use: No     Frequency: Never   • Drug use: No       I have reviewed the history of present illness, past medical history, family history, social history, lab results, all notes and other records since the patient was last seen on 2019    SUBJECTIVE:      Patient in my office for follow-up during chemotherapy.  Patient is receiving single agent Herceptin now.  Tolerating it well.   Anxiety is controlled with the Xanax.  Develop at rest upper respiratory congestion and  "low-grade temperature.  Has wheezing, had to take time off from work.  ROS:       Review of Systems   Constitutional: Negative for fever.   HENT: Negative for nosebleeds and trouble swallowing.    Eyes: Negative for visual disturbance.   Respiratory: Negative for cough, shortness of breath and wheezing.    Cardiovascular: Negative for chest pain.   Gastrointestinal: Negative for abdominal pain and blood in stool.   Endocrine: Negative for cold intolerance.   Genitourinary: Negative for dysuria and hematuria.   Musculoskeletal: Negative for joint swelling.   Skin: Negative for rash.   Allergic/Immunologic: Negative for environmental allergies.   Neurological: Negative for seizures.   Hematological: Does not bruise/bleed easily.   Psychiatric/Behavioral: The patient is not nervous/anxious.          Objective:       Vitals:    11/13/19 1030   BP: 123/81   Pulse: 105   Resp: 20   Temp: 98.2 °F (36.8 °C)   Weight: 49.5 kg (109 lb 3.2 oz)   Height: 165.1 cm (65\")   PainSc: 0-No pain         PHYSICAL EXAM:       Physical Exam   Constitutional: She is oriented to person, place, and time. No distress.   HENT:   Head: Normocephalic and atraumatic.   Eyes: Conjunctivae and EOM are normal. Right eye exhibits no discharge. Left eye exhibits no discharge. No scleral icterus.   Neck: Normal range of motion. Neck supple. No thyromegaly present.   Cardiovascular: Normal rate, regular rhythm and normal heart sounds. Exam reveals no gallop and no friction rub.   Pulmonary/Chest: Effort normal. No stridor. No respiratory distress. She has no wheezes.   Abdominal: Soft. Bowel sounds are normal. She exhibits no mass. There is no tenderness. There is no rebound and no guarding.   Musculoskeletal: Normal range of motion. She exhibits no tenderness.   Lymphadenopathy:     She has no cervical adenopathy.   Neurological: She is alert and oriented to person, place, and time. She exhibits normal muscle tone.   Skin: Skin is warm. No rash noted. " She is not diaphoretic. No erythema.   Psychiatric: She has a normal mood and affect. Her behavior is normal.   Nursing note and vitals reviewed.       RECENT LABS:     WBC   Date Value Ref Range Status   10/31/2019 7.00 3.40 - 10.80 10*3/mm3 Final     RBC   Date Value Ref Range Status   10/31/2019 4.17 3.77 - 5.28 10*6/mm3 Final   10/29/2018 4.82 3.80 - 5.10 Million/uL Final     Hemoglobin   Date Value Ref Range Status   10/31/2019 13.6 12.0 - 15.9 g/dL Final     Hematocrit   Date Value Ref Range Status   10/31/2019 40.2 34.0 - 46.6 % Final     MCV   Date Value Ref Range Status   10/31/2019 96.4 79.0 - 97.0 fL Final     MCH   Date Value Ref Range Status   10/31/2019 32.6 26.6 - 33.0 pg Final     MCHC   Date Value Ref Range Status   10/31/2019 33.8 31.5 - 35.7 g/dL Final     RDW   Date Value Ref Range Status   10/31/2019 13.7 12.3 - 15.4 % Final     RDW-SD   Date Value Ref Range Status   10/31/2019 47.2 37.0 - 54.0 fl Final     MPV   Date Value Ref Range Status   10/31/2019 10.1 6.0 - 12.0 fL Final     Platelets   Date Value Ref Range Status   10/31/2019 157 140 - 450 10*3/mm3 Final     Neutrophil %   Date Value Ref Range Status   10/31/2019 61.7 42.7 - 76.0 % Final     Lymphocyte %   Date Value Ref Range Status   10/31/2019 27.1 19.6 - 45.3 % Final     Monocyte %   Date Value Ref Range Status   10/31/2019 7.3 5.0 - 12.0 % Final     Eosinophil %   Date Value Ref Range Status   10/31/2019 3.6 0.3 - 6.2 % Final     Basophil %   Date Value Ref Range Status   10/31/2019 0.3 0.0 - 1.5 % Final     Neutrophils, Absolute   Date Value Ref Range Status   10/31/2019 4.32 1.70 - 7.00 10*3/mm3 Final     Lymphocytes, Absolute   Date Value Ref Range Status   10/31/2019 1.90 0.70 - 3.10 10*3/mm3 Final     Monocytes, Absolute   Date Value Ref Range Status   10/31/2019 0.51 0.10 - 0.90 10*3/mm3 Final     Eosinophils, Absolute   Date Value Ref Range Status   10/31/2019 0.25 0.00 - 0.40 10*3/mm3 Final     Basophils, Absolute   Date  Value Ref Range Status   10/31/2019 0.02 0.00 - 0.20 10*3/mm3 Final     nRBC   Date Value Ref Range Status   03/05/2019 0 0 /100[WBCs] Final       Lab Results   Component Value Date    GLUCOSE 96 09/11/2019    BUN 15 09/11/2019    CREATININE 1.10 (H) 09/11/2019    EGFRIFNONA 51 (L) 09/11/2019    EGFRIFAFRI >60 06/10/2019    BCR 13.6 09/11/2019    K 4.6 09/11/2019    CO2 29.0 09/11/2019    CALCIUM 9.6 09/11/2019    ALBUMIN 3.80 09/11/2019    LABIL2 1.3 06/10/2019    AST 24 09/11/2019    ALT 21 09/11/2019         Assessment/Plan      ASSESSMENT:     1. Left breast pT1b, pN0, Mx infiltrating ductal carcinoma, ER negative, CO negative and HER-2/martha positive.   2. Chemo induced anemia  3. Anxiety  4. Bronchitis  5. ECOG 1      PLAN:      1. Reviewed the laboratory work-up with the patient.  Hemoglobin is improved after stopping the chemotherapy.    2. Chemo induced anemia has resolved  3. Continue to be active encourage the patient.  4. Check CBC with chemo check CA-27-29.  5. Patient completed combination chemotherapy.  She is now on single agent Herceptin.  Continued for a total of 1 year.  Discussed the 2D echo results with the patient ejection fraction is normal.    6. Xanax will be continued at the reduced dose of 0.25 mg a day   7. I will start start her on treatment with Z-Jhon for bronchitis.  Patient report to us if she develops high-grade fever.  8. I will see her back in my office in 2 months recheck CBC CMP and CA-27-29 at that time    I have reviewed labs results, imaging, vitals, and medications with the patient today.         Patient verbalized understanding and is in agreement of the above plan.          This report was compiled using Dragon voice recognition software. I have made every effort to proof read this document; however, typographical errors may persist.

## 2019-11-20 ENCOUNTER — HOSPITAL ENCOUNTER (OUTPATIENT)
Dept: ONCOLOGY | Facility: HOSPITAL | Age: 59
Setting detail: INFUSION SERIES
Discharge: HOME | End: 2019-11-20

## 2019-11-20 VITALS
BODY MASS INDEX: 18.33 KG/M2 | HEART RATE: 53 BPM | WEIGHT: 110 LBS | HEIGHT: 65 IN | TEMPERATURE: 97.9 F | DIASTOLIC BLOOD PRESSURE: 69 MMHG | RESPIRATION RATE: 20 BRPM | SYSTOLIC BLOOD PRESSURE: 111 MMHG

## 2019-11-20 DIAGNOSIS — C50.912 INFILTRATING DUCTAL CARCINOMA OF LEFT BREAST (HCC): Primary | ICD-10-CM

## 2019-11-20 LAB
BASOPHILS # BLD AUTO: 0.02 10*3/MM3 (ref 0–0.2)
BASOPHILS NFR BLD AUTO: 0.2 % (ref 0–1.5)
DEPRECATED RDW RBC AUTO: 50.3 FL (ref 37–54)
EOSINOPHIL # BLD AUTO: 0.21 10*3/MM3 (ref 0–0.4)
EOSINOPHIL NFR BLD AUTO: 2 % (ref 0.3–6.2)
ERYTHROCYTE [DISTWIDTH] IN BLOOD BY AUTOMATED COUNT: 14.4 % (ref 12.3–15.4)
HCT VFR BLD AUTO: 38.6 % (ref 34–46.6)
HGB BLD-MCNC: 12.7 G/DL (ref 12–15.9)
LYMPHOCYTES # BLD AUTO: 1.91 10*3/MM3 (ref 0.7–3.1)
LYMPHOCYTES NFR BLD AUTO: 18.5 % (ref 19.6–45.3)
MCH RBC QN AUTO: 32.4 PG (ref 26.6–33)
MCHC RBC AUTO-ENTMCNC: 32.9 G/DL (ref 31.5–35.7)
MCV RBC AUTO: 98.5 FL (ref 79–97)
MONOCYTES # BLD AUTO: 0.74 10*3/MM3 (ref 0.1–0.9)
MONOCYTES NFR BLD AUTO: 7.2 % (ref 5–12)
NEUTROPHILS # BLD AUTO: 7.43 10*3/MM3 (ref 1.7–7)
NEUTROPHILS NFR BLD AUTO: 72.1 % (ref 42.7–76)
PLATELET # BLD AUTO: 231 10*3/MM3 (ref 140–450)
PMV BLD AUTO: 9.1 FL (ref 6–12)
RBC # BLD AUTO: 3.92 10*6/MM3 (ref 3.77–5.28)
WBC NRBC COR # BLD: 10.31 10*3/MM3 (ref 3.4–10.8)

## 2019-11-20 PROCEDURE — 36415 COLL VENOUS BLD VENIPUNCTURE: CPT | Performed by: NURSE PRACTITIONER

## 2019-11-20 PROCEDURE — 85025 COMPLETE CBC W/AUTO DIFF WBC: CPT | Performed by: NURSE PRACTITIONER

## 2019-11-20 PROCEDURE — 85025 COMPLETE CBC W/AUTO DIFF WBC: CPT | Performed by: INTERNAL MEDICINE

## 2019-11-20 PROCEDURE — 96413 CHEMO IV INFUSION 1 HR: CPT | Performed by: NURSE PRACTITIONER

## 2019-11-20 PROCEDURE — 25010000002 TRASTUZUMAB PER 10 MG: Performed by: NURSE PRACTITIONER

## 2019-11-20 RX ORDER — SODIUM CHLORIDE 9 MG/ML
250 INJECTION, SOLUTION INTRAVENOUS ONCE
Status: DISCONTINUED | OUTPATIENT
Start: 2019-11-20 | End: 2019-11-21 | Stop reason: HOSPADM

## 2019-11-20 RX ADMIN — TRASTUZUMAB 300 MG: 150 INJECTION, POWDER, LYOPHILIZED, FOR SOLUTION INTRAVENOUS at 11:49

## 2019-12-09 DIAGNOSIS — C50.912 INFILTRATING DUCTAL CARCINOMA OF LEFT BREAST (HCC): ICD-10-CM

## 2019-12-09 RX ORDER — SODIUM CHLORIDE 9 MG/ML
250 INJECTION, SOLUTION INTRAVENOUS ONCE
Status: CANCELLED | OUTPATIENT
Start: 2019-12-11

## 2019-12-11 ENCOUNTER — HOSPITAL ENCOUNTER (OUTPATIENT)
Dept: ONCOLOGY | Facility: HOSPITAL | Age: 59
Setting detail: INFUSION SERIES
Discharge: HOME | End: 2019-12-11

## 2019-12-11 VITALS
WEIGHT: 115 LBS | BODY MASS INDEX: 19.14 KG/M2 | HEART RATE: 102 BPM | SYSTOLIC BLOOD PRESSURE: 166 MMHG | DIASTOLIC BLOOD PRESSURE: 90 MMHG | TEMPERATURE: 98.1 F | RESPIRATION RATE: 18 BRPM

## 2019-12-11 DIAGNOSIS — C50.912 INFILTRATING DUCTAL CARCINOMA OF LEFT BREAST (HCC): Primary | ICD-10-CM

## 2019-12-11 LAB
BASOPHILS # BLD AUTO: 0.03 10*3/MM3 (ref 0–0.2)
BASOPHILS NFR BLD AUTO: 0.5 % (ref 0–1.5)
DEPRECATED RDW RBC AUTO: 49.4 FL (ref 37–54)
EOSINOPHIL # BLD AUTO: 0.26 10*3/MM3 (ref 0–0.4)
EOSINOPHIL NFR BLD AUTO: 4.6 % (ref 0.3–6.2)
ERYTHROCYTE [DISTWIDTH] IN BLOOD BY AUTOMATED COUNT: 14.3 % (ref 12.3–15.4)
HCT VFR BLD AUTO: 39.9 % (ref 34–46.6)
HGB BLD-MCNC: 13.4 G/DL (ref 12–15.9)
LYMPHOCYTES # BLD AUTO: 2.39 10*3/MM3 (ref 0.7–3.1)
LYMPHOCYTES NFR BLD AUTO: 42.3 % (ref 19.6–45.3)
MCH RBC QN AUTO: 32.6 PG (ref 26.6–33)
MCHC RBC AUTO-ENTMCNC: 33.6 G/DL (ref 31.5–35.7)
MCV RBC AUTO: 97.1 FL (ref 79–97)
MONOCYTES # BLD AUTO: 0.54 10*3/MM3 (ref 0.1–0.9)
MONOCYTES NFR BLD AUTO: 9.6 % (ref 5–12)
NEUTROPHILS # BLD AUTO: 2.43 10*3/MM3 (ref 1.7–7)
NEUTROPHILS NFR BLD AUTO: 43 % (ref 42.7–76)
PLATELET # BLD AUTO: 147 10*3/MM3 (ref 140–450)
PMV BLD AUTO: 10 FL (ref 6–12)
RBC # BLD AUTO: 4.11 10*6/MM3 (ref 3.77–5.28)
WBC NRBC COR # BLD: 5.65 10*3/MM3 (ref 3.4–10.8)

## 2019-12-11 PROCEDURE — 36415 COLL VENOUS BLD VENIPUNCTURE: CPT | Performed by: NURSE PRACTITIONER

## 2019-12-11 PROCEDURE — 96413 CHEMO IV INFUSION 1 HR: CPT | Performed by: NURSE PRACTITIONER

## 2019-12-11 PROCEDURE — 25010000002 TRASTUZUMAB PER 10 MG: Performed by: NURSE PRACTITIONER

## 2019-12-11 PROCEDURE — 85025 COMPLETE CBC W/AUTO DIFF WBC: CPT | Performed by: NURSE PRACTITIONER

## 2019-12-11 RX ORDER — SODIUM CHLORIDE 9 MG/ML
250 INJECTION, SOLUTION INTRAVENOUS ONCE
Status: DISCONTINUED | OUTPATIENT
Start: 2019-12-11 | End: 2019-12-12 | Stop reason: HOSPADM

## 2019-12-11 RX ADMIN — TRASTUZUMAB 300 MG: 150 INJECTION, POWDER, LYOPHILIZED, FOR SOLUTION INTRAVENOUS at 10:58

## 2019-12-12 ENCOUNTER — HOSPITAL ENCOUNTER (OUTPATIENT)
Dept: CARDIOLOGY | Facility: HOSPITAL | Age: 59
Discharge: HOME | End: 2019-12-12
Admitting: NURSE PRACTITIONER

## 2019-12-12 VITALS
HEIGHT: 65 IN | WEIGHT: 111 LBS | DIASTOLIC BLOOD PRESSURE: 74 MMHG | BODY MASS INDEX: 18.49 KG/M2 | SYSTOLIC BLOOD PRESSURE: 130 MMHG

## 2019-12-12 DIAGNOSIS — C50.912 INFILTRATING DUCTAL CARCINOMA OF LEFT BREAST (HCC): ICD-10-CM

## 2019-12-12 LAB
BH CV ECHO MEAS - ACS: 1.7 CM
BH CV ECHO MEAS - AO MAX PG (FULL): 4.3 MMHG
BH CV ECHO MEAS - AO MAX PG: 7 MMHG
BH CV ECHO MEAS - AO MEAN PG (FULL): 2.6 MMHG
BH CV ECHO MEAS - AO MEAN PG: 4.1 MMHG
BH CV ECHO MEAS - AO ROOT AREA (BSA CORRECTED): 1.9
BH CV ECHO MEAS - AO ROOT AREA: 6.4 CM^2
BH CV ECHO MEAS - AO ROOT DIAM: 2.9 CM
BH CV ECHO MEAS - AO V2 MAX: 132.2 CM/SEC
BH CV ECHO MEAS - AO V2 MEAN: 97.4 CM/SEC
BH CV ECHO MEAS - AO V2 VTI: 29.7 CM
BH CV ECHO MEAS - ASC AORTA: 3 CM
BH CV ECHO MEAS - AVA(I,A): 1.7 CM^2
BH CV ECHO MEAS - AVA(I,D): 1.7 CM^2
BH CV ECHO MEAS - AVA(V,A): 1.7 CM^2
BH CV ECHO MEAS - AVA(V,D): 1.7 CM^2
BH CV ECHO MEAS - BSA(HAYCOCK): 1.5 M^2
BH CV ECHO MEAS - BSA: 1.5 M^2
BH CV ECHO MEAS - BZI_BMI: 18.5 KILOGRAMS/M^2
BH CV ECHO MEAS - BZI_METRIC_HEIGHT: 165.1 CM
BH CV ECHO MEAS - BZI_METRIC_WEIGHT: 50.3 KG
BH CV ECHO MEAS - EDV(CUBED): 82.8 ML
BH CV ECHO MEAS - EDV(MOD-SP2): 56.9 ML
BH CV ECHO MEAS - EDV(MOD-SP4): 59.6 ML
BH CV ECHO MEAS - EDV(TEICH): 85.7 ML
BH CV ECHO MEAS - EF(CUBED): 50.1 %
BH CV ECHO MEAS - EF(MOD-BP): 52 %
BH CV ECHO MEAS - EF(MOD-SP2): 45.3 %
BH CV ECHO MEAS - EF(MOD-SP4): 61.1 %
BH CV ECHO MEAS - EF(TEICH): 42.4 %
BH CV ECHO MEAS - ESV(CUBED): 41.3 ML
BH CV ECHO MEAS - ESV(MOD-SP2): 31.2 ML
BH CV ECHO MEAS - ESV(MOD-SP4): 23.2 ML
BH CV ECHO MEAS - ESV(TEICH): 49.4 ML
BH CV ECHO MEAS - FS: 20.7 %
BH CV ECHO MEAS - IVS/LVPW: 0.93
BH CV ECHO MEAS - IVSD: 0.72 CM
BH CV ECHO MEAS - LA DIMENSION(2D): 3.1 CM
BH CV ECHO MEAS - LA DIMENSION: 3.2 CM
BH CV ECHO MEAS - LA/AO: 1.1
BH CV ECHO MEAS - LV DIASTOLIC VOL/BSA (35-75): 38.7 ML/M^2
BH CV ECHO MEAS - LV MASS(C)D: 99.4 GRAMS
BH CV ECHO MEAS - LV MASS(C)DI: 64.5 GRAMS/M^2
BH CV ECHO MEAS - LV MAX PG: 2.7 MMHG
BH CV ECHO MEAS - LV MEAN PG: 1.5 MMHG
BH CV ECHO MEAS - LV SYSTOLIC VOL/BSA (12-30): 15.1 ML/M^2
BH CV ECHO MEAS - LV V1 MAX: 82.7 CM/SEC
BH CV ECHO MEAS - LV V1 MEAN: 56.4 CM/SEC
BH CV ECHO MEAS - LV V1 VTI: 17.9 CM
BH CV ECHO MEAS - LVIDD: 4.4 CM
BH CV ECHO MEAS - LVIDS: 3.5 CM
BH CV ECHO MEAS - LVOT AREA: 2.8 CM^2
BH CV ECHO MEAS - LVOT DIAM: 1.9 CM
BH CV ECHO MEAS - LVPWD: 0.78 CM
BH CV ECHO MEAS - MV A MAX VEL: 57.8 CM/SEC
BH CV ECHO MEAS - MV DEC SLOPE: 560.6 CM/SEC^2
BH CV ECHO MEAS - MV DEC TIME: 0.16 SEC
BH CV ECHO MEAS - MV E MAX VEL: 87.6 CM/SEC
BH CV ECHO MEAS - MV E/A: 1.5
BH CV ECHO MEAS - MV MAX PG: 4.4 MMHG
BH CV ECHO MEAS - MV MEAN PG: 1.2 MMHG
BH CV ECHO MEAS - MV V2 MAX: 105.4 CM/SEC
BH CV ECHO MEAS - MV V2 MEAN: 45.7 CM/SEC
BH CV ECHO MEAS - MV V2 VTI: 31.4 CM
BH CV ECHO MEAS - MVA(VTI): 1.6 CM^2
BH CV ECHO MEAS - PA ACC TIME: 0.08 SEC
BH CV ECHO MEAS - PA MAX PG (FULL): 0.07 MMHG
BH CV ECHO MEAS - PA MAX PG: 4.8 MMHG
BH CV ECHO MEAS - PA PR(ACCEL): 43.2 MMHG
BH CV ECHO MEAS - PA V2 MAX: 109.1 CM/SEC
BH CV ECHO MEAS - PULM A REVS VEL: 24.9 CM/SEC
BH CV ECHO MEAS - PULM DIAS VEL: 44.9 CM/SEC
BH CV ECHO MEAS - PULM S/D: 0.94
BH CV ECHO MEAS - PULM SYS VEL: 42.3 CM/SEC
BH CV ECHO MEAS - RAP SYSTOLE: 8 MMHG
BH CV ECHO MEAS - RV MAX PG: 4.7 MMHG
BH CV ECHO MEAS - RV MEAN PG: 2.8 MMHG
BH CV ECHO MEAS - RV V1 MAX: 108.3 CM/SEC
BH CV ECHO MEAS - RV V1 MEAN: 81.6 CM/SEC
BH CV ECHO MEAS - RV V1 VTI: 21.5 CM
BH CV ECHO MEAS - RVDD: 2.2 CM
BH CV ECHO MEAS - RVSP: 22.1 MMHG
BH CV ECHO MEAS - SI(AO): 123.8 ML/M^2
BH CV ECHO MEAS - SI(CUBED): 26.9 ML/M^2
BH CV ECHO MEAS - SI(LVOT): 32.4 ML/M^2
BH CV ECHO MEAS - SI(MOD-SP2): 16.7 ML/M^2
BH CV ECHO MEAS - SI(MOD-SP4): 23.6 ML/M^2
BH CV ECHO MEAS - SI(TEICH): 23.6 ML/M^2
BH CV ECHO MEAS - SV(AO): 190.7 ML
BH CV ECHO MEAS - SV(CUBED): 41.4 ML
BH CV ECHO MEAS - SV(LVOT): 49.9 ML
BH CV ECHO MEAS - SV(MOD-SP2): 25.8 ML
BH CV ECHO MEAS - SV(MOD-SP4): 36.4 ML
BH CV ECHO MEAS - SV(TEICH): 36.3 ML
BH CV ECHO MEAS - TR MAX VEL: 187.8 CM/SEC
LV EF 2D ECHO EST: 50 %

## 2019-12-12 PROCEDURE — 0399T HC MYOCARDL STRAIN IMAG QUAN ASSMT PER SESS: CPT

## 2019-12-12 PROCEDURE — 93306 TTE W/DOPPLER COMPLETE: CPT | Performed by: INTERNAL MEDICINE

## 2019-12-12 PROCEDURE — 93306 TTE W/DOPPLER COMPLETE: CPT

## 2019-12-31 DIAGNOSIS — C50.912 INFILTRATING DUCTAL CARCINOMA OF LEFT BREAST (HCC): ICD-10-CM

## 2019-12-31 RX ORDER — SODIUM CHLORIDE 9 MG/ML
250 INJECTION, SOLUTION INTRAVENOUS ONCE
Status: CANCELLED | OUTPATIENT
Start: 2020-01-02

## 2019-12-31 RX ORDER — SODIUM CHLORIDE 9 MG/ML
250 INJECTION, SOLUTION INTRAVENOUS ONCE
Status: CANCELLED | OUTPATIENT
Start: 2020-01-22

## 2020-01-02 ENCOUNTER — HOSPITAL ENCOUNTER (OUTPATIENT)
Dept: ONCOLOGY | Facility: HOSPITAL | Age: 60
Setting detail: INFUSION SERIES
Discharge: HOME | End: 2020-01-02

## 2020-01-02 VITALS
RESPIRATION RATE: 16 BRPM | TEMPERATURE: 97.5 F | HEART RATE: 78 BPM | DIASTOLIC BLOOD PRESSURE: 78 MMHG | HEIGHT: 67 IN | SYSTOLIC BLOOD PRESSURE: 126 MMHG | WEIGHT: 109 LBS | BODY MASS INDEX: 17.11 KG/M2

## 2020-01-02 DIAGNOSIS — C50.912 INFILTRATING DUCTAL CARCINOMA OF LEFT BREAST (HCC): Primary | ICD-10-CM

## 2020-01-02 LAB
BASOPHILS # BLD AUTO: 0.03 10*3/MM3 (ref 0–0.2)
BASOPHILS NFR BLD AUTO: 0.4 % (ref 0–1.5)
DEPRECATED RDW RBC AUTO: 47.8 FL (ref 37–54)
EOSINOPHIL # BLD AUTO: 0.23 10*3/MM3 (ref 0–0.4)
EOSINOPHIL NFR BLD AUTO: 3.4 % (ref 0.3–6.2)
ERYTHROCYTE [DISTWIDTH] IN BLOOD BY AUTOMATED COUNT: 13.6 % (ref 12.3–15.4)
HCT VFR BLD AUTO: 43 % (ref 34–46.6)
HGB BLD-MCNC: 14.5 G/DL (ref 12–15.9)
LYMPHOCYTES # BLD AUTO: 2.86 10*3/MM3 (ref 0.7–3.1)
LYMPHOCYTES NFR BLD AUTO: 42.2 % (ref 19.6–45.3)
MCH RBC QN AUTO: 33.1 PG (ref 26.6–33)
MCHC RBC AUTO-ENTMCNC: 33.7 G/DL (ref 31.5–35.7)
MCV RBC AUTO: 98.2 FL (ref 79–97)
MONOCYTES # BLD AUTO: 0.5 10*3/MM3 (ref 0.1–0.9)
MONOCYTES NFR BLD AUTO: 7.4 % (ref 5–12)
NEUTROPHILS # BLD AUTO: 3.15 10*3/MM3 (ref 1.7–7)
NEUTROPHILS NFR BLD AUTO: 46.6 % (ref 42.7–76)
PLATELET # BLD AUTO: 156 10*3/MM3 (ref 140–450)
PMV BLD AUTO: 10 FL (ref 6–12)
RBC # BLD AUTO: 4.38 10*6/MM3 (ref 3.77–5.28)
WBC NRBC COR # BLD: 6.77 10*3/MM3 (ref 3.4–10.8)

## 2020-01-02 PROCEDURE — 25010000002 TRASTUZUMAB PER 10 MG: Performed by: NURSE PRACTITIONER

## 2020-01-02 PROCEDURE — 96413 CHEMO IV INFUSION 1 HR: CPT | Performed by: NURSE PRACTITIONER

## 2020-01-02 PROCEDURE — 36415 COLL VENOUS BLD VENIPUNCTURE: CPT | Performed by: NURSE PRACTITIONER

## 2020-01-02 PROCEDURE — 85025 COMPLETE CBC W/AUTO DIFF WBC: CPT | Performed by: NURSE PRACTITIONER

## 2020-01-02 RX ORDER — SODIUM CHLORIDE 9 MG/ML
250 INJECTION, SOLUTION INTRAVENOUS ONCE
Status: DISCONTINUED | OUTPATIENT
Start: 2020-01-02 | End: 2020-01-03 | Stop reason: HOSPADM

## 2020-01-02 RX ADMIN — TRASTUZUMAB 300 MG: 150 INJECTION, POWDER, LYOPHILIZED, FOR SOLUTION INTRAVENOUS at 09:59

## 2020-01-14 DIAGNOSIS — C50.912 INFILTRATING DUCTAL CARCINOMA OF LEFT BREAST (HCC): Primary | ICD-10-CM

## 2020-01-15 ENCOUNTER — OFFICE VISIT (OUTPATIENT)
Dept: ONCOLOGY | Facility: CLINIC | Age: 60
End: 2020-01-15

## 2020-01-15 ENCOUNTER — OFFICE VISIT (OUTPATIENT)
Dept: LAB | Facility: HOSPITAL | Age: 60
End: 2020-01-15

## 2020-01-15 VITALS
WEIGHT: 109.6 LBS | BODY MASS INDEX: 18.26 KG/M2 | TEMPERATURE: 98.3 F | RESPIRATION RATE: 20 BRPM | HEART RATE: 66 BPM | DIASTOLIC BLOOD PRESSURE: 85 MMHG | SYSTOLIC BLOOD PRESSURE: 146 MMHG | HEIGHT: 65 IN

## 2020-01-15 DIAGNOSIS — C50.912 INFILTRATING DUCTAL CARCINOMA OF LEFT BREAST (HCC): ICD-10-CM

## 2020-01-15 DIAGNOSIS — C50.919 INFILTRATING DUCTAL CARCINOMA OF BREAST, UNSPECIFIED LATERALITY (HCC): Primary | ICD-10-CM

## 2020-01-15 LAB
ALBUMIN SERPL-MCNC: 4.6 G/DL (ref 3.5–5.2)
ALBUMIN/GLOB SERPL: 1.6 G/DL
ALP SERPL-CCNC: 69 U/L (ref 39–117)
ALT SERPL W P-5'-P-CCNC: 21 U/L (ref 1–33)
ANION GAP SERPL CALCULATED.3IONS-SCNC: 10 MMOL/L (ref 5–15)
AST SERPL-CCNC: 25 U/L (ref 1–32)
BASOPHILS # BLD AUTO: 0.02 10*3/MM3 (ref 0–0.2)
BASOPHILS NFR BLD AUTO: 0.3 % (ref 0–1.5)
BILIRUB SERPL-MCNC: 0.3 MG/DL (ref 0.2–1.2)
BUN BLD-MCNC: 15 MG/DL (ref 6–20)
BUN/CREAT SERPL: 14.3 (ref 7–25)
CALCIUM SPEC-SCNC: 10.4 MG/DL (ref 8.6–10.5)
CHLORIDE SERPL-SCNC: 102 MMOL/L (ref 98–107)
CO2 SERPL-SCNC: 31 MMOL/L (ref 22–29)
CREAT BLD-MCNC: 1.05 MG/DL (ref 0.57–1)
DEPRECATED RDW RBC AUTO: 47.9 FL (ref 37–54)
EOSINOPHIL # BLD AUTO: 0.31 10*3/MM3 (ref 0–0.4)
EOSINOPHIL NFR BLD AUTO: 4.6 % (ref 0.3–6.2)
ERYTHROCYTE [DISTWIDTH] IN BLOOD BY AUTOMATED COUNT: 13.6 % (ref 12.3–15.4)
GFR SERPL CREATININE-BSD FRML MDRD: 54 ML/MIN/1.73
GLOBULIN UR ELPH-MCNC: 2.9 GM/DL
GLUCOSE BLD-MCNC: 105 MG/DL (ref 65–99)
HCT VFR BLD AUTO: 44.4 % (ref 34–46.6)
HGB BLD-MCNC: 14.9 G/DL (ref 12–15.9)
LYMPHOCYTES # BLD AUTO: 2.46 10*3/MM3 (ref 0.7–3.1)
LYMPHOCYTES NFR BLD AUTO: 36.3 % (ref 19.6–45.3)
MCH RBC QN AUTO: 33 PG (ref 26.6–33)
MCHC RBC AUTO-ENTMCNC: 33.6 G/DL (ref 31.5–35.7)
MCV RBC AUTO: 98.2 FL (ref 79–97)
MONOCYTES # BLD AUTO: 0.64 10*3/MM3 (ref 0.1–0.9)
MONOCYTES NFR BLD AUTO: 9.4 % (ref 5–12)
NEUTROPHILS # BLD AUTO: 3.35 10*3/MM3 (ref 1.7–7)
NEUTROPHILS NFR BLD AUTO: 49.4 % (ref 42.7–76)
PLATELET # BLD AUTO: 163 10*3/MM3 (ref 140–450)
PMV BLD AUTO: 9.5 FL (ref 6–12)
POTASSIUM BLD-SCNC: 4.6 MMOL/L (ref 3.5–5.2)
PROT SERPL-MCNC: 7.5 G/DL (ref 6–8.5)
RBC # BLD AUTO: 4.52 10*6/MM3 (ref 3.77–5.28)
SODIUM BLD-SCNC: 143 MMOL/L (ref 136–145)
WBC NRBC COR # BLD: 6.78 10*3/MM3 (ref 3.4–10.8)

## 2020-01-15 PROCEDURE — 80053 COMPREHEN METABOLIC PANEL: CPT | Performed by: NURSE PRACTITIONER

## 2020-01-15 PROCEDURE — 99215 OFFICE O/P EST HI 40 MIN: CPT | Performed by: INTERNAL MEDICINE

## 2020-01-15 PROCEDURE — 85025 COMPLETE CBC W/AUTO DIFF WBC: CPT

## 2020-01-15 PROCEDURE — 36415 COLL VENOUS BLD VENIPUNCTURE: CPT

## 2020-01-15 NOTE — PROGRESS NOTES
Hematology/Oncology Outpatient Follow Up    Lisa Magallanes  1960    Primary Care Physician: Tin Eli MD  Referring Physician: Tin Eli MD  Chief complaint:  Left breast pT1b, pN0, Mx infiltrating ductal carcinoma, ER negative, OK negative and HER-2/martha positive.   History of Present Illness:   · Ms. Magallanes reports her sister was diagnosed with breast cancer in  2017.  Patient never had any mammograms done on herself and had her initial one done in November 2018.    · 11/6/18 - Bilateral screening mammogram showed left breast global asymmetry within the upper half of the left breast and right breast focal asymmetry within the lower inner quadrant.This followed up with initial biopsy of the right breast mass.    · 12/12/18 - CT-guided biopsy of the right breast mass at 4 o’clock, which is 4 to 5 cm from the  nipple, were fragments of hyalinized fibroadenoma, negative for carcinoma in situ and invasive carcinoma.     · 12/12/18 - On the left breast stereotactic biopsy of the calcifications, benign fibrofatty breast tissue.  No malignancy.  Another biopsy, specimen 2, of calcifications was positive for DCIS, high nuclear grade, solid type with central necrosis.  No invasive malignancy was identified.  Tumor was ER negative, OK negative.  · 1/29/19 - MRI of the breast bilaterally showed 7 x 6 mm medial to the biopsy clip of the left breast at the biopsy proven DCIS.  Could be residual malignancy near the post biopsy change at the site of recently diagnosed DCIS in the left breast.  An 11 mm mass concerning for invasive malignancy and three areas of focal non-mass enhancement in the upper outer and lower outer quadrants of the left breast.  No MR signs of malignancy in the right breast.     · 2/5/19 - MRI biopsy of the left breast mass at 6 o'clock position, invasive poorly differentiated mammary carcinoma with [place] grade of 8/9.  No in situ carcinoma was identified.  Tumor ER  negative, VT negative and HER-2/martha testing was positive.   · Patient was sent to the [facility] and seen initially on 19.   · 19 - Left breast mastectomy, invasive poorly differentiated ductal carcinoma, 8 mm, pT1b with associated high grade DCIS.  Margins negative. One benign sentinel lymph node.   [place] score.  DCIS present 7 mm. Tumor ER negative, VT negative, HER-2/martha positive.    · 3/18/19 - Adjuvant chemotherapy with Taxotere, Carboplatin and Herceptin was initiated.    · 3/20/19 - PET/CT scan with no evidence of metastatic disease within the neck, chest, abdomen or  pelvis.  There is cholelithiasis.    · 7/10/2019 patient completed 6 cycles of adjuvant TCH   · 2019 patient initiated on single agent Herceptin   · 2019 2D echo- Left ventricular systolic function is normal. LV ejection fraction is about 60%  · 2019 2D echo shows ejection fraction of 50% with left ventricular systolic function is normal      Past Medical History:   Diagnosis Date   • Abnormal TSH    • Allergic rhinitis    • Annual visit for general adult medical examination with abnormal findings 2019   • Breast cancer (CMS/HCC)     left   • Carpal tunnel syndrome    • Cytopenia     Chemo-induced cytopenia   • Ductal carcinoma in situ (DCIS) of left breast    • Fatigue     resolved   • Fibroadenoma    • GERD (gastroesophageal reflux disease)    • Hyperlipidemia    • Sleep disorder    • Tobacco use 2019   • Underweight 2019       Past Surgical History:   Procedure Laterality Date   •  SECTION     •  SECTION     • MASTECTOMY Left 2019   • POSTPARTUM TUBAL LIGATION     • TUBAL ABDOMINAL LIGATION     • VEIN LIGATION     • VENOUS ACCESS DEVICE (PORT) INSERTION Right 2019   • VENOUS ACCESS DEVICE (PORT) REMOVAL Right 2019    Procedure: REMOVAL VENOUS ACCESS DEVICE;  Surgeon: Levy Mayfield MD;  Location: [facility] MAIN OR;  Service: General         Current  Outpatient Medications:   •  ALPRAZolam (XANAX) 0.25 MG tablet, Take 1 tablet by mouth Daily. (Patient taking differently: Take 0.25 mg by mouth Every Night. ONLY HALF A TABLET), Disp: 30 tablet, Rfl: 3  •  Loratadine (CLARITIN PO), Take  by mouth Daily., Disp: , Rfl:   •  vitamin B-12 (CYANOCOBALAMIN) 100 MCG tablet, Take 250 mcg by mouth Daily., Disp: , Rfl:   •  Acetaminophen 325 MG capsule, TYLENOL CAPS prn, Disp: , Rfl:   •  cetirizine (zyrTEC) 10 MG tablet, Take 10 mg by mouth Daily., Disp: , Rfl:   •  famotidine (PEPCID) 20 MG tablet, Take 20 mg by mouth Daily., Disp: , Rfl:   •  Multiple Vitamins-Minerals (MULTIVITAMIN ADULT PO), Take  by mouth Daily. With Calcium, Disp: , Rfl:   •  raNITIdine (ZANTAC) 150 MG tablet, Take 150 mg by mouth Every Night., Disp: , Rfl:   •  Trastuzumab (HERCEPTIN IV), Infuse 1 dose into a venous catheter Every 30 (Thirty) Days. Takes every 3 weeks, Disp: , Rfl:     Allergies   Allergen Reactions   • Morphine Palpitations       Family History   Problem Relation Age of Onset   • Breast cancer Sister 58       Cancer-related family history includes Breast cancer (age of onset: 58) in her sister.    Social History     Tobacco Use   • Smoking status: Former Smoker     Packs/day: 0.00     Years: 10.00     Pack years: 0.00     Types: Cigarettes     Start date:      Last attempt to quit: 2019     Years since quittin.1   • Smokeless tobacco: Never Used   Substance Use Topics   • Alcohol use: No     Frequency: Never   • Drug use: No       I have reviewed the history of present illness, past medical history, family history, social history, lab results, all notes and other records since the patient was last seen on 2019    SUBJECTIVE:      Patient in my office for follow-up during chemotherapy.  Patient is receiving single agent Herceptin now.  Tolerating it well.   Anxiety is controlled with the Xanax.  Develop at rest upper respiratory congestion and low-grade temperature.   "Has wheezing, had to take time off from work.  ROS:       Review of Systems   Constitutional: Negative for fever.   HENT: Negative for nosebleeds and trouble swallowing.    Eyes: Negative for visual disturbance.   Respiratory: Negative for cough, shortness of breath and wheezing.    Cardiovascular: Negative for chest pain.   Gastrointestinal: Negative for abdominal pain and blood in stool.   Endocrine: Negative for cold intolerance.   Genitourinary: Negative for dysuria and hematuria.   Musculoskeletal: Negative for joint swelling.   Skin: Negative for rash.   Allergic/Immunologic: Negative for environmental allergies.   Neurological: Negative for seizures.   Hematological: Does not bruise/bleed easily.   Psychiatric/Behavioral: The patient is not nervous/anxious.          Objective:       Vitals:    01/15/20 1106   BP: 146/85   Pulse: 66   Resp: 20   Temp: 98.3 °F (36.8 °C)   Weight: 49.7 kg (109 lb 9.6 oz)   Height: 165.1 cm (65\")         PHYSICAL EXAM:       Physical Exam   Constitutional: She is oriented to person, place, and time. No distress.   HENT:   Head: Normocephalic and atraumatic.   Eyes: Conjunctivae and EOM are normal. Right eye exhibits no discharge. Left eye exhibits no discharge. No scleral icterus.   Neck: Normal range of motion. Neck supple. No thyromegaly present.   Cardiovascular: Normal rate, regular rhythm and normal heart sounds. Exam reveals no gallop and no friction rub.   Pulmonary/Chest: Effort normal. No stridor. No respiratory distress. She has no wheezes.   Left breast status post mastectomy well-healed scar no scar recurrence  Right breast no palpable masses   Abdominal: Soft. Bowel sounds are normal. She exhibits no mass. There is no tenderness. There is no rebound and no guarding.   Musculoskeletal: Normal range of motion. She exhibits no tenderness.   Lymphadenopathy:     She has no cervical adenopathy.   Neurological: She is alert and oriented to person, place, and time. She " exhibits normal muscle tone.   Skin: Skin is warm. No rash noted. She is not diaphoretic. No erythema.   Psychiatric: She has a normal mood and affect. Her behavior is normal.   Nursing note and vitals reviewed.       RECENT LABS:     WBC   Date Value Ref Range Status   01/15/2020 6.78 3.40 - 10.80 10*3/mm3 Final     RBC   Date Value Ref Range Status   01/15/2020 4.52 3.77 - 5.28 10*6/mm3 Final   10/29/2018 4.82 3.80 - 5.10 Million/uL Final     Hemoglobin   Date Value Ref Range Status   01/15/2020 14.9 12.0 - 15.9 g/dL Final     Hematocrit   Date Value Ref Range Status   01/15/2020 44.4 34.0 - 46.6 % Final     MCV   Date Value Ref Range Status   01/15/2020 98.2 (H) 79.0 - 97.0 fL Final     MCH   Date Value Ref Range Status   01/15/2020 33.0 26.6 - 33.0 pg Final     MCHC   Date Value Ref Range Status   01/15/2020 33.6 31.5 - 35.7 g/dL Final     RDW   Date Value Ref Range Status   01/15/2020 13.6 12.3 - 15.4 % Final     RDW-SD   Date Value Ref Range Status   01/15/2020 47.9 37.0 - 54.0 fl Final     MPV   Date Value Ref Range Status   01/15/2020 9.5 6.0 - 12.0 fL Final     Platelets   Date Value Ref Range Status   01/15/2020 163 140 - 450 10*3/mm3 Final     Neutrophil %   Date Value Ref Range Status   01/15/2020 49.4 42.7 - 76.0 % Final     Lymphocyte %   Date Value Ref Range Status   01/15/2020 36.3 19.6 - 45.3 % Final     Monocyte %   Date Value Ref Range Status   01/15/2020 9.4 5.0 - 12.0 % Final     Eosinophil %   Date Value Ref Range Status   01/15/2020 4.6 0.3 - 6.2 % Final     Basophil %   Date Value Ref Range Status   01/15/2020 0.3 0.0 - 1.5 % Final     Neutrophils, Absolute   Date Value Ref Range Status   01/15/2020 3.35 1.70 - 7.00 10*3/mm3 Final     Lymphocytes, Absolute   Date Value Ref Range Status   01/15/2020 2.46 0.70 - 3.10 10*3/mm3 Final     Monocytes, Absolute   Date Value Ref Range Status   01/15/2020 0.64 0.10 - 0.90 10*3/mm3 Final     Eosinophils, Absolute   Date Value Ref Range Status    01/15/2020 0.31 0.00 - 0.40 10*3/mm3 Final     Basophils, Absolute   Date Value Ref Range Status   01/15/2020 0.02 0.00 - 0.20 10*3/mm3 Final     nRBC   Date Value Ref Range Status   03/05/2019 0 0 /100[WBCs] Final       Lab Results   Component Value Date    GLUCOSE 105 (H) 01/15/2020    BUN 15 01/15/2020    CREATININE 1.05 (H) 01/15/2020    EGFRIFNONA 54 (L) 01/15/2020    EGFRIFAFRI >60 06/10/2019    BCR 14.3 01/15/2020    K 4.6 01/15/2020    CO2 31.0 (H) 01/15/2020    CALCIUM 10.4 01/15/2020    ALBUMIN 4.60 01/15/2020    LABIL2 1.3 06/10/2019    AST 25 01/15/2020    ALT 21 01/15/2020         Assessment/Plan      ASSESSMENT:     1. Left breast pT1b, pN0, Mx infiltrating ductal carcinoma, ER negative, NH negative and HER-2/martha positive.   2. Chemo induced anemia  3. Anxiety  4. Bronchitis  5. ECOG 1      PLAN:      1. Reviewed the laboratory work-up with the patient.  Hemoglobin is improved after stopping the chemotherapy.    2. Chemo induced anemia has resolved  3. Continue to be active encourage the patient.  4. Check CBC with chemo check CA-27-29.  5. Patient is on single agent Herceptin.  Her recent 2D echo in December is normal.  She has 3 more dose of Herceptin and her Ceftin will be stopped after the total 1 year usage in March 2020.  6. Xanax will be continued at the reduced dose of 0.25 mg a day   7. I will see her back in my office in 2 months recheck CBC CMP and CA-27-29 at that time    I have reviewed labs results, imaging, vitals, and medications with the patient today.         Patient verbalized understanding and is in agreement of the above plan.          This report was compiled using Dragon voice recognition software. I have made every effort to proof read this document; however, typographical errors may persist.

## 2020-01-16 LAB — CANCER AG27-29 SERPL-ACNC: 14.8 U/ML (ref 0–38.6)

## 2020-01-22 ENCOUNTER — HOSPITAL ENCOUNTER (OUTPATIENT)
Dept: ONCOLOGY | Facility: HOSPITAL | Age: 60
Setting detail: INFUSION SERIES
Discharge: HOME | End: 2020-01-22

## 2020-01-22 ENCOUNTER — TELEPHONE (OUTPATIENT)
Dept: ONCOLOGY | Facility: CLINIC | Age: 60
End: 2020-01-22

## 2020-01-22 VITALS
DIASTOLIC BLOOD PRESSURE: 75 MMHG | HEIGHT: 67 IN | RESPIRATION RATE: 18 BRPM | TEMPERATURE: 98 F | BODY MASS INDEX: 17.27 KG/M2 | SYSTOLIC BLOOD PRESSURE: 135 MMHG | WEIGHT: 110 LBS | HEART RATE: 71 BPM

## 2020-01-22 DIAGNOSIS — C50.912 INFILTRATING DUCTAL CARCINOMA OF LEFT BREAST (HCC): Primary | ICD-10-CM

## 2020-01-22 LAB
BASOPHILS # BLD AUTO: 0.03 10*3/MM3 (ref 0–0.2)
BASOPHILS NFR BLD AUTO: 0.4 % (ref 0–1.5)
DEPRECATED RDW RBC AUTO: 49.8 FL (ref 37–54)
EOSINOPHIL # BLD AUTO: 0.29 10*3/MM3 (ref 0–0.4)
EOSINOPHIL NFR BLD AUTO: 3.9 % (ref 0.3–6.2)
ERYTHROCYTE [DISTWIDTH] IN BLOOD BY AUTOMATED COUNT: 13.7 % (ref 12.3–15.4)
HCT VFR BLD AUTO: 43 % (ref 34–46.6)
HGB BLD-MCNC: 14.2 G/DL (ref 12–15.9)
LYMPHOCYTES # BLD AUTO: 2.38 10*3/MM3 (ref 0.7–3.1)
LYMPHOCYTES NFR BLD AUTO: 31.9 % (ref 19.6–45.3)
MCH RBC QN AUTO: 33.3 PG (ref 26.6–33)
MCHC RBC AUTO-ENTMCNC: 33 G/DL (ref 31.5–35.7)
MCV RBC AUTO: 100.9 FL (ref 79–97)
MONOCYTES # BLD AUTO: 0.72 10*3/MM3 (ref 0.1–0.9)
MONOCYTES NFR BLD AUTO: 9.7 % (ref 5–12)
NEUTROPHILS # BLD AUTO: 4.03 10*3/MM3 (ref 1.7–7)
NEUTROPHILS NFR BLD AUTO: 54.1 % (ref 42.7–76)
PLATELET # BLD AUTO: 140 10*3/MM3 (ref 140–450)
PMV BLD AUTO: 10.8 FL (ref 6–12)
RBC # BLD AUTO: 4.26 10*6/MM3 (ref 3.77–5.28)
WBC NRBC COR # BLD: 7.45 10*3/MM3 (ref 3.4–10.8)

## 2020-01-22 PROCEDURE — 25010000002 TRASTUZUMAB PER 10 MG: Performed by: NURSE PRACTITIONER

## 2020-01-22 PROCEDURE — 36415 COLL VENOUS BLD VENIPUNCTURE: CPT | Performed by: INTERNAL MEDICINE

## 2020-01-22 PROCEDURE — 85025 COMPLETE CBC W/AUTO DIFF WBC: CPT | Performed by: NURSE PRACTITIONER

## 2020-01-22 PROCEDURE — 96413 CHEMO IV INFUSION 1 HR: CPT | Performed by: INTERNAL MEDICINE

## 2020-01-22 RX ORDER — SODIUM CHLORIDE 9 MG/ML
250 INJECTION, SOLUTION INTRAVENOUS ONCE
Status: DISCONTINUED | OUTPATIENT
Start: 2020-01-22 | End: 2020-01-23 | Stop reason: HOSPADM

## 2020-01-22 RX ADMIN — TRASTUZUMAB 300 MG: 150 INJECTION, POWDER, LYOPHILIZED, FOR SOLUTION INTRAVENOUS at 11:01

## 2020-01-22 NOTE — TELEPHONE ENCOUNTER
Benefit review.  Patient has active [employer] coverage.  I discussed a [employer] Herceptin copay card with the patient.  Patient gave permission to enroll her in the [employer] program.  Patient now has an active Herceptin copay card.  ID#  [id number].

## 2020-02-11 DIAGNOSIS — C50.912 INFILTRATING DUCTAL CARCINOMA OF LEFT BREAST (HCC): ICD-10-CM

## 2020-02-11 RX ORDER — SODIUM CHLORIDE 9 MG/ML
250 INJECTION, SOLUTION INTRAVENOUS ONCE
Status: CANCELLED | OUTPATIENT
Start: 2020-03-04

## 2020-02-11 RX ORDER — SODIUM CHLORIDE 9 MG/ML
250 INJECTION, SOLUTION INTRAVENOUS ONCE
Status: CANCELLED | OUTPATIENT
Start: 2020-02-12

## 2020-02-12 ENCOUNTER — HOSPITAL ENCOUNTER (OUTPATIENT)
Dept: ONCOLOGY | Facility: HOSPITAL | Age: 60
Setting detail: INFUSION SERIES
Discharge: HOME | End: 2020-02-12

## 2020-02-12 VITALS
DIASTOLIC BLOOD PRESSURE: 83 MMHG | TEMPERATURE: 98.3 F | RESPIRATION RATE: 18 BRPM | WEIGHT: 110 LBS | HEART RATE: 76 BPM | BODY MASS INDEX: 17.27 KG/M2 | HEIGHT: 67 IN | SYSTOLIC BLOOD PRESSURE: 130 MMHG

## 2020-02-12 DIAGNOSIS — C50.912 INFILTRATING DUCTAL CARCINOMA OF LEFT BREAST (HCC): Primary | ICD-10-CM

## 2020-02-12 LAB
ALBUMIN SERPL-MCNC: 4.3 G/DL (ref 3.5–5.2)
ALBUMIN/GLOB SERPL: 1.6 G/DL
ALP SERPL-CCNC: 66 U/L (ref 39–117)
ALT SERPL W P-5'-P-CCNC: 20 U/L (ref 1–33)
ANION GAP SERPL CALCULATED.3IONS-SCNC: 12 MMOL/L (ref 5–15)
AST SERPL-CCNC: 23 U/L (ref 1–32)
BASOPHILS # BLD AUTO: 0.02 10*3/MM3 (ref 0–0.2)
BASOPHILS NFR BLD AUTO: 0.3 % (ref 0–1.5)
BILIRUB SERPL-MCNC: 0.2 MG/DL (ref 0.2–1.2)
BUN BLD-MCNC: 17 MG/DL (ref 6–20)
BUN/CREAT SERPL: 17.3 (ref 7–25)
CALCIUM SPEC-SCNC: 10 MG/DL (ref 8.6–10.5)
CHLORIDE SERPL-SCNC: 101 MMOL/L (ref 98–107)
CO2 SERPL-SCNC: 26 MMOL/L (ref 22–29)
CREAT BLD-MCNC: 0.98 MG/DL (ref 0.57–1)
DEPRECATED RDW RBC AUTO: 44.7 FL (ref 37–54)
EOSINOPHIL # BLD AUTO: 0.2 10*3/MM3 (ref 0–0.4)
EOSINOPHIL NFR BLD AUTO: 3.4 % (ref 0.3–6.2)
ERYTHROCYTE [DISTWIDTH] IN BLOOD BY AUTOMATED COUNT: 12.9 % (ref 12.3–15.4)
GFR SERPL CREATININE-BSD FRML MDRD: 58 ML/MIN/1.73
GLOBULIN UR ELPH-MCNC: 2.7 GM/DL
GLUCOSE BLD-MCNC: 85 MG/DL (ref 65–99)
HCT VFR BLD AUTO: 41.5 % (ref 34–46.6)
HGB BLD-MCNC: 14 G/DL (ref 12–15.9)
LYMPHOCYTES # BLD AUTO: 1.54 10*3/MM3 (ref 0.7–3.1)
LYMPHOCYTES NFR BLD AUTO: 26.1 % (ref 19.6–45.3)
MCH RBC QN AUTO: 32.6 PG (ref 26.6–33)
MCHC RBC AUTO-ENTMCNC: 33.7 G/DL (ref 31.5–35.7)
MCV RBC AUTO: 96.7 FL (ref 79–97)
MONOCYTES # BLD AUTO: 0.61 10*3/MM3 (ref 0.1–0.9)
MONOCYTES NFR BLD AUTO: 10.3 % (ref 5–12)
NEUTROPHILS # BLD AUTO: 3.53 10*3/MM3 (ref 1.7–7)
NEUTROPHILS NFR BLD AUTO: 59.9 % (ref 42.7–76)
PLATELET # BLD AUTO: 155 10*3/MM3 (ref 140–450)
PMV BLD AUTO: 9.7 FL (ref 6–12)
POTASSIUM BLD-SCNC: 4.3 MMOL/L (ref 3.5–5.2)
PROT SERPL-MCNC: 7 G/DL (ref 6–8.5)
RBC # BLD AUTO: 4.29 10*6/MM3 (ref 3.77–5.28)
SODIUM BLD-SCNC: 139 MMOL/L (ref 136–145)
WBC NRBC COR # BLD: 5.9 10*3/MM3 (ref 3.4–10.8)

## 2020-02-12 PROCEDURE — 36415 COLL VENOUS BLD VENIPUNCTURE: CPT

## 2020-02-12 PROCEDURE — 80053 COMPREHEN METABOLIC PANEL: CPT | Performed by: NURSE PRACTITIONER

## 2020-02-12 PROCEDURE — 25010000002 TRASTUZUMAB PER 10 MG: Performed by: NURSE PRACTITIONER

## 2020-02-12 PROCEDURE — 85025 COMPLETE CBC W/AUTO DIFF WBC: CPT | Performed by: NURSE PRACTITIONER

## 2020-02-12 PROCEDURE — 96413 CHEMO IV INFUSION 1 HR: CPT

## 2020-02-12 RX ORDER — SODIUM CHLORIDE 9 MG/ML
250 INJECTION, SOLUTION INTRAVENOUS ONCE
Status: DISCONTINUED | OUTPATIENT
Start: 2020-02-12 | End: 2020-02-13 | Stop reason: HOSPADM

## 2020-02-12 RX ADMIN — TRASTUZUMAB 300 MG: 150 INJECTION, POWDER, LYOPHILIZED, FOR SOLUTION INTRAVENOUS at 11:39

## 2020-02-12 NOTE — PROGRESS NOTES
Pt c/o leg cramps that wake her up at night, about every other night. To relieve symptoms, pt uses icy hot spray on legs & then walks around her house. She says it takes about 30 min of walking around house, for cramps to go away. Pt says leg cramps have gotten worse over last 2 weeks. Will check cmp results & continue to monitor.

## 2020-03-04 ENCOUNTER — HOSPITAL ENCOUNTER (OUTPATIENT)
Dept: ONCOLOGY | Facility: HOSPITAL | Age: 60
Setting detail: INFUSION SERIES
Discharge: HOME | End: 2020-03-04

## 2020-03-04 VITALS
DIASTOLIC BLOOD PRESSURE: 83 MMHG | HEART RATE: 102 BPM | TEMPERATURE: 98.2 F | BODY MASS INDEX: 17.42 KG/M2 | WEIGHT: 111 LBS | HEIGHT: 67 IN | SYSTOLIC BLOOD PRESSURE: 172 MMHG | RESPIRATION RATE: 18 BRPM

## 2020-03-04 DIAGNOSIS — C50.912 INFILTRATING DUCTAL CARCINOMA OF LEFT BREAST (HCC): Primary | ICD-10-CM

## 2020-03-04 LAB
ALBUMIN SERPL-MCNC: 4.1 G/DL (ref 3.5–5.2)
ALBUMIN/GLOB SERPL: 1.6 G/DL
ALP SERPL-CCNC: 67 U/L (ref 39–117)
ALT SERPL W P-5'-P-CCNC: 26 U/L (ref 1–33)
ANION GAP SERPL CALCULATED.3IONS-SCNC: 11 MMOL/L (ref 5–15)
AST SERPL-CCNC: 28 U/L (ref 1–32)
BASOPHILS # BLD AUTO: 0.03 10*3/MM3 (ref 0–0.2)
BASOPHILS NFR BLD AUTO: 0.5 % (ref 0–1.5)
BILIRUB SERPL-MCNC: 0.2 MG/DL (ref 0.2–1.2)
BUN BLD-MCNC: 24 MG/DL (ref 6–20)
BUN/CREAT SERPL: 23.3 (ref 7–25)
CALCIUM SPEC-SCNC: 9.6 MG/DL (ref 8.6–10.5)
CHLORIDE SERPL-SCNC: 103 MMOL/L (ref 98–107)
CO2 SERPL-SCNC: 25 MMOL/L (ref 22–29)
CREAT BLD-MCNC: 1.03 MG/DL (ref 0.57–1)
DEPRECATED RDW RBC AUTO: 44.1 FL (ref 37–54)
EOSINOPHIL # BLD AUTO: 0.22 10*3/MM3 (ref 0–0.4)
EOSINOPHIL NFR BLD AUTO: 3.6 % (ref 0.3–6.2)
ERYTHROCYTE [DISTWIDTH] IN BLOOD BY AUTOMATED COUNT: 12.6 % (ref 12.3–15.4)
GFR SERPL CREATININE-BSD FRML MDRD: 55 ML/MIN/1.73
GLOBULIN UR ELPH-MCNC: 2.5 GM/DL
GLUCOSE BLD-MCNC: 82 MG/DL (ref 65–99)
HCT VFR BLD AUTO: 39.3 % (ref 34–46.6)
HGB BLD-MCNC: 13.2 G/DL (ref 12–15.9)
LYMPHOCYTES # BLD AUTO: 1.98 10*3/MM3 (ref 0.7–3.1)
LYMPHOCYTES NFR BLD AUTO: 32.5 % (ref 19.6–45.3)
MCH RBC QN AUTO: 32.8 PG (ref 26.6–33)
MCHC RBC AUTO-ENTMCNC: 33.6 G/DL (ref 31.5–35.7)
MCV RBC AUTO: 97.8 FL (ref 79–97)
MONOCYTES # BLD AUTO: 0.62 10*3/MM3 (ref 0.1–0.9)
MONOCYTES NFR BLD AUTO: 10.2 % (ref 5–12)
NEUTROPHILS # BLD AUTO: 3.24 10*3/MM3 (ref 1.7–7)
NEUTROPHILS NFR BLD AUTO: 53.2 % (ref 42.7–76)
PLATELET # BLD AUTO: 160 10*3/MM3 (ref 140–450)
PMV BLD AUTO: 9.8 FL (ref 6–12)
POTASSIUM BLD-SCNC: 4.4 MMOL/L (ref 3.5–5.2)
PROT SERPL-MCNC: 6.6 G/DL (ref 6–8.5)
RBC # BLD AUTO: 4.02 10*6/MM3 (ref 3.77–5.28)
SODIUM BLD-SCNC: 139 MMOL/L (ref 136–145)
WBC NRBC COR # BLD: 6.09 10*3/MM3 (ref 3.4–10.8)

## 2020-03-04 PROCEDURE — 85025 COMPLETE CBC W/AUTO DIFF WBC: CPT | Performed by: NURSE PRACTITIONER

## 2020-03-04 PROCEDURE — 36415 COLL VENOUS BLD VENIPUNCTURE: CPT

## 2020-03-04 PROCEDURE — 25010000002 TRASTUZUMAB PER 10 MG: Performed by: NURSE PRACTITIONER

## 2020-03-04 PROCEDURE — 96413 CHEMO IV INFUSION 1 HR: CPT

## 2020-03-04 PROCEDURE — 80053 COMPREHEN METABOLIC PANEL: CPT | Performed by: NURSE PRACTITIONER

## 2020-03-04 RX ORDER — HEPARIN SODIUM (PORCINE) LOCK FLUSH IV SOLN 100 UNIT/ML 100 UNIT/ML
500 SOLUTION INTRAVENOUS AS NEEDED
Status: DISCONTINUED | OUTPATIENT
Start: 2020-03-04 | End: 2020-03-05 | Stop reason: HOSPADM

## 2020-03-04 RX ORDER — SODIUM CHLORIDE 9 MG/ML
250 INJECTION, SOLUTION INTRAVENOUS ONCE
Status: COMPLETED | OUTPATIENT
Start: 2020-03-04 | End: 2020-03-04

## 2020-03-04 RX ORDER — HEPARIN SODIUM (PORCINE) LOCK FLUSH IV SOLN 100 UNIT/ML 100 UNIT/ML
500 SOLUTION INTRAVENOUS AS NEEDED
OUTPATIENT
Start: 2020-03-04

## 2020-03-04 RX ORDER — SODIUM CHLORIDE 0.9 % (FLUSH) 0.9 %
10 SYRINGE (ML) INJECTION AS NEEDED
Status: DISCONTINUED | OUTPATIENT
Start: 2020-03-04 | End: 2020-03-05 | Stop reason: HOSPADM

## 2020-03-04 RX ORDER — SODIUM CHLORIDE 0.9 % (FLUSH) 0.9 %
10 SYRINGE (ML) INJECTION AS NEEDED
OUTPATIENT
Start: 2020-03-04

## 2020-03-04 RX ADMIN — TRASTUZUMAB 300 MG: 150 INJECTION, POWDER, LYOPHILIZED, FOR SOLUTION INTRAVENOUS at 11:23

## 2020-03-04 RX ADMIN — SODIUM CHLORIDE 250 ML: 900 INJECTION, SOLUTION INTRAVENOUS at 11:27

## 2020-03-06 ENCOUNTER — HOSPITAL ENCOUNTER (OUTPATIENT)
Dept: CARDIOLOGY | Facility: HOSPITAL | Age: 60
Discharge: HOME | End: 2020-03-06
Admitting: NURSE PRACTITIONER

## 2020-03-06 VITALS
BODY MASS INDEX: 17.42 KG/M2 | SYSTOLIC BLOOD PRESSURE: 132 MMHG | WEIGHT: 111 LBS | HEIGHT: 67 IN | DIASTOLIC BLOOD PRESSURE: 77 MMHG

## 2020-03-06 DIAGNOSIS — C50.919 INFILTRATING DUCTAL CARCINOMA OF BREAST, UNSPECIFIED LATERALITY (HCC): ICD-10-CM

## 2020-03-06 PROCEDURE — 93306 TTE W/DOPPLER COMPLETE: CPT

## 2020-03-06 PROCEDURE — 93356 MYOCRD STRAIN IMG SPCKL TRCK: CPT

## 2020-03-07 LAB
BH CV ECHO MEAS - ACS: 1.7 CM
BH CV ECHO MEAS - AO MAX PG (FULL): 0.05 MMHG
BH CV ECHO MEAS - AO MAX PG: 4 MMHG
BH CV ECHO MEAS - AO MEAN PG (FULL): 0.52 MMHG
BH CV ECHO MEAS - AO MEAN PG: 2.3 MMHG
BH CV ECHO MEAS - AO ROOT AREA (BSA CORRECTED): 1.7
BH CV ECHO MEAS - AO ROOT AREA: 5.6 CM^2
BH CV ECHO MEAS - AO ROOT DIAM: 2.7 CM
BH CV ECHO MEAS - AO V2 MAX: 99.7 CM/SEC
BH CV ECHO MEAS - AO V2 MEAN: 72.9 CM/SEC
BH CV ECHO MEAS - AO V2 VTI: 24.4 CM
BH CV ECHO MEAS - AORTIC HR: 54.6 BPM
BH CV ECHO MEAS - AORTIC R-R: 1.1 SEC
BH CV ECHO MEAS - ASC AORTA: 2.4 CM
BH CV ECHO MEAS - AVA(I,A): 2.5 CM^2
BH CV ECHO MEAS - AVA(I,D): 2.5 CM^2
BH CV ECHO MEAS - AVA(V,A): 2.7 CM^2
BH CV ECHO MEAS - AVA(V,D): 2.7 CM^2
BH CV ECHO MEAS - BSA(HAYCOCK): 1.5 M^2
BH CV ECHO MEAS - BSA: 1.6 M^2
BH CV ECHO MEAS - BZI_BMI: 17.4 KILOGRAMS/M^2
BH CV ECHO MEAS - BZI_METRIC_HEIGHT: 170.2 CM
BH CV ECHO MEAS - BZI_METRIC_WEIGHT: 50.3 KG
BH CV ECHO MEAS - CI(AO): 4.7 L/MIN/M^2
BH CV ECHO MEAS - CI(LVOT): 2.2 L/MIN/M^2
BH CV ECHO MEAS - CO(AO): 7.4 L/MIN
BH CV ECHO MEAS - CO(LVOT): 3.4 L/MIN
BH CV ECHO MEAS - EDV(CUBED): 72.1 ML
BH CV ECHO MEAS - EDV(MOD-SP4): 56.1 ML
BH CV ECHO MEAS - EDV(TEICH): 76.9 ML
BH CV ECHO MEAS - EF(CUBED): 64.8 %
BH CV ECHO MEAS - EF(MOD-BP): 55 %
BH CV ECHO MEAS - EF(MOD-SP4): 55.4 %
BH CV ECHO MEAS - EF(TEICH): 56.8 %
BH CV ECHO MEAS - ESV(CUBED): 25.4 ML
BH CV ECHO MEAS - ESV(MOD-SP4): 25 ML
BH CV ECHO MEAS - ESV(TEICH): 33.2 ML
BH CV ECHO MEAS - FS: 29.4 %
BH CV ECHO MEAS - IVS/LVPW: 0.63
BH CV ECHO MEAS - IVSD: 0.6 CM
BH CV ECHO MEAS - LA DIMENSION(2D): 3.2 CM
BH CV ECHO MEAS - LV DIASTOLIC VOL/BSA (35-75): 35.6 ML/M^2
BH CV ECHO MEAS - LV MASS(C)D: 97 GRAMS
BH CV ECHO MEAS - LV MASS(C)DI: 61.6 GRAMS/M^2
BH CV ECHO MEAS - LV MAX PG: 3.9 MMHG
BH CV ECHO MEAS - LV MEAN PG: 1.8 MMHG
BH CV ECHO MEAS - LV SYSTOLIC VOL/BSA (12-30): 15.9 ML/M^2
BH CV ECHO MEAS - LV V1 MAX: 99 CM/SEC
BH CV ECHO MEAS - LV V1 MEAN: 60.3 CM/SEC
BH CV ECHO MEAS - LV V1 VTI: 22.5 CM
BH CV ECHO MEAS - LVIDD: 4.2 CM
BH CV ECHO MEAS - LVIDS: 2.9 CM
BH CV ECHO MEAS - LVOT AREA: 2.8 CM^2
BH CV ECHO MEAS - LVOT DIAM: 1.9 CM
BH CV ECHO MEAS - LVPWD: 0.96 CM
BH CV ECHO MEAS - MV A MAX VEL: 68.9 CM/SEC
BH CV ECHO MEAS - MV DEC SLOPE: 553.8 CM/SEC^2
BH CV ECHO MEAS - MV DEC TIME: 0.16 SEC
BH CV ECHO MEAS - MV E MAX VEL: 87.7 CM/SEC
BH CV ECHO MEAS - MV E/A: 1.3
BH CV ECHO MEAS - MV MAX PG: 4.1 MMHG
BH CV ECHO MEAS - MV MEAN PG: 1.1 MMHG
BH CV ECHO MEAS - MV V2 MAX: 101.6 CM/SEC
BH CV ECHO MEAS - MV V2 MEAN: 47.3 CM/SEC
BH CV ECHO MEAS - MV V2 VTI: 29.6 CM
BH CV ECHO MEAS - MVA(VTI): 2.1 CM^2
BH CV ECHO MEAS - PA ACC TIME: 0.15 SEC
BH CV ECHO MEAS - PA MAX PG (FULL): 0.25 MMHG
BH CV ECHO MEAS - PA MAX PG: 2.8 MMHG
BH CV ECHO MEAS - PA MEAN PG (FULL): 0.19 MMHG
BH CV ECHO MEAS - PA MEAN PG: 1.6 MMHG
BH CV ECHO MEAS - PA PR(ACCEL): 10.5 MMHG
BH CV ECHO MEAS - PA V2 MAX: 84.2 CM/SEC
BH CV ECHO MEAS - PA V2 MEAN: 60.5 CM/SEC
BH CV ECHO MEAS - PA V2 VTI: 19.6 CM
BH CV ECHO MEAS - PULM A REVS DUR: 0.11 SEC
BH CV ECHO MEAS - PULM A REVS VEL: 29 CM/SEC
BH CV ECHO MEAS - PULM DIAS VEL: 48.4 CM/SEC
BH CV ECHO MEAS - PULM S/D: 1.2
BH CV ECHO MEAS - PULM SYS VEL: 57.5 CM/SEC
BH CV ECHO MEAS - PVA(I,A): 2.3 CM^2
BH CV ECHO MEAS - PVA(I,D): 2.3 CM^2
BH CV ECHO MEAS - PVA(V,A): 2 CM^2
BH CV ECHO MEAS - PVA(V,D): 2 CM^2
BH CV ECHO MEAS - QP/QS: 0.71
BH CV ECHO MEAS - RAP SYSTOLE: 3 MMHG
BH CV ECHO MEAS - RV MAX PG: 2.6 MMHG
BH CV ECHO MEAS - RV MEAN PG: 1.5 MMHG
BH CV ECHO MEAS - RV V1 MAX: 80.4 CM/SEC
BH CV ECHO MEAS - RV V1 MEAN: 57.1 CM/SEC
BH CV ECHO MEAS - RV V1 VTI: 20.9 CM
BH CV ECHO MEAS - RVDD: 2 CM
BH CV ECHO MEAS - RVOT AREA: 2.1 CM^2
BH CV ECHO MEAS - RVOT DIAM: 1.6 CM
BH CV ECHO MEAS - RVSP: 10.9 MMHG
BH CV ECHO MEAS - SI(AO): 86.2 ML/M^2
BH CV ECHO MEAS - SI(CUBED): 29.7 ML/M^2
BH CV ECHO MEAS - SI(LVOT): 39.4 ML/M^2
BH CV ECHO MEAS - SI(MOD-SP4): 19.7 ML/M^2
BH CV ECHO MEAS - SI(TEICH): 27.7 ML/M^2
BH CV ECHO MEAS - SV(AO): 135.8 ML
BH CV ECHO MEAS - SV(CUBED): 46.7 ML
BH CV ECHO MEAS - SV(LVOT): 62.1 ML
BH CV ECHO MEAS - SV(MOD-SP4): 31 ML
BH CV ECHO MEAS - SV(RVOT): 44.1 ML
BH CV ECHO MEAS - SV(TEICH): 43.6 ML
BH CV ECHO MEAS - TR MAX VEL: 140.1 CM/SEC
LV EF 2D ECHO EST: 55 %
MAXIMAL PREDICTED HEART RATE: 163 BPM
STRESS TARGET HR: 139 BPM

## 2020-03-07 PROCEDURE — 93356 MYOCRD STRAIN IMG SPCKL TRCK: CPT | Performed by: INTERNAL MEDICINE

## 2020-03-07 PROCEDURE — 93306 TTE W/DOPPLER COMPLETE: CPT | Performed by: INTERNAL MEDICINE

## 2020-03-17 NOTE — PROGRESS NOTES
Hematology/Oncology Outpatient Follow Up    Lisa Magallanes  1960    Primary Care Physician: Tin Eli MD  Referring Physician: Tin Eli MD  Chief complaint:  Left breast pT1b, pN0, Mx infiltrating ductal carcinoma, ER negative, CA negative and HER-2/martha positive.   History of Present Illness:   · Ms. Magallanes reports her sister was diagnosed with breast cancer in  2017.  Patient never had any mammograms done on herself and had her initial one done in November 2018.    · 11/6/18 - Bilateral screening mammogram showed left breast global asymmetry within the upper half of the left breast and right breast focal asymmetry within the lower inner quadrant.This followed up with initial biopsy of the right breast mass.    · 12/12/18 - CT-guided biopsy of the right breast mass at 4 o’clock, which is 4 to 5 cm from the  nipple, were fragments of hyalinized fibroadenoma, negative for carcinoma in situ and invasive carcinoma.     · 12/12/18 - On the left breast stereotactic biopsy of the calcifications, benign fibrofatty breast tissue.  No malignancy.  Another biopsy, specimen 2, of calcifications was positive for DCIS, high nuclear grade, solid type with central necrosis.  No invasive malignancy was identified.  Tumor was ER negative, CA negative.  · 1/29/19 - MRI of the breast bilaterally showed 7 x 6 mm medial to the biopsy clip of the left breast at the biopsy proven DCIS.  Could be residual malignancy near the post biopsy change at the site of recently diagnosed DCIS in the left breast.  An 11 mm mass concerning for invasive malignancy and three areas of focal non-mass enhancement in the upper outer and lower outer quadrants of the left breast.  No MR signs of malignancy in the right breast.     · 2/5/19 - MRI biopsy of the left breast mass at 6 o'clock position, invasive poorly differentiated mammary carcinoma with Lopez grade of 8/9.  No in situ carcinoma was identified.  Tumor ER  negative, DC negative and HER-2/martha testing was positive.   · Patient was sent to the [facility] and seen initially on 19.   · 19 - Left breast mastectomy, invasive poorly differentiated ductal carcinoma, 8 mm, pT1b with associated high grade DCIS.  Margins negative. One benign sentinel lymph node.   [place] score.  DCIS present 7 mm. Tumor ER negative, DC negative, HER-2/martha positive.    · 3/18/19 - Adjuvant chemotherapy with Taxotere, Carboplatin and Herceptin was initiated.    · 3/20/19 - PET/CT scan with no evidence of metastatic disease within the neck, chest, abdomen or  pelvis.  There is cholelithiasis.    · 7/10/2019 patient completed 6 cycles of adjuvant TCH   · 2019 patient initiated on single agent Herceptin   · 2019 2D echo- Left ventricular systolic function is normal. LV ejection fraction is about 60%  · 2019 2D echo shows ejection fraction of 50% with left ventricular systolic function is normal  · 1/15/2020 - CA 27.29  14.8.  · 3/4/2020 last dose of trastuzumab.  · 3/6/2020 2D echo estimated ejection fraction 55%.  Left ventricular systolic function is normal.  Left atrial cavity size is mild to moderately dilated.      Past Medical History:   Diagnosis Date   • Abnormal TSH    • Allergic rhinitis    • Annual visit for general adult medical examination with abnormal findings 2019   • Breast cancer (CMS/HCC)     left   • Carpal tunnel syndrome    • Cytopenia     Chemo-induced cytopenia   • Ductal carcinoma in situ (DCIS) of left breast    • Fatigue     resolved   • Fibroadenoma    • GERD (gastroesophageal reflux disease)    • Hyperlipidemia    • Sleep disorder    • Tobacco use 2019   • Underweight 2019       Past Surgical History:   Procedure Laterality Date   •  SECTION     •  SECTION     • MASTECTOMY Left 2019   • POSTPARTUM TUBAL LIGATION     • TUBAL ABDOMINAL LIGATION     • VEIN LIGATION     • VENOUS ACCESS  DEVICE (PORT) INSERTION Right 2019   • VENOUS ACCESS DEVICE (PORT) REMOVAL Right 2019    Procedure: REMOVAL VENOUS ACCESS DEVICE;  Surgeon: Levy Mayfield MD;  Location: [facility] OR;  Service: General         Current Outpatient Medications:   •  Acetaminophen 325 MG capsule, TYLENOL CAPS prn, Disp: , Rfl:   •  Trastuzumab (HERCEPTIN IV), Infuse 1 dose into a venous catheter Every 30 (Thirty) Days. Takes every 3 weeks, Disp: , Rfl:   •  ALPRAZolam (XANAX) 0.25 MG tablet, Take 1 tablet by mouth Daily. (Patient taking differently: Take 0.25 mg by mouth Every Night. ONLY HALF A TABLET), Disp: 30 tablet, Rfl: 3  •  vitamin B-12 (CYANOCOBALAMIN) 100 MCG tablet, Take 250 mcg by mouth Daily., Disp: , Rfl:     Allergies   Allergen Reactions   • Morphine Palpitations       Family History   Problem Relation Age of Onset   • Breast cancer Sister 58       Cancer-related family history includes Breast cancer (age of onset: 58) in her sister.    Social History     Tobacco Use   • Smoking status: Former Smoker     Packs/day: 0.00     Years: 10.00     Pack years: 0.00     Types: Cigarettes     Start date:      Last attempt to quit: 2019     Years since quittin.3   • Smokeless tobacco: Never Used   Substance Use Topics   • Alcohol use: No     Frequency: Never   • Drug use: No       I have reviewed the history of present illness, past medical history, family history, social history, lab results, all notes and other records since the patient was last seen at the cancer care center.    SUBJECTIVE:      Patient in my office for follow-up during chemotherapy.  Patient completed Herceptin.  Anxiety is controlled without the Xanax.  She is cut off diet sodas also.  Has seasonal allergies.      ROS:       Review of Systems   Constitutional: Negative for fever.   HENT: Negative for nosebleeds and trouble swallowing.    Eyes: Negative for visual disturbance.   Respiratory: Negative for cough, shortness of breath and  "wheezing.    Cardiovascular: Negative for chest pain.   Gastrointestinal: Negative for abdominal pain and blood in stool.   Endocrine: Negative for cold intolerance.   Genitourinary: Negative for dysuria and hematuria.   Musculoskeletal: Negative for joint swelling.   Skin: Negative for rash.   Allergic/Immunologic: Negative for environmental allergies.   Neurological: Negative for seizures.   Hematological: Does not bruise/bleed easily.   Psychiatric/Behavioral: The patient is not nervous/anxious.          Objective:       Vitals:    03/18/20 0958   BP: 148/88   Pulse: 94   Resp: 18   Temp: 98.6 °F (37 °C)   TempSrc: Oral   Weight: 49.9 kg (110 lb)   Height: 165.1 cm (65\")   PainSc: 0-No pain         PHYSICAL EXAM:       Physical Exam   Constitutional: She is oriented to person, place, and time. No distress.   Well-built well-nourished   HENT:   Head: Normocephalic and atraumatic.   Eyes: Conjunctivae and EOM are normal. Right eye exhibits no discharge. Left eye exhibits no discharge. No scleral icterus.   Neck: Normal range of motion. Neck supple. No thyromegaly present.   Cardiovascular: Normal rate, regular rhythm and normal heart sounds. Exam reveals no gallop and no friction rub.   Pulmonary/Chest: Effort normal. No stridor. No respiratory distress. She has no wheezes.   Left breast status post mastectomy well-healed scar no scar recurrence  Right breast no palpable masses   Abdominal: Soft. Bowel sounds are normal. She exhibits no mass. There is no tenderness. There is no rebound and no guarding.   Musculoskeletal: Normal range of motion. She exhibits no tenderness.   Lymphadenopathy:     She has no cervical adenopathy.   Neurological: She is alert and oriented to person, place, and time. She exhibits normal muscle tone.   Skin: Skin is warm. No rash noted. She is not diaphoretic. No erythema.   Psychiatric: She has a normal mood and affect. Her behavior is normal.   Nursing note and vitals reviewed.   "     RECENT LABS:     WBC   Date Value Ref Range Status   03/18/2020 6.57 3.40 - 10.80 10*3/mm3 Final     RBC   Date Value Ref Range Status   03/18/2020 4.38 3.77 - 5.28 10*6/mm3 Final   10/29/2018 4.82 3.80 - 5.10 Million/uL Final     Hemoglobin   Date Value Ref Range Status   03/18/2020 14.2 12.0 - 15.9 g/dL Final     Hematocrit   Date Value Ref Range Status   03/18/2020 42.7 34.0 - 46.6 % Final     MCV   Date Value Ref Range Status   03/18/2020 97.5 (H) 79.0 - 97.0 fL Final     MCH   Date Value Ref Range Status   03/18/2020 32.4 26.6 - 33.0 pg Final     MCHC   Date Value Ref Range Status   03/18/2020 33.3 31.5 - 35.7 g/dL Final     RDW   Date Value Ref Range Status   03/18/2020 12.7 12.3 - 15.4 % Final     RDW-SD   Date Value Ref Range Status   03/18/2020 45.0 37.0 - 54.0 fl Final     MPV   Date Value Ref Range Status   03/18/2020 9.5 6.0 - 12.0 fL Final     Platelets   Date Value Ref Range Status   03/18/2020 144 140 - 450 10*3/mm3 Final     Neutrophil %   Date Value Ref Range Status   03/18/2020 55.1 42.7 - 76.0 % Final     Lymphocyte %   Date Value Ref Range Status   03/18/2020 30.9 19.6 - 45.3 % Final     Monocyte %   Date Value Ref Range Status   03/18/2020 9.3 5.0 - 12.0 % Final     Eosinophil %   Date Value Ref Range Status   03/18/2020 4.4 0.3 - 6.2 % Final     Basophil %   Date Value Ref Range Status   03/18/2020 0.3 0.0 - 1.5 % Final     Neutrophils, Absolute   Date Value Ref Range Status   03/18/2020 3.62 1.70 - 7.00 10*3/mm3 Final     Lymphocytes, Absolute   Date Value Ref Range Status   03/18/2020 2.03 0.70 - 3.10 10*3/mm3 Final     Monocytes, Absolute   Date Value Ref Range Status   03/18/2020 0.61 0.10 - 0.90 10*3/mm3 Final     Eosinophils, Absolute   Date Value Ref Range Status   03/18/2020 0.29 0.00 - 0.40 10*3/mm3 Final     Basophils, Absolute   Date Value Ref Range Status   03/18/2020 0.02 0.00 - 0.20 10*3/mm3 Final     nRBC   Date Value Ref Range Status   03/05/2019 0 0 /100[WBCs] Final        Lab Results   Component Value Date    GLUCOSE 82 03/04/2020    BUN 24 (H) 03/04/2020    CREATININE 1.03 (H) 03/04/2020    EGFRIFNONA 55 (L) 03/04/2020    EGFRIFAFRI >60 06/10/2019    BCR 23.3 03/04/2020    K 4.4 03/04/2020    CO2 25.0 03/04/2020    CALCIUM 9.6 03/04/2020    ALBUMIN 4.10 03/04/2020    LABIL2 1.3 06/10/2019    AST 28 03/04/2020    ALT 26 03/04/2020         Assessment/Plan      ASSESSMENT:     1. Left breast pT1b, pN0, Mx infiltrating ductal carcinoma, ER negative, [place] negative and HER-2/martha positive diagnosed in February 2019  2. Anxiety  3. Allergic bronchitis  4. Seasonal allergies  5. ECOG 1      PLAN:      1. Reviewed the laboratory work-up with the patient.  Hemoglobin is improved after stopping the chemotherapy.    2. Instructed her to take Zyrtec for her allergies and bronchitis.  3. Continue to be active encourage the patient.  4. Check CBC with chemo check CA-27-29.  5. I reviewed the 2D echo results with the patient which is normal as before starting Herceptin.  6. Xanax will be discontinued.  7. I will see her back in my office in 3 months recheck CBC CMP and CA-27-29 at that time    I have reviewed labs results, imaging, vitals, and medications with the patient today.       Electronically signed by Damien Moctezuma MD, 03/18/20, 10:26 AM.    Patient verbalized understanding and is in agreement of the above plan.          This report was compiled using Dragon voice recognition software. I have made every effort to proof read this document; however, typographical errors may persist.

## 2020-03-18 ENCOUNTER — OFFICE VISIT (OUTPATIENT)
Dept: LAB | Facility: HOSPITAL | Age: 60
End: 2020-03-18

## 2020-03-18 ENCOUNTER — OFFICE VISIT (OUTPATIENT)
Dept: ONCOLOGY | Facility: CLINIC | Age: 60
End: 2020-03-18

## 2020-03-18 VITALS
BODY MASS INDEX: 18.33 KG/M2 | WEIGHT: 110 LBS | SYSTOLIC BLOOD PRESSURE: 148 MMHG | HEIGHT: 65 IN | HEART RATE: 94 BPM | TEMPERATURE: 98.6 F | RESPIRATION RATE: 18 BRPM | DIASTOLIC BLOOD PRESSURE: 88 MMHG

## 2020-03-18 DIAGNOSIS — C50.912 INFILTRATING DUCTAL CARCINOMA OF LEFT BREAST (HCC): ICD-10-CM

## 2020-03-18 DIAGNOSIS — C50.912 INFILTRATING DUCTAL CARCINOMA OF LEFT BREAST (HCC): Primary | ICD-10-CM

## 2020-03-18 LAB
ALBUMIN SERPL-MCNC: 4.4 G/DL (ref 3.5–5.2)
ALBUMIN/GLOB SERPL: 1.6 G/DL
ALP SERPL-CCNC: 69 U/L (ref 39–117)
ALT SERPL W P-5'-P-CCNC: 24 U/L (ref 1–33)
ANION GAP SERPL CALCULATED.3IONS-SCNC: 11 MMOL/L (ref 5–15)
AST SERPL-CCNC: 25 U/L (ref 1–32)
BASOPHILS # BLD AUTO: 0.02 10*3/MM3 (ref 0–0.2)
BASOPHILS NFR BLD AUTO: 0.3 % (ref 0–1.5)
BILIRUB SERPL-MCNC: 0.3 MG/DL (ref 0.2–1.2)
BUN BLD-MCNC: 16 MG/DL (ref 6–20)
BUN/CREAT SERPL: 14.7 (ref 7–25)
CALCIUM SPEC-SCNC: 10.1 MG/DL (ref 8.6–10.5)
CHLORIDE SERPL-SCNC: 103 MMOL/L (ref 98–107)
CO2 SERPL-SCNC: 29 MMOL/L (ref 22–29)
CREAT BLD-MCNC: 1.09 MG/DL (ref 0.57–1)
DEPRECATED RDW RBC AUTO: 45 FL (ref 37–54)
EOSINOPHIL # BLD AUTO: 0.29 10*3/MM3 (ref 0–0.4)
EOSINOPHIL NFR BLD AUTO: 4.4 % (ref 0.3–6.2)
ERYTHROCYTE [DISTWIDTH] IN BLOOD BY AUTOMATED COUNT: 12.7 % (ref 12.3–15.4)
GFR SERPL CREATININE-BSD FRML MDRD: 52 ML/MIN/1.73
GLOBULIN UR ELPH-MCNC: 2.7 GM/DL
GLUCOSE BLD-MCNC: 107 MG/DL (ref 65–99)
HCT VFR BLD AUTO: 42.7 % (ref 34–46.6)
HGB BLD-MCNC: 14.2 G/DL (ref 12–15.9)
LYMPHOCYTES # BLD AUTO: 2.03 10*3/MM3 (ref 0.7–3.1)
LYMPHOCYTES NFR BLD AUTO: 30.9 % (ref 19.6–45.3)
MCH RBC QN AUTO: 32.4 PG (ref 26.6–33)
MCHC RBC AUTO-ENTMCNC: 33.3 G/DL (ref 31.5–35.7)
MCV RBC AUTO: 97.5 FL (ref 79–97)
MONOCYTES # BLD AUTO: 0.61 10*3/MM3 (ref 0.1–0.9)
MONOCYTES NFR BLD AUTO: 9.3 % (ref 5–12)
NEUTROPHILS # BLD AUTO: 3.62 10*3/MM3 (ref 1.7–7)
NEUTROPHILS NFR BLD AUTO: 55.1 % (ref 42.7–76)
PLATELET # BLD AUTO: 144 10*3/MM3 (ref 140–450)
PMV BLD AUTO: 9.5 FL (ref 6–12)
POTASSIUM BLD-SCNC: 4.2 MMOL/L (ref 3.5–5.2)
PROT SERPL-MCNC: 7.1 G/DL (ref 6–8.5)
RBC # BLD AUTO: 4.38 10*6/MM3 (ref 3.77–5.28)
SODIUM BLD-SCNC: 143 MMOL/L (ref 136–145)
WBC NRBC COR # BLD: 6.57 10*3/MM3 (ref 3.4–10.8)

## 2020-03-18 PROCEDURE — 80053 COMPREHEN METABOLIC PANEL: CPT | Performed by: NURSE PRACTITIONER

## 2020-03-18 PROCEDURE — 36415 COLL VENOUS BLD VENIPUNCTURE: CPT

## 2020-03-18 PROCEDURE — 85025 COMPLETE CBC W/AUTO DIFF WBC: CPT

## 2020-03-18 PROCEDURE — 99214 OFFICE O/P EST MOD 30 MIN: CPT | Performed by: INTERNAL MEDICINE

## 2020-03-19 DIAGNOSIS — Z12.31 BREAST CANCER SCREENING BY MAMMOGRAM: Primary | ICD-10-CM

## 2020-03-19 LAB — CANCER AG27-29 SERPL-ACNC: 12.5 U/ML (ref 0–38.6)

## 2020-03-24 ENCOUNTER — TELEPHONE (OUTPATIENT)
Dept: ONCOLOGY | Facility: CLINIC | Age: 60
End: 2020-03-24

## 2020-03-24 DIAGNOSIS — Z79.899 ENCOUNTER FOR MONITORING CARDIOTOXIC DRUG THERAPY: Primary | ICD-10-CM

## 2020-03-24 DIAGNOSIS — C50.912 INFILTRATING DUCTAL CARCINOMA OF LEFT BREAST (HCC): ICD-10-CM

## 2020-03-24 DIAGNOSIS — Z51.81 ENCOUNTER FOR MONITORING CARDIOTOXIC DRUG THERAPY: Primary | ICD-10-CM

## 2020-03-24 RX ORDER — SODIUM CHLORIDE 9 MG/ML
250 INJECTION, SOLUTION INTRAVENOUS ONCE
Status: CANCELLED | OUTPATIENT
Start: 2020-03-25

## 2020-03-24 NOTE — TELEPHONE ENCOUNTER
----- Message from Shama Boswell RN sent at 3/24/2020  9:02 AM EDT -----  Regarding: Call patient for repeat echo  Please let patient know Judi Batista ordered a repeat echo for patient on 5/29/20.     Electronically signed by Shama Boswell RN, 03/24/20, 9:04 AM.

## 2020-03-24 NOTE — TELEPHONE ENCOUNTER
I called and notified the patient that her next echo will be due on 5/29/20 and that our office would call her with that appointment.  I also requested that she call me back at my direct line for prescreening prior to her infusion tomorrow.

## 2020-03-25 ENCOUNTER — TELEPHONE (OUTPATIENT)
Dept: ONCOLOGY | Facility: HOSPITAL | Age: 60
End: 2020-03-25

## 2020-05-01 ENCOUNTER — APPOINTMENT (OUTPATIENT)
Dept: MAMMOGRAPHY | Facility: HOSPITAL | Age: 60
End: 2020-05-01

## 2020-05-13 ENCOUNTER — HOSPITAL ENCOUNTER (OUTPATIENT)
Dept: MAMMOGRAPHY | Facility: HOSPITAL | Age: 60
Discharge: HOME | End: 2020-05-13
Admitting: NURSE PRACTITIONER

## 2020-05-13 DIAGNOSIS — Z12.31 BREAST CANCER SCREENING BY MAMMOGRAM: ICD-10-CM

## 2020-05-13 PROCEDURE — 77067 SCR MAMMO BI INCL CAD: CPT

## 2020-05-13 PROCEDURE — 77063 BREAST TOMOSYNTHESIS BI: CPT

## 2020-06-17 ENCOUNTER — OFFICE VISIT (OUTPATIENT)
Dept: ONCOLOGY | Facility: CLINIC | Age: 60
End: 2020-06-17

## 2020-06-17 ENCOUNTER — APPOINTMENT (OUTPATIENT)
Dept: LAB | Facility: HOSPITAL | Age: 60
End: 2020-06-17

## 2020-06-17 VITALS
RESPIRATION RATE: 16 BRPM | HEART RATE: 85 BPM | HEIGHT: 65 IN | SYSTOLIC BLOOD PRESSURE: 134 MMHG | BODY MASS INDEX: 18.89 KG/M2 | DIASTOLIC BLOOD PRESSURE: 85 MMHG | WEIGHT: 113.4 LBS | TEMPERATURE: 98.2 F

## 2020-06-17 DIAGNOSIS — C50.912 INFILTRATING DUCTAL CARCINOMA OF LEFT BREAST (HCC): Primary | ICD-10-CM

## 2020-06-17 LAB
ALBUMIN SERPL-MCNC: 4.5 G/DL (ref 3.5–5.2)
ALBUMIN/GLOB SERPL: 1.5 G/DL
ALP SERPL-CCNC: 75 U/L (ref 39–117)
ALT SERPL W P-5'-P-CCNC: 8 U/L (ref 1–33)
ANION GAP SERPL CALCULATED.3IONS-SCNC: 11 MMOL/L (ref 5–15)
AST SERPL-CCNC: 16 U/L (ref 1–32)
BASOPHILS # BLD AUTO: 0.03 10*3/MM3 (ref 0–0.2)
BASOPHILS NFR BLD AUTO: 0.5 % (ref 0–1.5)
BILIRUB SERPL-MCNC: 0.3 MG/DL (ref 0.2–1.2)
BUN BLD-MCNC: 14 MG/DL (ref 6–20)
BUN BLD-MCNC: ABNORMAL MG/DL
BUN/CREAT SERPL: ABNORMAL
CALCIUM SPEC-SCNC: 10.3 MG/DL (ref 8.6–10.5)
CHLORIDE SERPL-SCNC: 99 MMOL/L (ref 98–107)
CO2 SERPL-SCNC: 28 MMOL/L (ref 22–29)
CREAT BLD-MCNC: 1.12 MG/DL (ref 0.57–1)
DEPRECATED RDW RBC AUTO: 45.5 FL (ref 37–54)
EOSINOPHIL # BLD AUTO: 0.21 10*3/MM3 (ref 0–0.4)
EOSINOPHIL NFR BLD AUTO: 3.3 % (ref 0.3–6.2)
ERYTHROCYTE [DISTWIDTH] IN BLOOD BY AUTOMATED COUNT: 13.1 % (ref 12.3–15.4)
GFR SERPL CREATININE-BSD FRML MDRD: 50 ML/MIN/1.73
GLOBULIN UR ELPH-MCNC: 3 GM/DL
GLUCOSE BLD-MCNC: 105 MG/DL (ref 65–99)
HCT VFR BLD AUTO: 43.8 % (ref 34–46.6)
HGB BLD-MCNC: 15.1 G/DL (ref 12–15.9)
LYMPHOCYTES # BLD AUTO: 2.16 10*3/MM3 (ref 0.7–3.1)
LYMPHOCYTES NFR BLD AUTO: 33.7 % (ref 19.6–45.3)
MCH RBC QN AUTO: 33 PG (ref 26.6–33)
MCHC RBC AUTO-ENTMCNC: 34.5 G/DL (ref 31.5–35.7)
MCV RBC AUTO: 95.8 FL (ref 79–97)
MONOCYTES # BLD AUTO: 0.56 10*3/MM3 (ref 0.1–0.9)
MONOCYTES NFR BLD AUTO: 8.7 % (ref 5–12)
NEUTROPHILS # BLD AUTO: 3.45 10*3/MM3 (ref 1.7–7)
NEUTROPHILS NFR BLD AUTO: 53.8 % (ref 42.7–76)
PLATELET # BLD AUTO: 164 10*3/MM3 (ref 140–450)
PMV BLD AUTO: 10.1 FL (ref 6–12)
POTASSIUM BLD-SCNC: 4.3 MMOL/L (ref 3.5–5.2)
PROT SERPL-MCNC: 7.5 G/DL (ref 6–8.5)
RBC # BLD AUTO: 4.57 10*6/MM3 (ref 3.77–5.28)
SODIUM BLD-SCNC: 138 MMOL/L (ref 136–145)
WBC NRBC COR # BLD: 6.41 10*3/MM3 (ref 3.4–10.8)

## 2020-06-17 PROCEDURE — 99214 OFFICE O/P EST MOD 30 MIN: CPT | Performed by: INTERNAL MEDICINE

## 2020-06-17 PROCEDURE — 85025 COMPLETE CBC W/AUTO DIFF WBC: CPT | Performed by: INTERNAL MEDICINE

## 2020-06-17 PROCEDURE — 36415 COLL VENOUS BLD VENIPUNCTURE: CPT | Performed by: INTERNAL MEDICINE

## 2020-06-17 PROCEDURE — 80053 COMPREHEN METABOLIC PANEL: CPT | Performed by: INTERNAL MEDICINE

## 2020-06-17 NOTE — PROGRESS NOTES
Hematology/Oncology Outpatient Follow Up    Lisa Magallanes  1960    Primary Care Physician: Tin Eli MD  Referring Physician: Tin Eli MD  Chief complaint:  Left breast pT1b, pN0, Mx infiltrating ductal carcinoma, ER negative, [place] negative and HER-2/martha positive.   History of Present Illness:   · Ms. Magallanes reports her sister was diagnosed with breast cancer in  2017.  Patient never had any mammograms done on herself and had her initial one done in November 2018.    · 11/6/18 - Bilateral screening mammogram showed left breast global asymmetry within the upper half of the left breast and right breast focal asymmetry within the lower inner quadrant.This followed up with initial biopsy of the right breast mass.    · 12/12/18 - CT-guided biopsy of the right breast mass at 4 o’clock, which is 4 to 5 cm from the  nipple, were fragments of hyalinized fibroadenoma, negative for carcinoma in situ and invasive carcinoma.     · 12/12/18 - On the left breast stereotactic biopsy of the calcifications, benign fibrofatty breast tissue.  No malignancy.  Another biopsy, specimen 2, of calcifications was positive for DCIS, high nuclear grade, solid type with central necrosis.  No invasive malignancy was identified.  Tumor was ER negative, [place] negative.  · 1/29/19 - MRI of the breast bilaterally showed 7 x 6 mm medial to the biopsy clip of the left breast at the biopsy proven DCIS.  Could be residual malignancy near the post biopsy change at the site of recently diagnosed DCIS in the left breast.  An 11 mm mass concerning for invasive malignancy and three areas of focal non-mass enhancement in the upper outer and lower outer quadrants of the left breast.  No MR signs of malignancy in the right breast.     · 2/5/19 - MRI biopsy of the left breast mass at 6 o'clock position, invasive poorly differentiated mammary carcinoma with Lopez grade of 8/9.  No in situ carcinoma was identified.  Tumor ER  negative, [place] negative and HER-2/martha testing was positive.   · Patient was sent to the [facility] and seen initially on 2/12/19.   · 2/18/19 - Left breast mastectomy, invasive poorly differentiated ductal carcinoma, 8 mm, pT1b with associated high grade DCIS.  Margins negative. One benign sentinel lymph node.  8/9 [place] score.  DCIS present 7 mm. Tumor ER negative, [place] negative, HER-2/martha positive.    · 3/18/19 - Adjuvant chemotherapy with Taxotere, Carboplatin and Herceptin was initiated.    · 3/20/19 - PET/CT scan with no evidence of metastatic disease within the neck, chest, abdomen or  pelvis.  There is cholelithiasis.    · 7/10/2019 patient completed 6 cycles of adjuvant TCH   · 8/7/2019 patient initiated on single agent Herceptin   · 9/11/2019 2D echo- Left ventricular systolic function is normal. LV ejection fraction is about 60%  · 12/12/2019 2D echo shows ejection fraction of 50% with left ventricular systolic function is normal  · 1/15/2020 - CA 27.29  14.8.  · 3/4/2020 last dose of trastuzumab.  · 3/6/2020 2D echo estimated ejection fraction 55%.  Left ventricular systolic function is normal.  Left atrial cavity size is mild to moderately dilated.  · 5/13/2020-right breast screening mammogram 1. Recommend patient continue with annual unilateral right breast screening mammography and continued clinical breast exams. 2. Due to personal and family history of breast cancer, as well as dense fibroglandular tissue, also recommend risk assessment and possible high risk screening breast MRI, as clinically indicated.      Past Medical History:   Diagnosis Date   • Abnormal TSH    • Allergic rhinitis    • Annual visit for general adult medical examination with abnormal findings 9/30/2019   • Breast cancer (CMS/HCC)     left   • Carpal tunnel syndrome    • Cytopenia     Chemo-induced cytopenia   • Ductal carcinoma in situ (DCIS) of left breast    • Fatigue     resolved   • Fibroadenoma    • GERD  (gastroesophageal reflux disease)    • Hyperlipidemia    • Sleep disorder    • Tobacco use 2019   • Underweight 2019       Past Surgical History:   Procedure Laterality Date   • BREAST BIOPSY Left 2018    stereo and core   • BREAST BIOPSY Right 2018    x 2   •  SECTION     •  SECTION     • MASTECTOMY Left 2019   • POSTPARTUM TUBAL LIGATION     • TUBAL ABDOMINAL LIGATION     • VEIN LIGATION     • VENOUS ACCESS DEVICE (PORT) INSERTION Right 2019   • VENOUS ACCESS DEVICE (PORT) REMOVAL Right 2019    Procedure: REMOVAL VENOUS ACCESS DEVICE;  Surgeon: Levy Mayfield MD;  Location: [facility] MAIN OR;  Service: General         Current Outpatient Medications:   •  Acetaminophen 325 MG capsule, TYLENOL CAPS prn, Disp: , Rfl:   •  ALPRAZolam (XANAX) 0.25 MG tablet, Take 1 tablet by mouth Daily. (Patient taking differently: Take 0.25 mg by mouth Every Night. ONLY HALF A TABLET), Disp: 30 tablet, Rfl: 3  •  Trastuzumab (HERCEPTIN IV), Infuse 1 dose into a venous catheter Every 30 (Thirty) Days. Takes every 3 weeks, Disp: , Rfl:   •  vitamin B-12 (CYANOCOBALAMIN) 100 MCG tablet, Take 250 mcg by mouth Daily., Disp: , Rfl:     Allergies   Allergen Reactions   • Morphine Palpitations       Family History   Problem Relation Age of Onset   • Breast cancer Sister 58       Cancer-related family history includes Breast cancer (age of onset: 58) in her sister.    Social History     Tobacco Use   • Smoking status: Former Smoker     Packs/day: 0.00     Years: 10.00     Pack years: 0.00     Types: Cigarettes     Start date:      Last attempt to quit: 2019     Years since quittin.6   • Smokeless tobacco: Never Used   Substance Use Topics   • Alcohol use: No     Frequency: Never   • Drug use: No       I have reviewed the history of present illness, past medical history, family history, social history, lab results, all notes and other records since the patient was last seen at the cancer  "care center.    SUBJECTIVE:      Patient in my office for follow her left breast cancer.     Anxiety is controlled without the Xanax.  Did not gain or lose weight.  Back to work now able to work full-time as before the cancer diagnosis   ROS:       Review of Systems   Constitutional: Negative for fever.   HENT: Negative for nosebleeds and trouble swallowing.    Eyes: Negative for visual disturbance.   Respiratory: Negative for cough, shortness of breath and wheezing.    Cardiovascular: Negative for chest pain.   Gastrointestinal: Negative for abdominal pain and blood in stool.   Endocrine: Negative for cold intolerance.   Genitourinary: Negative for dysuria and hematuria.   Musculoskeletal: Negative for joint swelling.   Skin: Negative for rash.   Allergic/Immunologic: Negative for environmental allergies.   Neurological: Negative for seizures.   Hematological: Does not bruise/bleed easily.   Psychiatric/Behavioral: The patient is not nervous/anxious.        MD performed ROS and are negative except as mentioned in Subjective.    Objective:       Vitals:    06/17/20 1009   BP: 134/85   Pulse: 85   Resp: 16   Temp: 98.2 °F (36.8 °C)   Weight: 51.4 kg (113 lb 6.4 oz)   Height: 165.1 cm (65\")   PainSc: 0-No pain         PHYSICAL EXAM:       Physical Exam   Constitutional: She is oriented to person, place, and time. No distress.   Well-built well-nourished   HENT:   Head: Normocephalic and atraumatic.   Eyes: Conjunctivae and EOM are normal. Right eye exhibits no discharge. Left eye exhibits no discharge. No scleral icterus.   Neck: Normal range of motion. Neck supple. No thyromegaly present.   Cardiovascular: Normal rate, regular rhythm and normal heart sounds. Exam reveals no gallop and no friction rub.   Pulmonary/Chest: Effort normal. No stridor. No respiratory distress. She has no wheezes.   Left breast status post mastectomy well-healed scar no scar recurrence  Right breast no palpable masses   Abdominal: Soft. " Bowel sounds are normal. She exhibits no mass. There is no tenderness. There is no rebound and no guarding.   Musculoskeletal: Normal range of motion. She exhibits no tenderness.   Lymphadenopathy:     She has no cervical adenopathy.   Neurological: She is alert and oriented to person, place, and time. She exhibits normal muscle tone.   Skin: Skin is warm. No rash noted. She is not diaphoretic. No erythema.   Psychiatric: She has a normal mood and affect. Her behavior is normal.   Nursing note and vitals reviewed.       RECENT LABS:     WBC   Date Value Ref Range Status   06/17/2020 6.41 3.40 - 10.80 10*3/mm3 Final     RBC   Date Value Ref Range Status   06/17/2020 4.57 3.77 - 5.28 10*6/mm3 Final   10/29/2018 4.82 3.80 - 5.10 Million/uL Final     Hemoglobin   Date Value Ref Range Status   06/17/2020 15.1 12.0 - 15.9 g/dL Final     Hematocrit   Date Value Ref Range Status   06/17/2020 43.8 34.0 - 46.6 % Final     MCV   Date Value Ref Range Status   06/17/2020 95.8 79.0 - 97.0 fL Final     MCH   Date Value Ref Range Status   06/17/2020 33.0 26.6 - 33.0 pg Final     MCHC   Date Value Ref Range Status   06/17/2020 34.5 31.5 - 35.7 g/dL Final     RDW   Date Value Ref Range Status   06/17/2020 13.1 12.3 - 15.4 % Final     RDW-SD   Date Value Ref Range Status   06/17/2020 45.5 37.0 - 54.0 fl Final     MPV   Date Value Ref Range Status   06/17/2020 10.1 6.0 - 12.0 fL Final     Platelets   Date Value Ref Range Status   06/17/2020 164 140 - 450 10*3/mm3 Final     Neutrophil %   Date Value Ref Range Status   06/17/2020 53.8 42.7 - 76.0 % Final     Lymphocyte %   Date Value Ref Range Status   06/17/2020 33.7 19.6 - 45.3 % Final     Monocyte %   Date Value Ref Range Status   06/17/2020 8.7 5.0 - 12.0 % Final     Eosinophil %   Date Value Ref Range Status   06/17/2020 3.3 0.3 - 6.2 % Final     Basophil %   Date Value Ref Range Status   06/17/2020 0.5 0.0 - 1.5 % Final     Neutrophils, Absolute   Date Value Ref Range Status    06/17/2020 3.45 1.70 - 7.00 10*3/mm3 Final     Lymphocytes, Absolute   Date Value Ref Range Status   06/17/2020 2.16 0.70 - 3.10 10*3/mm3 Final     Monocytes, Absolute   Date Value Ref Range Status   06/17/2020 0.56 0.10 - 0.90 10*3/mm3 Final     Eosinophils, Absolute   Date Value Ref Range Status   06/17/2020 0.21 0.00 - 0.40 10*3/mm3 Final     Basophils, Absolute   Date Value Ref Range Status   06/17/2020 0.03 0.00 - 0.20 10*3/mm3 Final     nRBC   Date Value Ref Range Status   03/05/2019 0 0 /100[WBCs] Final       Lab Results   Component Value Date    GLUCOSE 107 (H) 03/18/2020    BUN 16 03/18/2020    CREATININE 1.09 (H) 03/18/2020    EGFRIFNONA 52 (L) 03/18/2020    EGFRIFAFRI >60 06/10/2019    BCR 14.7 03/18/2020    K 4.2 03/18/2020    CO2 29.0 03/18/2020    CALCIUM 10.1 03/18/2020    ALBUMIN 4.40 03/18/2020    LABIL2 1.3 06/10/2019    AST 25 03/18/2020    ALT 24 03/18/2020         Assessment/Plan      ASSESSMENT:     1. Left breast pT1b, pN0, Mx infiltrating ductal carcinoma, ER negative, [place] negative and HER-2/martha positive diagnosed in February 2019  2. Anxiety  3. Ongoing allergic bronchitis  4. Genetic testing  5. ECOG 1      PLAN:      1. Reviewed the laboratory work-up with the patient.  Hemoglobin is improved after stopping the chemotherapy.    2. Patient does not require adjuvant anti-hormonal therapy given that the tumor was ER [place] negative  3. Take Zyrtec for her allergies and bronchitis.  4. Continue to be active encourage the patient.  5. Check CBC with chemo check CA-27-29.  6. I reviewed the 2D echo results with the patient which is normal as before starting Herceptin.  7. Xanax will be discontinued.  8. I will see her back in my office in 3 months recheck CBC CMP and CA-27-29 at that time    I have reviewed labs results, imaging, vitals, and medications with the patient today.       Electronically signed by Damien Moctezuma MD, 06/17/20, 10:47 AM.  .    Patient verbalized understanding and is  in agreement of the above plan.          This report was compiled using Dragon voice recognition software. I have made every effort to proof read this document; however, typographical errors may persist.

## 2020-06-26 ENCOUNTER — TELEPHONE (OUTPATIENT)
Dept: ONCOLOGY | Facility: CLINIC | Age: 60
End: 2020-06-26

## 2020-07-01 ENCOUNTER — LAB (OUTPATIENT)
Dept: LAB | Facility: HOSPITAL | Age: 60
End: 2020-07-01

## 2020-07-01 DIAGNOSIS — C50.912 INFILTRATING DUCTAL CARCINOMA OF LEFT BREAST (HCC): ICD-10-CM

## 2020-07-01 PROCEDURE — 36415 COLL VENOUS BLD VENIPUNCTURE: CPT

## 2020-07-02 LAB — CANCER AG27-29 SERPL-ACNC: 22 U/ML (ref 0–38.6)

## 2020-08-10 LAB
CITATION REF LAB TEST: NORMAL
CLINICAL INFO: NORMAL
IMP & REVIEW OF LAB RESULTS: NORMAL
LAB DIRECTOR NAME PROVIDER: NORMAL
Lab: NORMAL
Lab: NORMAL
PREAUTHORIZATION: NORMAL
REASON FOR REFERRAL (NARRATIVE): NORMAL
RECOMMENDATION PATIENT DOC-IMP: NORMAL
REF LAB TEST METHOD: NORMAL
REPORT: NORMAL

## 2020-09-08 ENCOUNTER — TELEPHONE (OUTPATIENT)
Dept: ONCOLOGY | Facility: HOSPITAL | Age: 60
End: 2020-09-08

## 2020-09-08 NOTE — TELEPHONE ENCOUNTER
Pt is calling stating that her heart rate keeps dropping below 40. I advised pt that she needs to go to ER if episode happens again. I also advised her to call her PCP to discuss this and have them see her ASAP. Pt verbalized understanding.

## 2020-09-14 ENCOUNTER — OFFICE VISIT (OUTPATIENT)
Dept: FAMILY MEDICINE CLINIC | Facility: CLINIC | Age: 60
End: 2020-09-14

## 2020-09-14 VITALS
WEIGHT: 114 LBS | OXYGEN SATURATION: 99 % | HEIGHT: 65 IN | HEART RATE: 100 BPM | SYSTOLIC BLOOD PRESSURE: 142 MMHG | BODY MASS INDEX: 18.99 KG/M2 | DIASTOLIC BLOOD PRESSURE: 84 MMHG | RESPIRATION RATE: 18 BRPM | TEMPERATURE: 97.3 F

## 2020-09-14 DIAGNOSIS — R63.6 UNDERWEIGHT: ICD-10-CM

## 2020-09-14 DIAGNOSIS — R07.89 OTHER CHEST PAIN: Primary | ICD-10-CM

## 2020-09-14 DIAGNOSIS — C50.912 INFILTRATING DUCTAL CARCINOMA OF LEFT BREAST (HCC): ICD-10-CM

## 2020-09-14 DIAGNOSIS — R00.2 PALPITATIONS: ICD-10-CM

## 2020-09-14 DIAGNOSIS — R53.83 MALAISE AND FATIGUE: ICD-10-CM

## 2020-09-14 DIAGNOSIS — Z72.0 TOBACCO USE: ICD-10-CM

## 2020-09-14 DIAGNOSIS — R53.81 MALAISE AND FATIGUE: ICD-10-CM

## 2020-09-14 DIAGNOSIS — E78.2 MIXED HYPERLIPIDEMIA: ICD-10-CM

## 2020-09-14 DIAGNOSIS — D05.12 DUCTAL CARCINOMA IN SITU (DCIS) OF LEFT BREAST: ICD-10-CM

## 2020-09-14 PROCEDURE — 99213 OFFICE O/P EST LOW 20 MIN: CPT | Performed by: FAMILY MEDICINE

## 2020-09-14 NOTE — PROGRESS NOTES
Subjective   Lisa Magallanes is a 60 y.o. female.     Chief Complaint   Patient presents with   • Palpitations       Chest Pain   This is a new problem. The current episode started 1 to 4 weeks ago. The onset quality is gradual. The problem occurs intermittently. The problem has been waxing and waning. Associated symptoms include nausea, palpitations and weakness. Pertinent negatives include no abdominal pain, diaphoresis or shortness of breath. Risk factors include post-menopausal and smoking/tobacco exposure.   Her past medical history is significant for cancer.   Pertinent negatives for past medical history include no seizures.            I personally reviewed and updated the patient's allergies, medications, problem list, and past medical, surgical, social, and family history.     Family History   Problem Relation Age of Onset   • Breast cancer Sister 58       Social History     Tobacco Use   • Smoking status: Current Every Day Smoker     Packs/day: 0.50     Types: Cigarettes     Start date:    • Smokeless tobacco: Never Used   Substance Use Topics   • Alcohol use: No     Frequency: Never   • Drug use: No       Past Surgical History:   Procedure Laterality Date   • BREAST BIOPSY Left 2018    stereo and core   • BREAST BIOPSY Right 2018    x 2   •  SECTION     •  SECTION     • MASTECTOMY Left 2019   • POSTPARTUM TUBAL LIGATION     • TUBAL ABDOMINAL LIGATION     • VEIN LIGATION     • VENOUS ACCESS DEVICE (PORT) INSERTION Right 2019   • VENOUS ACCESS DEVICE (PORT) REMOVAL Right 2019    Procedure: REMOVAL VENOUS ACCESS DEVICE;  Surgeon: Levy Mayfield MD;  Location: [facility];  Service: General       Patient Active Problem List   Diagnosis   • Infiltrating ductal carcinoma of breast (CMS/HCC)   • Ductal carcinoma in situ (DCIS) of left breast   • Generalized osteoarthrosis, unspecified site   • Hyperlipidemia   • Malaise and fatigue   • Infiltrating ductal  "carcinoma of left breast (CMS/HCC)   • Annual visit for general adult medical examination with abnormal findings   • Allergic rhinitis   • Abnormal TSH   • Carpal tunnel syndrome   • Fibroadenoma   • Sleep disorder   • Underweight   • Tobacco use   • Other chest pain       No current outpatient medications on file.         Review of Systems   Constitutional: Negative for chills and diaphoresis.   Eyes: Negative for visual disturbance.   Respiratory: Negative for shortness of breath.    Cardiovascular: Positive for chest pain and palpitations.   Gastrointestinal: Positive for nausea. Negative for abdominal pain.   Endocrine: Negative for polydipsia and polyphagia.   Musculoskeletal: Negative for neck stiffness.   Skin: Negative for color change and pallor.   Neurological: Positive for weakness. Negative for seizures and syncope.   Hematological: Negative for adenopathy.       I have reviewed and confirmed the accuracy of the ROS as documented by the MA/LPN/RN Tin Eli MD      Objective   /84 (BP Location: Right arm, Patient Position: Sitting, Cuff Size: Adult)   Pulse 100   Temp 97.3 °F (36.3 °C) (Temporal)   Resp 18   Ht 165.1 cm (65\")   Wt 51.7 kg (114 lb)   LMP 09/30/2009   SpO2 99%   Breastfeeding No   BMI 18.97 kg/m²   BP Readings from Last 3 Encounters:   09/14/20 142/84   06/17/20 134/85   03/18/20 148/88     Wt Readings from Last 3 Encounters:   09/14/20 51.7 kg (114 lb)   06/17/20 51.4 kg (113 lb 6.4 oz)   03/18/20 49.9 kg (110 lb)     Physical Exam  Constitutional:       Appearance: Normal appearance. She is well-developed. She is not diaphoretic.   Cardiovascular:      Rate and Rhythm: Normal rate and regular rhythm.      Pulses: Normal pulses.      Heart sounds: Normal heart sounds, S1 normal and S2 normal. No murmur. No friction rub. No gallop.    Pulmonary:      Effort: Pulmonary effort is normal. No accessory muscle usage.      Breath sounds: Normal breath sounds. No stridor. No " decreased breath sounds, wheezing, rhonchi or rales.   Abdominal:      General: Bowel sounds are normal. There is no distension.      Palpations: Abdomen is soft. Abdomen is not rigid. There is no mass or pulsatile mass.      Tenderness: There is no abdominal tenderness. There is no guarding or rebound. Negative signs include Farris's sign.      Hernia: No hernia is present.   Skin:     General: Skin is warm and dry.      Coloration: Skin is not pale.   Neurological:      Mental Status: She is alert and oriented to person, place, and time.      Coordination: Coordination normal.      Gait: Gait normal.           Assessment/Plan      Medications        Problem List         LOS    Bradycardia.  2 episodes in the past 6 months since heart rates down to 39 briefly, symptomatic.  Recently completed course herceptin, echo benign 2/20.  Check blood work, Holter, cardiology referral planned.  Breast cancer.  Clinically improved.  Has completed course chemotherapy.  Has follow-up with oncology upcoming.  Followed by Dr. Valenzuela.  Recommend follow-up visit for comprehensive physical exam and screening test.      Problem List Items Addressed This Visit        Unprioritized    Ductal carcinoma in situ (DCIS) of left breast    Hyperlipidemia    Overview     moderate elevation, did not tolerate statin, start ryr  Discussed diet, exercise, lifestyle modification.   recheck 1 year           Relevant Orders    Lipid Panel With / Chol / HDL Ratio (Completed)    Malaise and fatigue    Relevant Orders    CBC & Differential (Completed)    Comprehensive Metabolic Panel (Completed)    TSH (Completed)    T4, Free (Completed)    Infiltrating ductal carcinoma of left breast (CMS/HCC)    Overview     Clinically improved, status post Rx  Followed by surgery Dr. Mayfield, oncology Dr. Zamora         Underweight    Tobacco use    Overview     Congrats on quitting         Other chest pain - Primary    Relevant Orders    Rhythm Ecg, Report    Holter  Monitor - 24 Hour (Completed)      Other Visit Diagnoses     Palpitations        Relevant Orders    Magnesium (Completed)    Holter Monitor - 24 Hour (Completed)              Expected course, medications, and adverse effects discussed.  Call or return if worsening or persistent symptoms.

## 2020-09-15 ENCOUNTER — CLINICAL SUPPORT (OUTPATIENT)
Dept: FAMILY MEDICINE CLINIC | Facility: CLINIC | Age: 60
End: 2020-09-15

## 2020-09-15 DIAGNOSIS — R00.1 BRADYCARDIA: Primary | ICD-10-CM

## 2020-09-15 DIAGNOSIS — R94.31 HOLTER MONITOR, ABNORMAL: ICD-10-CM

## 2020-09-15 PROCEDURE — 93224 XTRNL ECG REC UP TO 48 HRS: CPT | Performed by: FAMILY MEDICINE

## 2020-09-16 LAB
ALBUMIN SERPL-MCNC: 4.8 G/DL (ref 3.8–4.9)
ALBUMIN/GLOB SERPL: 1.8 {RATIO} (ref 1.2–2.2)
ALP SERPL-CCNC: 84 IU/L (ref 39–117)
ALT SERPL-CCNC: 12 IU/L (ref 0–32)
AST SERPL-CCNC: 14 IU/L (ref 0–40)
BASOPHILS # BLD AUTO: 0.1 X10E3/UL (ref 0–0.2)
BASOPHILS NFR BLD AUTO: 1 %
BILIRUB SERPL-MCNC: 0.4 MG/DL (ref 0–1.2)
BUN SERPL-MCNC: 11 MG/DL (ref 6–24)
BUN/CREAT SERPL: 11 (ref 9–23)
CALCIUM SERPL-MCNC: 10.4 MG/DL (ref 8.7–10.2)
CHLORIDE SERPL-SCNC: 100 MMOL/L (ref 96–106)
CHOLEST SERPL-MCNC: 253 MG/DL (ref 100–199)
CHOLEST/HDLC SERPL: 4.1 RATIO (ref 0–4.4)
CO2 SERPL-SCNC: 29 MMOL/L (ref 20–29)
CREAT SERPL-MCNC: 0.98 MG/DL (ref 0.57–1)
EOSINOPHIL # BLD AUTO: 0.1 X10E3/UL (ref 0–0.4)
EOSINOPHIL NFR BLD AUTO: 1 %
ERYTHROCYTE [DISTWIDTH] IN BLOOD BY AUTOMATED COUNT: 12.8 % (ref 11.7–15.4)
GLOBULIN SER CALC-MCNC: 2.7 G/DL (ref 1.5–4.5)
GLUCOSE SERPL-MCNC: 92 MG/DL (ref 65–99)
HCT VFR BLD AUTO: 44.1 % (ref 34–46.6)
HDLC SERPL-MCNC: 61 MG/DL
HGB BLD-MCNC: 14.9 G/DL (ref 11.1–15.9)
IMM GRANULOCYTES # BLD AUTO: 0 X10E3/UL (ref 0–0.1)
IMM GRANULOCYTES NFR BLD AUTO: 0 %
LDLC SERPL CALC-MCNC: 178 MG/DL (ref 0–99)
LYMPHOCYTES # BLD AUTO: 2.6 X10E3/UL (ref 0.7–3.1)
LYMPHOCYTES NFR BLD AUTO: 29 %
MAGNESIUM SERPL-MCNC: 2 MG/DL (ref 1.6–2.3)
MCH RBC QN AUTO: 32.7 PG (ref 26.6–33)
MCHC RBC AUTO-ENTMCNC: 33.8 G/DL (ref 31.5–35.7)
MCV RBC AUTO: 97 FL (ref 79–97)
MONOCYTES # BLD AUTO: 0.6 X10E3/UL (ref 0.1–0.9)
MONOCYTES NFR BLD AUTO: 7 %
NEUTROPHILS # BLD AUTO: 5.5 X10E3/UL (ref 1.4–7)
NEUTROPHILS NFR BLD AUTO: 62 %
PLATELET # BLD AUTO: 213 X10E3/UL (ref 150–450)
POTASSIUM SERPL-SCNC: 5 MMOL/L (ref 3.5–5.2)
PROT SERPL-MCNC: 7.5 G/DL (ref 6–8.5)
RBC # BLD AUTO: 4.56 X10E6/UL (ref 3.77–5.28)
SODIUM SERPL-SCNC: 142 MMOL/L (ref 134–144)
T4 FREE SERPL-MCNC: 1.46 NG/DL (ref 0.82–1.77)
TRIGL SERPL-MCNC: 80 MG/DL (ref 0–149)
TSH SERPL DL<=0.005 MIU/L-ACNC: 1.55 UIU/ML (ref 0.45–4.5)
VLDLC SERPL CALC-MCNC: 14 MG/DL (ref 5–40)
WBC # BLD AUTO: 8.9 X10E3/UL (ref 3.4–10.8)

## 2020-09-25 ENCOUNTER — OFFICE VISIT (OUTPATIENT)
Dept: CARDIOLOGY | Facility: CLINIC | Age: 60
End: 2020-09-25

## 2020-09-25 VITALS
DIASTOLIC BLOOD PRESSURE: 93 MMHG | SYSTOLIC BLOOD PRESSURE: 168 MMHG | HEIGHT: 65 IN | BODY MASS INDEX: 18.83 KG/M2 | HEART RATE: 56 BPM | WEIGHT: 113 LBS

## 2020-09-25 DIAGNOSIS — R00.1 BRADYCARDIA: ICD-10-CM

## 2020-09-25 DIAGNOSIS — E78.2 MIXED HYPERLIPIDEMIA: Primary | ICD-10-CM

## 2020-09-25 DIAGNOSIS — R07.89 OTHER CHEST PAIN: ICD-10-CM

## 2020-09-25 PROCEDURE — 93000 ELECTROCARDIOGRAM COMPLETE: CPT | Performed by: INTERNAL MEDICINE

## 2020-09-25 PROCEDURE — 99204 OFFICE O/P NEW MOD 45 MIN: CPT | Performed by: INTERNAL MEDICINE

## 2020-09-25 RX ORDER — ASPIRIN 81 MG/1
81 TABLET, CHEWABLE ORAL DAILY
COMMUNITY

## 2020-09-25 NOTE — PROGRESS NOTES
Date of Office Visit: 2020  Encounter Provider: Dr. Juan Odom  Place of Service: [facility] [place]  Patient Name: Lisa Magallanes  :1960  Tin Eli MD    Chief Complaint   Patient presents with   • Chest Pain  Bradycardia     consult holter/echo   • Hyperlipidemia     History of Present Illness:    I am pleased to see Mrs. Magallanes in my office today as a new consultation.    As you know, patient is 60-year-old white female whose past medical history significant for carcinoma of breast, history of left mastectomy, who came today for symptom of bradycardia.    In 2019, patient was diagnosed with carcinoma of breast.  Patient underwent mastectomy and received chemotherapy including Herceptin.  Recent echocardiogram in 2020 showed EF of 55% and myocardial strain was -19.  Patient is recently noted to have relative bradycardia and Holter monitor showed average heart rate of 67 bpm.  No significant pause or bradycardia was noted.  Patient came today to discuss further plan of action and recommendation.    Patient is fairly active.  Patient works as a supervisor.  Patient walks on her feet a lot on job.  Patient denies any chest pain or tightness or heaviness.  No orthopnea PND no syncope or presyncope.  No dizziness and lightheadedness.    EKG showed sinus bradycardia with heart rate of 57 bpm nonspecific ST changes were noted.    At this stage, I would recommend that patient should proceed with TMT.  If she has preserved chronotropic response, I would recommend observation.  Patient was educated about symptom of relative bradycardia        Past Medical History:   Diagnosis Date   • Abnormal TSH    • Allergic rhinitis    • Annual visit for general adult medical examination with abnormal findings 2019   • Breast cancer (CMS/HCC)     left   • Carpal tunnel syndrome    • Cytopenia     Chemo-induced cytopenia   • Ductal carcinoma in situ (DCIS) of left breast    •  Fatigue     resolved   • Fibroadenoma    • GERD (gastroesophageal reflux disease)    • Hyperlipidemia    • Sleep disorder    • Tobacco use 2019   • Underweight 2019         Past Surgical History:   Procedure Laterality Date   • BREAST BIOPSY Left 2018    stereo and core   • BREAST BIOPSY Right 2018    x 2   •  SECTION     •  SECTION     • MASTECTOMY Left 2019   • POSTPARTUM TUBAL LIGATION     • TUBAL ABDOMINAL LIGATION     • VEIN LIGATION     • VENOUS ACCESS DEVICE (PORT) INSERTION Right 2019   • VENOUS ACCESS DEVICE (PORT) REMOVAL Right 2019    Procedure: REMOVAL VENOUS ACCESS DEVICE;  Surgeon: Levy Mayfield MD;  Location: [facility] MAIN OR;  Service: General           Current Outpatient Medications:   •  aspirin 81 MG chewable tablet, Chew 81 mg Daily., Disp: , Rfl:       Social History     Socioeconomic History   • Marital status:      Spouse name: Not on file   • Number of children: Not on file   • Years of education: Not on file   • Highest education level: Not on file   Tobacco Use   • Smoking status: Current Every Day Smoker     Packs/day: 0.50     Types: Cigarettes     Start date:    • Smokeless tobacco: Never Used   Substance and Sexual Activity   • Alcohol use: No     Frequency: Never   • Drug use: No   • Sexual activity: Defer         Review of Systems   Constitution: Negative for chills and fever.   HENT: Negative for ear discharge and nosebleeds.    Eyes: Negative for discharge and redness.   Cardiovascular: Negative for chest pain, orthopnea, palpitations, paroxysmal nocturnal dyspnea and syncope.   Respiratory: Negative for cough, shortness of breath and wheezing.    Endocrine: Negative for heat intolerance.   Skin: Negative for rash.   Musculoskeletal: Negative for arthritis and myalgias.   Gastrointestinal: Negative for abdominal pain, melena, nausea and vomiting.   Genitourinary: Negative for dysuria and hematuria.   Neurological: Negative for  "dizziness, light-headedness, numbness and tremors.   Psychiatric/Behavioral: Negative for depression. The patient is not nervous/anxious.        Procedures      ECG 12 Lead    Date/Time: 9/25/2020 9:29 AM  Performed by: Juan Odmo MD  Authorized by: Juan Odom MD   Previous ECG: no previous ECG available  Rhythm: sinus rhythm  Other findings: non-specific ST-T wave changes and T wave abnormality    Clinical impression: abnormal EKG            ECG 12 Lead    (Results Pending)           Objective:    /93   Pulse 56   Ht 165.1 cm (65\")   Wt 51.3 kg (113 lb)   LMP 09/30/2009   BMI 18.80 kg/m²         Constitutional:       Appearance: Well-developed.   Eyes:      General: No scleral icterus.        Right eye: No discharge.   HENT:      Head: Normocephalic and atraumatic.   Neck:      Thyroid: No thyromegaly.      Lymphadenopathy: No cervical adenopathy.   Pulmonary:      Effort: Pulmonary effort is normal. No respiratory distress.      Breath sounds: Normal breath sounds. No wheezing. No rales.   Cardiovascular:      Normal rate. Regular rhythm.      No gallop.   Abdominal:      Tenderness: There is no abdominal tenderness.   Skin:     Findings: No erythema or rash.   Neurological:      Mental Status: Alert and oriented to person, place, and time.             Assessment:       Diagnosis Plan   1. Mixed hyperlipidemia  ECG 12 Lead    Treadmill Stress Test   2. Other chest pain  ECG 12 Lead    Treadmill Stress Test   3. Bradycardia  Treadmill Stress Test            Plan:       I would recommend to proceed with TMT.  I will see the patient in 6 months.  She had elevated blood pressure.  She does not carry diagnosis of hypertension.  I advised her to monitor the blood pressure and bring the logbook on next visit.  If there is consistent elevation of blood pressure I would consider antihypertensive regimen  "

## 2020-09-30 ENCOUNTER — LAB (OUTPATIENT)
Dept: LAB | Facility: HOSPITAL | Age: 60
End: 2020-09-30

## 2020-09-30 ENCOUNTER — TELEPHONE (OUTPATIENT)
Dept: ONCOLOGY | Facility: CLINIC | Age: 60
End: 2020-09-30

## 2020-09-30 ENCOUNTER — OFFICE VISIT (OUTPATIENT)
Dept: ONCOLOGY | Facility: CLINIC | Age: 60
End: 2020-09-30

## 2020-09-30 VITALS
WEIGHT: 113 LBS | HEART RATE: 80 BPM | TEMPERATURE: 97.3 F | DIASTOLIC BLOOD PRESSURE: 95 MMHG | RESPIRATION RATE: 18 BRPM | BODY MASS INDEX: 18.83 KG/M2 | HEIGHT: 65 IN | SYSTOLIC BLOOD PRESSURE: 165 MMHG

## 2020-09-30 DIAGNOSIS — D05.12 DUCTAL CARCINOMA IN SITU (DCIS) OF LEFT BREAST: ICD-10-CM

## 2020-09-30 DIAGNOSIS — C50.912 HER2-POSITIVE CARCINOMA OF LEFT BREAST (HCC): ICD-10-CM

## 2020-09-30 DIAGNOSIS — R00.1 BRADYCARDIA: ICD-10-CM

## 2020-09-30 DIAGNOSIS — Z17.1 ESTROGEN RECEPTOR NEGATIVE: ICD-10-CM

## 2020-09-30 DIAGNOSIS — C50.912 INFILTRATING DUCTAL CARCINOMA OF LEFT BREAST (HCC): ICD-10-CM

## 2020-09-30 DIAGNOSIS — C50.912 INFILTRATING DUCTAL CARCINOMA OF LEFT BREAST (HCC): Primary | ICD-10-CM

## 2020-09-30 PROBLEM — Z17.31 HER2-POSITIVE CARCINOMA OF LEFT BREAST: Status: ACTIVE | Noted: 2020-09-30

## 2020-09-30 LAB
BASOPHILS # BLD AUTO: 0.03 10*3/MM3 (ref 0–0.2)
BASOPHILS NFR BLD AUTO: 0.4 % (ref 0–1.5)
DEPRECATED RDW RBC AUTO: 45.1 FL (ref 37–54)
EOSINOPHIL # BLD AUTO: 0.18 10*3/MM3 (ref 0–0.4)
EOSINOPHIL NFR BLD AUTO: 2.5 % (ref 0.3–6.2)
ERYTHROCYTE [DISTWIDTH] IN BLOOD BY AUTOMATED COUNT: 13 % (ref 12.3–15.4)
HCT VFR BLD AUTO: 43.9 % (ref 34–46.6)
HGB BLD-MCNC: 14.9 G/DL (ref 12–15.9)
LYMPHOCYTES # BLD AUTO: 2.38 10*3/MM3 (ref 0.7–3.1)
LYMPHOCYTES NFR BLD AUTO: 33.7 % (ref 19.6–45.3)
MCH RBC QN AUTO: 32.9 PG (ref 26.6–33)
MCHC RBC AUTO-ENTMCNC: 33.9 G/DL (ref 31.5–35.7)
MCV RBC AUTO: 96.9 FL (ref 79–97)
MONOCYTES # BLD AUTO: 0.49 10*3/MM3 (ref 0.1–0.9)
MONOCYTES NFR BLD AUTO: 6.9 % (ref 5–12)
NEUTROPHILS NFR BLD AUTO: 3.98 10*3/MM3 (ref 1.7–7)
NEUTROPHILS NFR BLD AUTO: 56.5 % (ref 42.7–76)
PLATELET # BLD AUTO: 196 10*3/MM3 (ref 140–450)
PMV BLD AUTO: 10 FL (ref 6–12)
RBC # BLD AUTO: 4.53 10*6/MM3 (ref 3.77–5.28)
WBC # BLD AUTO: 7.06 10*3/MM3 (ref 3.4–10.8)

## 2020-09-30 PROCEDURE — 99213 OFFICE O/P EST LOW 20 MIN: CPT | Performed by: INTERNAL MEDICINE

## 2020-09-30 PROCEDURE — 36415 COLL VENOUS BLD VENIPUNCTURE: CPT

## 2020-09-30 PROCEDURE — 85025 COMPLETE CBC W/AUTO DIFF WBC: CPT

## 2020-09-30 NOTE — PROGRESS NOTES
ONCOLOGY/HEMATOLOGY    PATIENT: Lisa Magallanes  YOB: 1960  MEDICAL RECORD NUMBER: [id number] OF SERVICE: 09/30/20    Chief complaint:  Left breast pT1b, pN0, Mx infiltrating ductal carcinoma, ER negative, TN negative and HER-2/martha positive.   History of Present Illness:   · Ms. Magallanes reports her sister was diagnosed with breast cancer in  2017.  Patient never had any mammograms done on herself and had her initial one done in November 2018.    · 11/6/18 - Bilateral screening mammogram showed left breast global asymmetry within the upper half of the left breast and right breast focal asymmetry within the lower inner quadrant.This followed up with initial biopsy of the right breast mass.    · 12/12/18 - CT-guided biopsy of the right breast mass at 4 o’clock, which is 4 to 5 cm from the  nipple, were fragments of hyalinized fibroadenoma, negative for carcinoma in situ and invasive carcinoma.     · 12/12/18 - On the left breast stereotactic biopsy of the calcifications, benign fibrofatty breast tissue.  No malignancy.  Another biopsy, specimen 2, of calcifications was positive for DCIS, high nuclear grade, solid type with central necrosis.  No invasive malignancy was identified.  Tumor was ER negative, TN negative.  · 1/29/19 - MRI of the breast bilaterally showed 7 x 6 mm medial to the biopsy clip of the left breast at the biopsy proven DCIS.  Could be residual malignancy near the post biopsy change at the site of recently diagnosed DCIS in the left breast.  An 11 mm mass concerning for invasive malignancy and three areas of focal non-mass enhancement in the upper outer and lower outer quadrants of the left breast.  No MR signs of malignancy in the right breast.     · 2/5/19 - MRI biopsy of the left breast mass at 6 o'clock position, invasive poorly differentiated mammary carcinoma with [place] grade of 8/9.  No in situ carcinoma was identified.  Tumor ER negative, TN negative and  "HER-2/martha testing was positive.   · Patient was sent to the [facility] and seen initially on 2/12/19.   · 2/18/19 - Left breast mastectomy, invasive poorly differentiated ductal carcinoma, 8 mm, pT1b with associated high grade DCIS.  Margins negative. One benign sentinel lymph node.  8/9 Lopez score.  DCIS present 7 mm. Tumor ER negative, NE negative, HER-2/martha positive.    · 3/18/19 - Adjuvant chemotherapy with Taxotere, Carboplatin and Herceptin was initiated.    · 3/20/19 - PET/CT scan with no evidence of metastatic disease within the neck, chest, abdomen or  pelvis.  There is cholelithiasis.    · 7/10/2019 patient completed 6 cycles of adjuvant TCH   · 8/7/2019 patient initiated on single agent Herceptin   · 9/11/2019 2D echo- Left ventricular systolic function is normal. LV ejection fraction is about 60%  · 12/12/2019 2D echo shows ejection fraction of 50% with left ventricular systolic function is normal  · 1/15/2020 - CA 27.29  14.8.  · 3/4/2020 last dose of trastuzumab.  · 3/6/2020 2D echo estimated ejection fraction 55%.  Left ventricular systolic function is normal.  Left atrial cavity size is mild to moderately dilated.  · 5/13/2020-right breast screening mammogram 1. Recommend patient continue with annual unilateral right breast screening mammography and continued clinical breast exams. 2. Due to personal and family history of breast cancer, as well as dense fibroglandular tissue, also recommend risk assessment and possible high risk screening breast MRI, as clinically indicated.    HISTORY OF PRESENT ILLNESS:  She comes unaccompanied today.  Of note, this patient and all of these medical issues are new to me.  Since her last visit, she feels \"wonderful.\"  She had mammogram in May 2020 that was benign.  She has had some issues with getting her BRCA testing paid for.  She has had some episodes of bradycardia where her HR dropped to 39.  She is very active.        Review of Systems - " Oncology     Review of systems:  Constitutional: Denies fatigue, fever, night sweats or weight loss  HEENT: Denies headache, vision changes, dysphagia or odynophagia; allergies  Lymph nodes: Denies lymphadenopathy  Heme: Denies bleeding or abnormal bruising  Respiratory: Denies dyspnea, cough  Cardiovascular: Denies chest pain, palpitations or lower extremity swelling  GI: Denies abdominal pain, nausea, vomiting, diarrhea, constipation, hematochezia or melena  : Denies dysuria or hematuria  Musculoskeletal: Denies myalgia or arthralgia  Skin: Denies rash or skin lesions  Neurologic: Denies focal weakness, paresthesia, loss of balance or coordination  Endocrine: denies hot flashes  Psychologic: Denies depression or anxiety    Past Medical History:   Diagnosis Date   • Abnormal TSH    • Allergic rhinitis    • Annual visit for general adult medical examination with abnormal findings 2019   • Breast cancer (CMS/HCC)     left   • Carpal tunnel syndrome    • Cytopenia     Chemo-induced cytopenia   • Ductal carcinoma in situ (DCIS) of left breast    • Fatigue     resolved   • Fibroadenoma    • GERD (gastroesophageal reflux disease)    • Hyperlipidemia    • Sleep disorder    • Tobacco use 2019   • Underweight 2019       Past Surgical History:   Procedure Laterality Date   • BREAST BIOPSY Left 2018    stereo and core   • BREAST BIOPSY Right 2018    x 2   •  SECTION     •  SECTION     • MASTECTOMY Left 2019   • POSTPARTUM TUBAL LIGATION     • TUBAL ABDOMINAL LIGATION     • VEIN LIGATION     • VENOUS ACCESS DEVICE (PORT) INSERTION Right 2019   • VENOUS ACCESS DEVICE (PORT) REMOVAL Right 2019    Procedure: REMOVAL VENOUS ACCESS DEVICE;  Surgeon: Levy Mayfield MD;  Location: [facility] MAIN OR;  Service: General       Family History   Problem Relation Age of Onset   • Breast cancer Sister 58       Social History     Socioeconomic History   • Marital status:      Spouse  name: Not on file   • Number of children: Not on file   • Years of education: Not on file   • Highest education level: Not on file   Tobacco Use   • Smoking status: Current Every Day Smoker     Packs/day: 0.50     Types: Cigarettes     Start date: 2009   • Smokeless tobacco: Never Used   • Tobacco comment: Patient is advised to quit smoking   Substance and Sexual Activity   • Alcohol use: No     Frequency: Never   • Drug use: No   • Sexual activity: Defer       ALLERGIES:  Morphine    MEDICATION:  Current Outpatient Medications   Medication Sig Dispense Refill   • aspirin 81 MG chewable tablet Chew 81 mg Daily.       No current facility-administered medications for this visit.            PHYSICAL EXAM:    Current Vitals:  Temperature Blood Pressure Heart Rate Resp Rate SpO2 Temp: 97.3 °F (36.3 °C) BP: 165/95 Heart Rate: 80 Resp: 18        VITAL signs in the last 24 hours: [unfilled]       09/30/20  1230   Weight: 51.3 kg (113 lb)       Physical Exam     ECOG PERFORMANCE STATUS:0  Physical Exam:  General: No acute distress  Eyes: Sclera anicteric, EOMS-I  ENT: no mucositis  Neck: Supple, with full ROM  Breast: L mastectomy  Lymph nodes: No cervical, lymphadenopathy  Pulmonary: no wheezes or stridor .  CV: Regular rate and rhythm.  GI: Soft, non-tender,   Vascular: no edema  Neurologic: Cranial nerves 2-12 grossly intact, no focal motor deficits  Psych/MS: Alert and oriented x3,    LABORATORY/DATA:  No results found for: CBCDIF, CMP, FERRITIN, LABIRON, SPEP, FREELIGHTCHA, CEA, , [id number], LDH     IMAGING:  No results found.    PROBLEM LIST:  Patient Active Problem List   Diagnosis   • Infiltrating ductal carcinoma of breast (CMS/HCC)   • Ductal carcinoma in situ (DCIS) of left breast   • Generalized osteoarthrosis, unspecified site   • Mixed hyperlipidemia   • Malaise and fatigue   • Infiltrating ductal carcinoma of left breast (CMS/HCC)   • Annual visit for general adult medical examination with abnormal  findings   • Allergic rhinitis   • Abnormal TSH   • Carpal tunnel syndrome   • Fibroadenoma   • Sleep disorder   • Underweight   • Tobacco use   • Other chest pain       Assessment & Plan  60 y.o. F w/ stage 1a t1b ER neg Her 2 + L breast cancer s/p mastectomy and TCH.      1) L breast cancer: no si/sx of recurrence.  Last mammogrma was benign.  She is doing quite well off treatment.   2) bradycardia:  Has stress test upcoming at end of October.        Da Delgado MD,  09/30/2020   13:08 EDT

## 2020-09-30 NOTE — TELEPHONE ENCOUNTER
Caller: Lisa Magallanes    Relationship to patient: Self    Best call back number: [phone]    Concerns or Questions if Applicable:  Pt tried returning call to [place] about today's appt

## 2020-10-12 ENCOUNTER — TELEPHONE (OUTPATIENT)
Dept: ONCOLOGY | Facility: CLINIC | Age: 60
End: 2020-10-12

## 2020-10-12 NOTE — TELEPHONE ENCOUNTER
Pt called for Clarissa regarding a billing code for her insurance. She asked if Clarissa can return her call.    Phone #: [phone]

## 2020-10-13 ENCOUNTER — TELEPHONE (OUTPATIENT)
Dept: ONCOLOGY | Facility: CLINIC | Age: 60
End: 2020-10-13

## 2020-10-13 NOTE — TELEPHONE ENCOUNTER
Patient would like to speak to Meaghan regarding a bill for BRCA test that she says was coded incorrectly.    [phone]

## 2020-10-13 NOTE — TELEPHONE ENCOUNTER
Spoke with Haily with [facility]. Went over pt account, Inv # [id number]. Gave Haily additional diagnosis code of Family history of breast cancer.   Haily said that it will take 60-75 days to process the claim and if any balance is owed, they will send a new invoice to the pt.       Pt notified of the above and v/u.

## 2020-12-16 ENCOUNTER — APPOINTMENT (OUTPATIENT)
Dept: LAB | Facility: HOSPITAL | Age: 60
End: 2020-12-16

## 2020-12-21 ENCOUNTER — RESULTS ENCOUNTER (OUTPATIENT)
Dept: FAMILY MEDICINE CLINIC | Facility: CLINIC | Age: 60
End: 2020-12-21

## 2020-12-21 ENCOUNTER — OFFICE VISIT (OUTPATIENT)
Dept: FAMILY MEDICINE CLINIC | Facility: CLINIC | Age: 60
End: 2020-12-21

## 2020-12-21 VITALS
DIASTOLIC BLOOD PRESSURE: 84 MMHG | HEIGHT: 65 IN | OXYGEN SATURATION: 98 % | SYSTOLIC BLOOD PRESSURE: 133 MMHG | WEIGHT: 114 LBS | TEMPERATURE: 97.3 F | BODY MASS INDEX: 18.99 KG/M2 | RESPIRATION RATE: 18 BRPM | HEART RATE: 92 BPM

## 2020-12-21 DIAGNOSIS — Z23 NEED FOR PNEUMOCOCCAL VACCINE: ICD-10-CM

## 2020-12-21 DIAGNOSIS — R63.6 UNDERWEIGHT: ICD-10-CM

## 2020-12-21 DIAGNOSIS — Z72.0 TOBACCO USE: ICD-10-CM

## 2020-12-21 DIAGNOSIS — Z00.01 ANNUAL VISIT FOR GENERAL ADULT MEDICAL EXAMINATION WITH ABNORMAL FINDINGS: Primary | ICD-10-CM

## 2020-12-21 DIAGNOSIS — Z12.11 SCREENING FOR MALIGNANT NEOPLASM OF COLON: ICD-10-CM

## 2020-12-21 DIAGNOSIS — E78.2 MIXED HYPERLIPIDEMIA: ICD-10-CM

## 2020-12-21 LAB
BILIRUB BLD-MCNC: NEGATIVE MG/DL
CLARITY, POC: CLEAR
COLOR UR: YELLOW
GLUCOSE UR STRIP-MCNC: NEGATIVE MG/DL
KETONES UR QL: NEGATIVE
LEUKOCYTE EST, POC: NEGATIVE
NITRITE UR-MCNC: NEGATIVE MG/ML
PH UR: 5 [PH] (ref 5–8)
PROT UR STRIP-MCNC: NEGATIVE MG/DL
RBC # UR STRIP: NEGATIVE /UL
SP GR UR: 1.02 (ref 1–1.03)
UROBILINOGEN UR QL: NORMAL

## 2020-12-21 PROCEDURE — 90670 PCV13 VACCINE IM: CPT | Performed by: FAMILY MEDICINE

## 2020-12-21 PROCEDURE — 90471 IMMUNIZATION ADMIN: CPT | Performed by: FAMILY MEDICINE

## 2020-12-21 PROCEDURE — 81003 URINALYSIS AUTO W/O SCOPE: CPT | Performed by: FAMILY MEDICINE

## 2020-12-21 PROCEDURE — 99396 PREV VISIT EST AGE 40-64: CPT | Performed by: FAMILY MEDICINE

## 2020-12-21 NOTE — PROGRESS NOTES
Subjective   Lisa Magallanes is a 60 y.o. female.     Chief Complaint   Patient presents with   • Annual Exam   • Hyperlipidemia       The patient is here: to discuss health maintenance and disease prevention to follow up on multiple medical problems.  Last comprehensive physical was on 9/30/2019.  Previous physical was performed by  Tin Eli MD  Overall has: moderate activity with work/home activities, good appetite, feels well with minor complaints, good energy level and is sleeping poorly. Nutrition: balanced diet. Last tetanus shot was unknown. Patient is doing routine self breast exams: monthly Patient's last PAP Smear was: Unknown. The Patient's last Mammogram was: 3/19/2020.    Hyperlipidemia  This is a chronic problem. The current episode started more than 1 year ago. Recent lipid tests were reviewed and are high. She has no history of diabetes, hypothyroidism or nephrotic syndrome. Pertinent negatives include no chest pain, focal sensory loss, focal weakness or shortness of breath. Current antihyperlipidemic treatment includes diet change and exercise. There are no compliance problems.         Recent Hospitalizations:  No hospitalization(s) within the last year..  ccc      I personally reviewed and updated the patient's allergies, medications, problem list, and past medical, surgical, social, and family history. I have reviewed and confirmed the accuracy of the HPI and ROS as documented by the MA/LPN/RN Tin Eli MD    Family History   Problem Relation Age of Onset   • Breast cancer Sister 58       Social History     Tobacco Use   • Smoking status: Current Every Day Smoker     Packs/day: 0.50     Types: Cigarettes     Start date: 2009   • Smokeless tobacco: Never Used   • Tobacco comment: Patient is advised to quit smoking   Substance Use Topics   • Alcohol use: No     Frequency: Never   • Drug use: No       Past Surgical History:   Procedure Laterality Date   • BREAST BIOPSY Left  2018    stereo and core   • BREAST BIOPSY Right 2018    x 2   •  SECTION     •  SECTION     • MASTECTOMY Left 2019   • POSTPARTUM TUBAL LIGATION     • TUBAL ABDOMINAL LIGATION     • VEIN LIGATION     • VENOUS ACCESS DEVICE (PORT) INSERTION Right 2019   • VENOUS ACCESS DEVICE (PORT) REMOVAL Right 2019    Procedure: REMOVAL VENOUS ACCESS DEVICE;  Surgeon: Levy Mayfield MD;  Location: [facility] OR;  Service: General       Patient Active Problem List   Diagnosis   • Infiltrating ductal carcinoma of breast (CMS/HCC)   • Ductal carcinoma in situ (DCIS) of left breast   • Generalized osteoarthrosis, unspecified site   • Mixed hyperlipidemia   • Malaise and fatigue   • Infiltrating ductal carcinoma of left breast (CMS/HCC)   • Annual visit for general adult medical examination with abnormal findings   • Allergic rhinitis   • Abnormal TSH   • Carpal tunnel syndrome   • Fibroadenoma   • Sleep disorder   • Underweight   • Tobacco use   • Other chest pain   • Estrogen receptor negative   • HER2-positive carcinoma of left breast (CMS/HCC)   • Colon cancer screening         Current Outpatient Medications:   •  aspirin 81 MG chewable tablet, Chew 81 mg Daily., Disp: , Rfl:          Review of Systems   Constitutional: Negative for chills and diaphoresis.   HENT: Negative for trouble swallowing and voice change.    Eyes: Negative for visual disturbance.   Respiratory: Negative for shortness of breath.    Cardiovascular: Negative for chest pain and palpitations.   Gastrointestinal: Negative for abdominal pain and nausea.   Endocrine: Negative for polydipsia and polyphagia.   Genitourinary: Negative for hematuria.   Musculoskeletal: Negative for neck stiffness.   Skin: Negative for color change and pallor.   Allergic/Immunologic: Negative for immunocompromised state.   Neurological: Negative for focal weakness, seizures and syncope.   Hematological: Negative for adenopathy.  "  Psychiatric/Behavioral: Negative for hallucinations, sleep disturbance and suicidal ideas.       I have reviewed and confirmed the accuracy of the ROS as documented by the MA/LPN/RN Tin Eli MD      Objective   /84 (BP Location: Right arm, Patient Position: Sitting, Cuff Size: Adult)   Pulse 92   Temp 97.3 °F (36.3 °C)   Resp 18   Ht 165.1 cm (65\")   Wt 51.7 kg (114 lb)   LMP 09/30/2009   SpO2 98%   BMI 18.97 kg/m²   BP Readings from Last 3 Encounters:   12/21/20 133/84   09/30/20 165/95   09/25/20 168/93     Wt Readings from Last 3 Encounters:   12/21/20 51.7 kg (114 lb)   09/30/20 51.3 kg (113 lb)   09/25/20 51.3 kg (113 lb)     Physical Exam  Constitutional:       Appearance: She is well-developed. She is not diaphoretic.   HENT:      Head: Normocephalic.      Right Ear: Tympanic membrane, ear canal and external ear normal.      Left Ear: Tympanic membrane, ear canal and external ear normal.      Nose: Nose normal.   Eyes:      General: Lids are normal.      Conjunctiva/sclera: Conjunctivae normal.      Pupils: Pupils are equal, round, and reactive to light.   Neck:      Thyroid: No thyromegaly.      Vascular: No carotid bruit or JVD.      Trachea: No tracheal deviation.   Cardiovascular:      Rate and Rhythm: Normal rate and regular rhythm.      Heart sounds: Normal heart sounds. No murmur. No friction rub. No gallop.    Pulmonary:      Effort: Pulmonary effort is normal.      Breath sounds: Normal breath sounds. No stridor. No decreased breath sounds, wheezing or rales.   Abdominal:      General: Bowel sounds are normal. There is no distension.      Palpations: Abdomen is soft. There is no mass.      Tenderness: There is no abdominal tenderness. There is no guarding or rebound.      Hernia: No hernia is present.   Lymphadenopathy:      Head:      Right side of head: No submental, submandibular, tonsillar, preauricular, posterior auricular or occipital adenopathy.      Left side of head: No " submental, submandibular, tonsillar, preauricular, posterior auricular or occipital adenopathy.      Cervical: No cervical adenopathy.   Skin:     General: Skin is warm and dry.      Coloration: Skin is not pale.   Neurological:      Mental Status: She is alert and oriented to person, place, and time.      Cranial Nerves: No cranial nerve deficit.      Sensory: No sensory deficit.      Coordination: Coordination normal.      Gait: Gait normal.      Deep Tendon Reflexes: Reflexes are normal and symmetric.         Recent Lab Results:    No results found for: HGBA1C  Lab Results   Component Value Date    GLU 92 09/15/2020     Lab Results   Component Value Date     (H) 09/15/2020     (H) 10/29/2018     (H) 09/05/2017     Lab Results   Component Value Date    CHOL 245 (H) 10/29/2018    CHOL 266 (H) 09/05/2017    CHOL 247 (H) 02/08/2016     Lab Results   Component Value Date    TRIG 80 09/15/2020    TRIG 99 10/29/2018    TRIG 87 09/05/2017     Lab Results   Component Value Date    HDL 61 09/15/2020    HDL 62 10/29/2018    HDL 61 09/05/2017     No results found for: PSA  Lab Results   Component Value Date    WBC 7.06 09/30/2020    HGB 14.9 09/30/2020    HCT 43.9 09/30/2020    MCV 96.9 09/30/2020     09/30/2020     Lab Results   Component Value Date    TSH 1.550 09/15/2020      Lab Results   Component Value Date    GLUCOSE 105 (H) 06/17/2020    BUN 11 09/15/2020    CREATININE 0.98 09/15/2020    EGFRIFNONA 64 09/15/2020    EGFRIFAFRI 74 09/15/2020    BCR 11 09/15/2020    K 5.0 09/15/2020    CO2 29 09/15/2020    CALCIUM 10.4 (H) 09/15/2020    PROTENTOTREF 7.5 09/15/2020    ALBUMIN 4.8 09/15/2020    LABIL2 1.8 09/15/2020    AST 14 09/15/2020    ALT 12 09/15/2020     No results found for: DINORAH, RF, SEDRATE   No results found for: CRP   No results found for: IRON, TIBC, FERRITIN   No results found for: [id number]     Age-appropriate Screening Schedule:  Refer to the list below for future screening  recommendations based on patient's age, sex and/or medical conditions. Orders for these recommended tests are listed in the plan section. The patient has been provided with a written plan.    Health Maintenance   Topic Date Due   • COLONOSCOPY  1960   • TDAP/TD VACCINES (1 - Tdap) 03/17/1981   • ZOSTER VACCINE (1 of 2) 03/17/2012   • PAP SMEAR  02/23/2019   • MAMMOGRAM  05/13/2021   • LIPID PANEL  09/15/2021   • INFLUENZA VACCINE  Completed           Assessment/Plan      Medications        Problem List         LOS    Physical.  Doing well, vaccines updated.  Discussed coated baby aspirin daily.  Discussed health maintenance, screening test, lifestyle modification.  Pap benign 2016 plan repeat next year.  Bradycardia.  Clinically improved currently, no further episodes.  History of 2 episodes in the past 6 months since heart rates down to 39 briefly, symptomatic.  Recently completed course herceptin, echo benign 2/20.    Has had eval per Dr. Odom, stress testing has been recommended, she declines this currently.  Breast cancer.  Clinically improved.  Has completed course chemotherapy.  Has follow-up with oncology upcoming.  Followed by Dr. Valenzuela.  Undergoing mammogram every 3 months currently.  Tobacco use.  Improved, has cut back.  Encourage cessation.  Recommend CT scan chest screening for lung cancer she declines but states she will consider next year.  PET CT with granuloma by 1 only 2019.  Hyperlipidemia.  Moderate elevation.  Discussed diet, exercise, lifestyle modification.  Add red yeast rice/fish oil.  Follow-up recheck.  Colon screening.  Cologuard scheduled.      Diagnoses and all orders for this visit:    1. Annual visit for general adult medical examination with abnormal findings (Primary)  -     POC Urinalysis Dipstick, Automated    2. Mixed hyperlipidemia    3. Tobacco use    4. Underweight    5. Screening for malignant neoplasm of colon  -     Cologuard - Stool, Per Rectum; Future    6. Need  for pneumococcal vaccine  -     Pneumococcal Conjugate Vaccine 13-Valent All    Other orders  -     Cancel: POCT urinalysis dipstick, manual  -     Cancel: CBC & Differential  -     Cancel: Comprehensive Metabolic Panel  -     Cancel: Lipid Panel With / Chol / HDL Ratio  -     Cancel: TSH              Expected course, medications, and adverse effects discussed.  Call or return if worsening or persistent symptoms.  I wore protective equipment throughout this patient encounter including a mask, gloves, and eye protection.  Hand hygiene was performed before donning protective equipment and after removal when leaving the room. The complete contents of the Assessment and Plan as documented above have been reviewed and addressed by myself with the patient today as part of an ongoing evaluation / treatment plan.  If some of the documentation has been copied from a previous note and is unchanged it indicates that this problem / plan has been assessed today but is stable from a previous visit and no changes have been recommended.

## 2021-06-16 ENCOUNTER — HOSPITAL ENCOUNTER (OUTPATIENT)
Dept: MAMMOGRAPHY | Facility: HOSPITAL | Age: 61
Discharge: HOME | End: 2021-06-16
Admitting: FAMILY MEDICINE

## 2021-06-16 DIAGNOSIS — Z12.31 SCREENING MAMMOGRAM, ENCOUNTER FOR: ICD-10-CM

## 2021-06-16 PROCEDURE — 77063 BREAST TOMOSYNTHESIS BI: CPT

## 2021-06-16 PROCEDURE — 77067 SCR MAMMO BI INCL CAD: CPT

## 2021-06-23 ENCOUNTER — TELEPHONE (OUTPATIENT)
Dept: FAMILY MEDICINE CLINIC | Facility: CLINIC | Age: 61
End: 2021-06-23

## 2021-06-23 NOTE — TELEPHONE ENCOUNTER
----- Message from Tin Eli MD sent at 6/22/2021  8:49 AM EDT -----  Let her know her mammogram does show that she has 2 asymmetric areas in her right breast that cannot be completely evaluated on the screening ultrasound, lets get her set up for a diagnostic mammogram/ultrasound of her right breast if oncology has not already scheduled it, thanks

## 2021-06-24 ENCOUNTER — HOSPITAL ENCOUNTER (OUTPATIENT)
Dept: MAMMOGRAPHY | Facility: HOSPITAL | Age: 61
Discharge: HOME | End: 2021-06-24

## 2021-06-24 ENCOUNTER — HOSPITAL ENCOUNTER (OUTPATIENT)
Dept: ULTRASOUND IMAGING | Facility: HOSPITAL | Age: 61
Discharge: HOME | End: 2021-06-24

## 2021-06-24 DIAGNOSIS — N64.89 BREAST ASYMMETRY: ICD-10-CM

## 2021-06-24 PROCEDURE — 77065 DX MAMMO INCL CAD UNI: CPT

## 2021-06-24 PROCEDURE — G0279 TOMOSYNTHESIS, MAMMO: HCPCS

## 2021-06-24 PROCEDURE — 76642 ULTRASOUND BREAST LIMITED: CPT

## 2021-12-21 NOTE — PROGRESS NOTES
Subjective   Lisa Magallanes is a 61 y.o. female.     Chief Complaint   Patient presents with   • Annual Exam   • Hyperlipidemia       The patient is here: to discuss health maintenance and disease prevention to follow up on multiple medical problems.  Last comprehensive physical was on 12/21/2020.  Previous physical was performed by  Tin Eli MD  Overall has: vigorous activity with work/home activities, good appetite, good energy level and is sleeping poorly. Nutrition: eating a variety of foods. Last tetanus shot was unknown. Patient is doing routine self breast exams: occasionally Patient's last stress test was: 9/25/2020      Last Completed Mammogram          MAMMOGRAM (Yearly) Next due on 6/24/2022 06/24/2021  Mammo Diagnostic Digital Tomosynthesis Right With CAD    06/16/2021  Mammo Screening Modified With Tomosynthesis Right With CAD    05/13/2020  Mammo Screening Modified With Tomosynthesis Right With CAD    11/19/2018  SCANNED - MAMMO    11/19/2018  Mammo Diagnostic Digital Tomosynthesis Bilateral With CAD    Only the first 5 history entries have been loaded, but more history exists.               Last Completed Colonoscopy          COLORECTAL CANCER SCREENING (COLOGUARD - Every 3 Years) Next due on 1/5/2024 01/05/2021  Cologuard - Stool, Per Rectum    09/19/2020  Smart[employer]: WORKFLOW - QUALITY MEASUREMENT - EXCLUSION REASONS - COLORECTAL CANCER SCREENING NOT PERFORMED FOR MEDICAL REASONS                Hyperlipidemia  This is a chronic problem. The current episode started more than 1 year ago. Recent lipid tests were reviewed and are high. She has no history of diabetes, hypothyroidism or nephrotic syndrome. Pertinent negatives include no chest pain, focal sensory loss, focal weakness or shortness of breath. Current antihyperlipidemic treatment includes diet change and exercise. There are no compliance problems.         Recent Hospitalizations:  No hospitalization(s) within the last  year..  ccc      I personally reviewed and updated the patient's allergies, medications, problem list, and past medical, surgical, social, and family history. I have reviewed and confirmed the accuracy of the HPI and ROS as documented by the MA/LPN/RN Tin Eli MD    Family History   Problem Relation Age of Onset   • Breast cancer Sister 58   • Heart disease Mother    • Stroke Mother        Social History     Tobacco Use   • Smoking status: Current Every Day Smoker     Packs/day: 0.25     Types: Cigarettes     Start date:    • Smokeless tobacco: Never Used   • Tobacco comment: Patient is advised to quit smoking   Vaping Use   • Vaping Use: Never used   Substance Use Topics   • Alcohol use: No   • Drug use: No       Past Surgical History:   Procedure Laterality Date   • BREAST BIOPSY Left 2018    stereo and core   • BREAST BIOPSY Right 2018    x 2   •  SECTION     •  SECTION     • MASTECTOMY Left 2019   • POSTPARTUM TUBAL LIGATION     • TUBAL ABDOMINAL LIGATION     • VEIN LIGATION     • VENOUS ACCESS DEVICE (PORT) INSERTION Right 2019   • VENOUS ACCESS DEVICE (PORT) REMOVAL Right 2019    Procedure: REMOVAL VENOUS ACCESS DEVICE;  Surgeon: Levy Mayfield MD;  Location: [facility];  Service: General       Patient Active Problem List   Diagnosis   • Infiltrating ductal carcinoma of breast (HCC)   • Ductal carcinoma in situ (DCIS) of left breast   • Generalized osteoarthrosis, unspecified site   • Mixed hyperlipidemia   • Malaise and fatigue   • Infiltrating ductal carcinoma of left breast (HCC)   • Annual visit for general adult medical examination with abnormal findings   • Allergic rhinitis   • Abnormal TSH   • Carpal tunnel syndrome   • Fibroadenoma   • Sleep disorder   • Underweight   • Tobacco use   • Other chest pain   • Estrogen receptor negative   • HER2-positive carcinoma of left breast (HCC)   • Colon cancer screening         Current Outpatient Medications:   •   "aspirin 81 MG chewable tablet, Chew 81 mg Daily., Disp: , Rfl:     Review of Systems   Constitutional: Negative for chills and diaphoresis.   Eyes: Negative for visual disturbance.   Respiratory: Negative for shortness of breath.    Cardiovascular: Negative for chest pain and palpitations.   Gastrointestinal: Negative for abdominal pain and nausea.   Endocrine: Negative for polydipsia and polyphagia.   Musculoskeletal: Negative for neck stiffness.   Skin: Negative for color change and pallor.   Neurological: Negative for focal weakness, seizures and syncope.   Hematological: Negative for adenopathy.   Psychiatric/Behavioral: Negative for hallucinations and suicidal ideas.       I have reviewed and confirmed the accuracy of the ROS as documented by the MA/LPN/RN Tin Eli MD      Objective   BP (!) 150/102   Pulse 92   Temp 97.5 °F (36.4 °C)   Resp 18   Ht 165.1 cm (65\")   Wt 50.7 kg (111 lb 12.8 oz)   LMP 09/30/2009   SpO2 94%   Breastfeeding No   BMI 18.60 kg/m²   BP Readings from Last 3 Encounters:   12/22/21 (!) 150/102   12/21/20 133/84   09/30/20 165/95     Wt Readings from Last 3 Encounters:   12/22/21 50.7 kg (111 lb 12.8 oz)   12/21/20 51.7 kg (114 lb)   09/30/20 51.3 kg (113 lb)     Physical Exam  Constitutional:       Appearance: She is well-developed. She is not diaphoretic.   HENT:      Head: Normocephalic.      Right Ear: Tympanic membrane, ear canal and external ear normal.      Left Ear: Tympanic membrane, ear canal and external ear normal.      Nose: Nose normal.   Eyes:      General: Lids are normal.      Conjunctiva/sclera: Conjunctivae normal.      Pupils: Pupils are equal, round, and reactive to light.   Neck:      Thyroid: No thyromegaly.      Vascular: No carotid bruit or JVD.      Trachea: No tracheal deviation.   Cardiovascular:      Rate and Rhythm: Normal rate and regular rhythm.      Heart sounds: Normal heart sounds. No murmur heard.  No friction rub. No gallop.  "   Pulmonary:      Effort: Pulmonary effort is normal.      Breath sounds: Normal breath sounds. No stridor. No decreased breath sounds, wheezing or rales.   Abdominal:      General: Bowel sounds are normal. There is no distension.      Palpations: Abdomen is soft. There is no mass.      Tenderness: There is no abdominal tenderness. There is no guarding or rebound.      Hernia: No hernia is present.   Lymphadenopathy:      Head:      Right side of head: No submental, submandibular, tonsillar, preauricular, posterior auricular or occipital adenopathy.      Left side of head: No submental, submandibular, tonsillar, preauricular, posterior auricular or occipital adenopathy.      Cervical: No cervical adenopathy.   Skin:     General: Skin is warm and dry.      Coloration: Skin is not pale.   Neurological:      Mental Status: She is alert and oriented to person, place, and time.      Cranial Nerves: No cranial nerve deficit.      Sensory: No sensory deficit.      Coordination: Coordination normal.      Gait: Gait normal.      Deep Tendon Reflexes: Reflexes are normal and symmetric.         Data / Lab Results:    No results found for: HGBA1C     Lab Results   Component Value Date     (H) 12/18/2021     (H) 09/15/2020     (H) 10/29/2018     Lab Results   Component Value Date    CHOL 245 (H) 10/29/2018    CHOL 266 (H) 09/05/2017    CHOL 247 (H) 02/08/2016     Lab Results   Component Value Date    TRIG 109 12/18/2021    TRIG 80 09/15/2020    TRIG 99 10/29/2018     Lab Results   Component Value Date    HDL 51 12/18/2021    HDL 61 09/15/2020    HDL 62 10/29/2018     No results found for: PSA  Lab Results   Component Value Date    WBC 8.8 12/18/2021    HGB 14.5 12/18/2021    HCT 43.1 12/18/2021    MCV 94 12/18/2021     12/18/2021     Lab Results   Component Value Date    TSH 1.520 12/18/2021      Lab Results   Component Value Date    GLUCOSE 108 (H) 12/18/2021    BUN 14 12/18/2021    CREATININE 1.01  (H) 12/18/2021    EGFRIFNONA 61 12/18/2021    EGFRIFAFRI 70 12/18/2021    BCR 14 12/18/2021    K 4.7 12/18/2021    CO2 27 12/18/2021    CALCIUM 10.0 12/18/2021    PROTENTOTREF 6.8 12/18/2021    ALBUMIN 4.3 12/18/2021    LABIL2 1.7 12/18/2021    AST 16 12/18/2021    ALT 19 12/18/2021     No results found for: DINORAH, RF, SEDRATE   No results found for: CRP   No results found for: IRON, TIBC, FERRITIN   No results found for: [id number]     Age-appropriate Screening Schedule:  Refer to the list below for future screening recommendations based on patient's age, sex and/or medical conditions. Orders for these recommended tests are listed in the plan section. The patient has been provided with a written plan.    Health Maintenance   Topic Date Due   • PAP SMEAR  02/23/2019   • ZOSTER VACCINE (1 of 2) 12/22/2021 (Originally 3/17/2012)   • TDAP/TD VACCINES (1 - Tdap) 12/22/2022 (Originally 3/17/1981)   • MAMMOGRAM  06/24/2022   • LIPID PANEL  12/18/2022   • INFLUENZA VACCINE  Completed           Assessment/Plan      Medications        Problem List         LOS      Physical. Doing well, vaccines updated.  Discussed coated baby aspirin daily.  Discussed health maintenance, screening test, lifestyle modification.  Pap benign 2016 recommend repeat today she declines.  Bradycardia.  Clinically improved currently, no further episodes.  History of 2 episodes in the past 6 months since heart rates down to 39 briefly, symptomatic.  Recently completed course herceptin, echo benign 2/20.    Has had eval per Dr. Odom, stress testing has been recommended, she declines this currently.  Breast cancer.  Clinically improved.  Has completed course chemotherapy.  Followed by oncology Dr. Valenzuela.    Diagnostic mammogram/breast ultrasound benign 6/21, recommend reschedule mammogram/ultrasound she states her oncologist is scheduling this for her.  Tobacco use.  Improved, has cut back.  Encourage cessation.  Recommend CT scan chest screening for  lung cancer she declines but states she will consider next year.  PET CT with granuloma by 1 only 2019.  Recommend treadmill echocardiogram, carotid Doppler she declines.  Hyperlipidemia.  Moderate elevation.  Discussed diet, exercise, lifestyle modification.  Add red yeast rice/fish oil.  Follow-up recheck.  Colon screening.  Cologuard benign 2020.   Family history CVA.  Her mom.  Recommend carotid Doppler she declines.  Stop smoking.  Plan COVID-19 vaccination status: Vaccinated.  Recommend booster she states she will consider.    Diagnoses and all orders for this visit:    1. Annual visit for general adult medical examination with abnormal findings (Primary)    2. Mixed hyperlipidemia    3. Infiltrating ductal carcinoma of left breast (HCC)    4. Tobacco use    5. Underweight    6. Screening for malignant neoplasm of colon    7. Need for pneumococcal vaccination  -     Pneumococcal Polysaccharide Vaccine 23-Valent Greater Than or Equal To 3yo Subcutaneous / IM    8. Need for influenza vaccination  -     FluLaval/Fluarix/Fluzone >6 Months              Expected course, medications, and adverse effects discussed.  Call or return if worsening or persistent symptoms.  I wore protective equipment throughout this patient encounter including a mask, gloves, and eye protection.  Hand hygiene was performed before donning protective equipment and after removal when leaving the room. The complete contents of the Assessment and Plan and Data / Lab Results as documented above have been reviewed and addressed by myself with the patient today as part of an ongoing evaluation / treatment plan.  If some of the documentation has been copied from a previous note and is unchanged it indicates that this problem / plan has been assessed today but is stable from a previous visit and no changes have been recommended.

## 2021-12-22 ENCOUNTER — OFFICE VISIT (OUTPATIENT)
Dept: FAMILY MEDICINE CLINIC | Facility: CLINIC | Age: 61
End: 2021-12-22

## 2021-12-22 VITALS
SYSTOLIC BLOOD PRESSURE: 150 MMHG | DIASTOLIC BLOOD PRESSURE: 102 MMHG | HEART RATE: 92 BPM | HEIGHT: 65 IN | OXYGEN SATURATION: 94 % | RESPIRATION RATE: 18 BRPM | BODY MASS INDEX: 18.63 KG/M2 | WEIGHT: 111.8 LBS | TEMPERATURE: 97.5 F

## 2021-12-22 DIAGNOSIS — E78.2 MIXED HYPERLIPIDEMIA: ICD-10-CM

## 2021-12-22 DIAGNOSIS — Z23 NEED FOR INFLUENZA VACCINATION: ICD-10-CM

## 2021-12-22 DIAGNOSIS — Z12.11 SCREENING FOR MALIGNANT NEOPLASM OF COLON: ICD-10-CM

## 2021-12-22 DIAGNOSIS — Z72.0 TOBACCO USE: ICD-10-CM

## 2021-12-22 DIAGNOSIS — Z23 NEED FOR PNEUMOCOCCAL VACCINATION: ICD-10-CM

## 2021-12-22 DIAGNOSIS — C50.912 INFILTRATING DUCTAL CARCINOMA OF LEFT BREAST (HCC): ICD-10-CM

## 2021-12-22 DIAGNOSIS — Z00.01 ANNUAL VISIT FOR GENERAL ADULT MEDICAL EXAMINATION WITH ABNORMAL FINDINGS: Primary | ICD-10-CM

## 2021-12-22 DIAGNOSIS — R63.6 UNDERWEIGHT: ICD-10-CM

## 2021-12-22 PROCEDURE — 90472 IMMUNIZATION ADMIN EACH ADD: CPT | Performed by: FAMILY MEDICINE

## 2021-12-22 PROCEDURE — 90686 IIV4 VACC NO PRSV 0.5 ML IM: CPT | Performed by: FAMILY MEDICINE

## 2021-12-22 PROCEDURE — 99396 PREV VISIT EST AGE 40-64: CPT | Performed by: FAMILY MEDICINE

## 2021-12-22 PROCEDURE — 90471 IMMUNIZATION ADMIN: CPT | Performed by: FAMILY MEDICINE

## 2021-12-22 PROCEDURE — 90732 PPSV23 VACC 2 YRS+ SUBQ/IM: CPT | Performed by: FAMILY MEDICINE

## 2022-01-04 ENCOUNTER — PATIENT MESSAGE (OUTPATIENT)
Dept: FAMILY MEDICINE CLINIC | Facility: CLINIC | Age: 62
End: 2022-01-04

## 2022-01-06 ENCOUNTER — TELEPHONE (OUTPATIENT)
Dept: FAMILY MEDICINE CLINIC | Facility: CLINIC | Age: 62
End: 2022-01-06

## 2022-01-06 NOTE — TELEPHONE ENCOUNTER
Let her know sometimes those vaccines can activate her immune system and give her some of the symptoms, she may also have a viral upper RESPA Tory infection transaction, if she has no known drug allergies go ahead and send is a zpack for her to cover for this, okay to take a OTC ibuprofen or Tylenol as is needed, in she should let me know if she does not improve,thanks

## 2022-01-06 NOTE — TELEPHONE ENCOUNTER
----- Message from Lisa Magallanes sent at 1/6/2022  9:50 AM EST -----  Regarding: results  Meaghan.  I received 2 shots on the 22nd of December and I have had flu like symptoms since..I got tested for covid and it was negative..tere been taking over the counter medication..but its not helping much..is there something that can be prescribed to help?  Thanks in advance

## 2022-01-07 RX ORDER — AZITHROMYCIN 250 MG/1
TABLET, FILM COATED ORAL
Qty: 6 TABLET | Refills: 0 | Status: SHIPPED | OUTPATIENT
Start: 2022-01-07

## 2022-06-27 ENCOUNTER — TELEPHONE (OUTPATIENT)
Dept: FAMILY MEDICINE CLINIC | Facility: CLINIC | Age: 62
End: 2022-06-27

## 2022-06-27 DIAGNOSIS — Z12.31 ENCOUNTER FOR SCREENING MAMMOGRAM FOR MALIGNANT NEOPLASM OF BREAST: Primary | ICD-10-CM

## 2022-06-27 NOTE — TELEPHONE ENCOUNTER
Caller: Lisa Magallanes    Relationship: Self    Best call back number: [phone]    What was the call regarding: PATIENT IS NEEDING A PHYSICAL DONE BY November FOR WORK. PATIENT STATED THAT HER JOB IS REQUIRING THAT IT IS DONE BY THEN. PATIENT GOT HER LAST PHYSICAL ON 12/22/2021 SO PHYSICALS ARE ONLY SHOWING FOR AFTER THAT DATE. PLEASE ADVISE IF PATIENT CAN BE SCHEDULED SOONER.     PATIENT STATED THAT SHE IS ALSO NEEDING AN ORDER PUT IN FOR HER YEARLY MAMMOGRAM AT [facility]. PLEASE ADVISE.     Do you require a callback: YES

## 2022-07-15 ENCOUNTER — HOSPITAL ENCOUNTER (OUTPATIENT)
Dept: MAMMOGRAPHY | Facility: HOSPITAL | Age: 62
Discharge: HOME | End: 2022-07-15
Admitting: FAMILY MEDICINE

## 2022-07-15 PROCEDURE — 77063 BREAST TOMOSYNTHESIS BI: CPT

## 2022-07-15 PROCEDURE — 77067 SCR MAMMO BI INCL CAD: CPT

## 2022-08-01 ENCOUNTER — OFFICE VISIT (OUTPATIENT)
Dept: FAMILY MEDICINE CLINIC | Facility: CLINIC | Age: 62
End: 2022-08-01

## 2022-08-01 VITALS
TEMPERATURE: 98.4 F | WEIGHT: 114 LBS | HEIGHT: 65 IN | RESPIRATION RATE: 20 BRPM | SYSTOLIC BLOOD PRESSURE: 138 MMHG | OXYGEN SATURATION: 98 % | BODY MASS INDEX: 18.99 KG/M2 | HEART RATE: 90 BPM | DIASTOLIC BLOOD PRESSURE: 90 MMHG

## 2022-08-01 DIAGNOSIS — R53.81 MALAISE AND FATIGUE: ICD-10-CM

## 2022-08-01 DIAGNOSIS — Z12.31 ENCOUNTER FOR SCREENING MAMMOGRAM FOR MALIGNANT NEOPLASM OF BREAST: ICD-10-CM

## 2022-08-01 DIAGNOSIS — R53.83 MALAISE AND FATIGUE: ICD-10-CM

## 2022-08-01 DIAGNOSIS — E78.2 MIXED HYPERLIPIDEMIA: ICD-10-CM

## 2022-08-01 DIAGNOSIS — Z00.01 ANNUAL VISIT FOR GENERAL ADULT MEDICAL EXAMINATION WITH ABNORMAL FINDINGS: Primary | ICD-10-CM

## 2022-08-01 DIAGNOSIS — Z12.11 COLON CANCER SCREENING: ICD-10-CM

## 2022-08-01 DIAGNOSIS — Z72.0 TOBACCO USE: ICD-10-CM

## 2022-08-01 DIAGNOSIS — R63.6 UNDERWEIGHT: ICD-10-CM

## 2022-08-01 LAB
BILIRUB BLD-MCNC: NEGATIVE MG/DL
CLARITY, POC: CLEAR
COLOR UR: YELLOW
GLUCOSE UR STRIP-MCNC: NEGATIVE MG/DL
KETONES UR QL: NEGATIVE
LEUKOCYTE EST, POC: NEGATIVE
NITRITE UR-MCNC: NEGATIVE MG/ML
PH UR: 5 [PH] (ref 5–8)
PROT UR STRIP-MCNC: ABNORMAL MG/DL
RBC # UR STRIP: NEGATIVE /UL
SP GR UR: 1.01 (ref 1–1.03)
UROBILINOGEN UR QL: NORMAL

## 2022-08-01 PROCEDURE — 99396 PREV VISIT EST AGE 40-64: CPT | Performed by: FAMILY MEDICINE

## 2022-08-01 PROCEDURE — 81002 URINALYSIS NONAUTO W/O SCOPE: CPT | Performed by: FAMILY MEDICINE

## 2022-08-01 NOTE — PROGRESS NOTES
Subjective   Lisa Magallanes is a 62 y.o. female.     Chief Complaint   Patient presents with   • Annual Exam       The patient is here: for coordination of medical care to discuss health maintenance and disease prevention to follow up on multiple medical problems.  Last comprehensive physical was on 12/22/2021.  Previous physical was performed by  Tin Eli MD  Overall has: moderate activity with work/home activities, exercises 2 - 3 times per week, good appetite, feels well with no complaints, good energy level and is sleeping poorly. Nutrition: eating a variety of foods. Last tetanus shot was unknown.   Patient is doing routine self breast exams: occasionally Patient's last stress test was: 3/6/2020   Last Completed Mammogram          Ordered - MAMMOGRAM (Yearly) Ordered on 8/1/2022    07/15/2022  Mammo Screening Modified With Tomosynthesis Right With CAD    06/24/2021  Mammo Diagnostic Digital Tomosynthesis Right With CAD    06/16/2021  Mammo Screening Modified With Tomosynthesis Right With CAD    05/13/2020  Mammo Screening Modified With Tomosynthesis Right With CAD    11/19/2018  SCANNED - MAMMO    Only the first 5 history entries have been loaded, but more history exists.              Last Completed Colonoscopy          COLORECTAL CANCER SCREENING (COLOGUARD - Every 3 Years) Next due on 9/19/2023 09/19/2020  SmartData: WORKFLOW - QUALITY MEASUREMENT - EXCLUSION REASONS - COLORECTAL CANCER SCREENING NOT PERFORMED FOR MEDICAL REASONS                Hyperlipidemia  This is a chronic problem. The current episode started more than 1 year ago. Recent lipid tests were reviewed and are high. She has no history of diabetes, hypothyroidism or nephrotic syndrome. Pertinent negatives include no chest pain, focal sensory loss, focal weakness or shortness of breath. Current antihyperlipidemic treatment includes diet change and exercise. There are no compliance problems.         Recent Hospitalizations:  No  hospitalization(s) within the last year..  ccc      I personally reviewed and updated the patient's allergies, medications, problem list, and past medical, surgical, social, and family history. I have reviewed and confirmed the accuracy of the HPI and ROS as documented by the MA/LPN/RN Tin Eli MD    Family History   Problem Relation Age of Onset   • Breast cancer Sister 58   • Heart disease Mother    • Stroke Mother        Social History     Tobacco Use   • Smoking status: Former Smoker     Packs/day: 0.25     Years: 12.00     Pack years: 3.00     Types: Cigarettes     Start date:      Quit date:      Years since quittin.5   • Smokeless tobacco: Never Used   • Tobacco comment: Patient is advised to quit smoking   Vaping Use   • Vaping Use: Never used   Substance Use Topics   • Alcohol use: No   • Drug use: No       Past Surgical History:   Procedure Laterality Date   • BREAST BIOPSY Left 2018    stereo and core   • BREAST BIOPSY Right 2018    x 2   •  SECTION     •  SECTION     • MASTECTOMY Left 2019   • POSTPARTUM TUBAL LIGATION     • TUBAL ABDOMINAL LIGATION     • VEIN LIGATION     • VENOUS ACCESS DEVICE (PORT) INSERTION Right 2019   • VENOUS ACCESS DEVICE (PORT) REMOVAL Right 2019    Procedure: REMOVAL VENOUS ACCESS DEVICE;  Surgeon: Levy Mayfield MD;  Location: [facility] OR;  Service: General       Patient Active Problem List   Diagnosis   • Infiltrating ductal carcinoma of breast (HCC)   • Ductal carcinoma in situ (DCIS) of left breast   • Generalized osteoarthrosis, unspecified site   • Mixed hyperlipidemia   • Malaise and fatigue   • Infiltrating ductal carcinoma of left breast (HCC)   • Annual visit for general adult medical examination with abnormal findings   • Allergic rhinitis   • Abnormal TSH   • Carpal tunnel syndrome   • Fibroadenoma   • Sleep disorder   • Underweight   • Tobacco use   • Other chest pain   • Estrogen receptor negative   •  "HER2-positive carcinoma of left breast (HCC)   • Colon cancer screening         Current Outpatient Medications:   •  aspirin 81 MG chewable tablet, Chew 81 mg Daily., Disp: , Rfl:   •  POTASSIUM PO, Take  by mouth., Disp: , Rfl:   •  azithromycin (ZITHROMAX) 250 MG tablet, Take 2 tablets the first day, then 1 tablet daily for 4 days., Disp: 6 tablet, Rfl: 0    Review of Systems   Constitutional: Negative for chills and diaphoresis.   Eyes: Negative for visual disturbance.   Respiratory: Negative for shortness of breath.    Cardiovascular: Negative for chest pain and palpitations.   Gastrointestinal: Negative for abdominal pain and nausea.   Endocrine: Negative for polydipsia and polyphagia.   Musculoskeletal: Negative for neck stiffness.   Skin: Negative for color change and pallor.   Neurological: Negative for focal weakness, seizures and syncope.   Hematological: Negative for adenopathy.   Psychiatric/Behavioral: Negative for hallucinations and suicidal ideas.       I have reviewed and confirmed the accuracy of the ROS as documented by the MA/LPN/RN Tin Eli MD      Objective   /90   Pulse 90   Temp 98.4 °F (36.9 °C)   Resp 20   Ht 165.1 cm (65\")   Wt 51.7 kg (114 lb)   LMP 09/30/2009   SpO2 98%   BMI 18.97 kg/m²   BP Readings from Last 3 Encounters:   08/01/22 138/90   12/22/21 (!) 150/102   12/21/20 133/84     Wt Readings from Last 3 Encounters:   08/01/22 51.7 kg (114 lb)   12/22/21 50.7 kg (111 lb 12.8 oz)   12/21/20 51.7 kg (114 lb)     Physical Exam  Constitutional:       Appearance: She is well-developed. She is not diaphoretic.   HENT:      Head: Normocephalic.      Right Ear: Tympanic membrane, ear canal and external ear normal.      Left Ear: Tympanic membrane, ear canal and external ear normal.      Nose: Nose normal.   Eyes:      General: Lids are normal.      Conjunctiva/sclera: Conjunctivae normal.      Pupils: Pupils are equal, round, and reactive to light.   Neck:      Thyroid: " No thyromegaly.      Vascular: No carotid bruit or JVD.      Trachea: No tracheal deviation.   Cardiovascular:      Rate and Rhythm: Normal rate and regular rhythm.      Heart sounds: Normal heart sounds. No murmur heard.    No friction rub. No gallop.   Pulmonary:      Effort: Pulmonary effort is normal.      Breath sounds: Normal breath sounds. No stridor. No decreased breath sounds, wheezing or rales.   Abdominal:      General: Bowel sounds are normal. There is no distension.      Palpations: Abdomen is soft. There is no mass.      Tenderness: There is no abdominal tenderness. There is no guarding or rebound.      Hernia: No hernia is present.   Lymphadenopathy:      Head:      Right side of head: No submental, submandibular, tonsillar, preauricular, posterior auricular or occipital adenopathy.      Left side of head: No submental, submandibular, tonsillar, preauricular, posterior auricular or occipital adenopathy.      Cervical: No cervical adenopathy.   Skin:     General: Skin is warm and dry.      Coloration: Skin is not pale.   Neurological:      Mental Status: She is alert and oriented to person, place, and time.      Cranial Nerves: No cranial nerve deficit.      Sensory: No sensory deficit.      Coordination: Coordination normal.      Gait: Gait normal.      Deep Tendon Reflexes: Reflexes are normal and symmetric.         Data / Lab Results:    No results found for: HGBA1C     Lab Results   Component Value Date     (H) 12/18/2021     (H) 09/15/2020     (H) 10/29/2018     Lab Results   Component Value Date    CHOL 245 (H) 10/29/2018    CHOL 266 (H) 09/05/2017    CHOL 247 (H) 02/08/2016     Lab Results   Component Value Date    TRIG 109 12/18/2021    TRIG 80 09/15/2020    TRIG 99 10/29/2018     Lab Results   Component Value Date    HDL 51 12/18/2021    HDL 61 09/15/2020    HDL 62 10/29/2018     No results found for: PSA  Lab Results   Component Value Date    WBC 8.8 12/18/2021    HGB  14.5 12/18/2021    HCT 43.1 12/18/2021    MCV 94 12/18/2021     12/18/2021     Lab Results   Component Value Date    TSH 1.520 12/18/2021      Lab Results   Component Value Date    GLUCOSE 108 (H) 12/18/2021    BUN 14 12/18/2021    CREATININE 1.01 (H) 12/18/2021    EGFRIFNONA 61 12/18/2021    EGFRIFAFRI 70 12/18/2021    BCR 14 12/18/2021    K 4.7 12/18/2021    CO2 27 12/18/2021    CALCIUM 10.0 12/18/2021    PROTENTOTREF 6.8 12/18/2021    ALBUMIN 4.3 12/18/2021    LABIL2 1.7 12/18/2021    AST 16 12/18/2021    ALT 19 12/18/2021     No results found for: DINORAH, RF, SEDRATE   No results found for: CRP   No results found for: IRON, TIBC, FERRITIN   No results found for: [id number]     Age-appropriate Screening Schedule:  Refer to the list below for future screening recommendations based on patient's age, sex and/or medical conditions. Orders for these recommended tests are listed in the plan section. The patient has been provided with a written plan.    Health Maintenance   Topic Date Due   • ZOSTER VACCINE (1 of 2) Never done   • PAP SMEAR  02/23/2019   • TDAP/TD VACCINES (1 - Tdap) 12/22/2022 (Originally 3/17/1979)   • INFLUENZA VACCINE  10/01/2022   • LIPID PANEL  12/18/2022   • MAMMOGRAM  07/15/2023           Assessment & Plan      Medications        Problem List         LOS    Physical.  Doing well, recommend up to date tetanus at [facility].  Discussed coated baby aspirin daily.  Discussed calcium and vitamin D.  Discussed health maintenance, screening test, lifestyle modification.  Pap benign 2016 recommend repeat today she declines.  Bradycardia.  Clinically improved currently, no further episodes.  History of 2 episodes in the past 6 months since heart rates down to 39 briefly, symptomatic.  Recently completed course herceptin, echo benign 2/20.    Has had eval per Dr. Odom, stress testing has been recommended, she declines this currently.  Breast cancer.  Clinically improved.  Has completed course  chemotherapy.  Followed by oncology Dr. Valenzuela in past..    Diagnostic mammogram/breast ultrasound benign 6/21, recommend reschedule mammogram/ultrasound she states her oncologist is scheduling this for her.  Tobacco use.  Congrats on quitting 12/21.  Encourage cessation.  Recommend CT scan chest screening for lung cancer she declines but states she will consider next year.  PET CT with granuloma by 1 only 2019.  Recommend treadmill echocardiogram, carotid Doppler she declines.  Recommend DEXA scan she declines.  Hyperlipidemia.  Moderate elevation.  Discussed diet, exercise, lifestyle modification.  Add red yeast rice/fish oil.  Follow-up recheck.  Colon screening.  Cologuard benign 2020.   Family history CVA.  Her mom.  Recommend carotid Doppler she declines.  Stop smoking.  COVID-19 vaccination status: Vaccinated.  Recommend booster she states she will consider.         Diagnoses and all orders for this visit:    1. Annual visit for general adult medical examination with abnormal findings (Primary)    2. Mixed hyperlipidemia  -     POCT urinalysis dipstick, manual    3. Tobacco use    4. Underweight  Assessment & Plan:  BMI is within normal parameters. No other follow-up for BMI required.        5. Colon cancer screening    6. Encounter for screening mammogram for malignant neoplasm of breast  -     Mammo Screening Digital Tomosynthesis Bilateral With CAD; Future            Expected course, medications, and adverse effects discussed.  Call or return if worsening or persistent symptoms.  I wore protective equipment throughout this patient encounter including a mask, gloves, and eye protection.  Hand hygiene was performed before donning protective equipment and after removal when leaving the room. The complete contents of the Assessment and Plan and Data / Lab Results as documented above have been reviewed and addressed by myself with the patient today as part of an ongoing evaluation / treatment plan.  If some of  the documentation has been copied from a previous note and is unchanged it indicates that this problem / plan has been assessed today but is stable from a previous visit and no changes have been recommended.

## 2022-08-02 LAB
ALBUMIN SERPL-MCNC: 4.6 G/DL (ref 3.8–4.9)
ALBUMIN/GLOB SERPL: 2 {RATIO} (ref 1.2–2.2)
ALP SERPL-CCNC: 71 IU/L (ref 44–121)
ALT SERPL-CCNC: 13 IU/L (ref 0–32)
AST SERPL-CCNC: 24 IU/L (ref 0–40)
BASOPHILS # BLD AUTO: 0.1 X10E3/UL (ref 0–0.2)
BASOPHILS NFR BLD AUTO: 1 %
BILIRUB SERPL-MCNC: 0.3 MG/DL (ref 0–1.2)
BUN SERPL-MCNC: 14 MG/DL (ref 8–27)
BUN/CREAT SERPL: 13 (ref 12–28)
CALCIUM SERPL-MCNC: 9.8 MG/DL (ref 8.7–10.3)
CHLORIDE SERPL-SCNC: 104 MMOL/L (ref 96–106)
CHOLEST SERPL-MCNC: 231 MG/DL (ref 100–199)
CHOLEST/HDLC SERPL: 3.9 RATIO (ref 0–4.4)
CO2 SERPL-SCNC: 25 MMOL/L (ref 20–29)
CREAT SERPL-MCNC: 1.08 MG/DL (ref 0.57–1)
EGFRCR SERPLBLD CKD-EPI 2021: 59 ML/MIN/1.73
EOSINOPHIL # BLD AUTO: 0.2 X10E3/UL (ref 0–0.4)
EOSINOPHIL NFR BLD AUTO: 4 %
ERYTHROCYTE [DISTWIDTH] IN BLOOD BY AUTOMATED COUNT: 13.2 % (ref 11.7–15.4)
GLOBULIN SER CALC-MCNC: 2.3 G/DL (ref 1.5–4.5)
GLUCOSE SERPL-MCNC: 88 MG/DL (ref 65–99)
HCT VFR BLD AUTO: 42.1 % (ref 34–46.6)
HDLC SERPL-MCNC: 60 MG/DL
HGB BLD-MCNC: 13.7 G/DL (ref 11.1–15.9)
IMM GRANULOCYTES # BLD AUTO: 0 X10E3/UL (ref 0–0.1)
IMM GRANULOCYTES NFR BLD AUTO: 0 %
LDLC SERPL CALC-MCNC: 154 MG/DL (ref 0–99)
LYMPHOCYTES # BLD AUTO: 1.9 X10E3/UL (ref 0.7–3.1)
LYMPHOCYTES NFR BLD AUTO: 32 %
MCH RBC QN AUTO: 30.4 PG (ref 26.6–33)
MCHC RBC AUTO-ENTMCNC: 32.5 G/DL (ref 31.5–35.7)
MCV RBC AUTO: 94 FL (ref 79–97)
MONOCYTES # BLD AUTO: 0.5 X10E3/UL (ref 0.1–0.9)
MONOCYTES NFR BLD AUTO: 9 %
NEUTROPHILS # BLD AUTO: 3.2 X10E3/UL (ref 1.4–7)
NEUTROPHILS NFR BLD AUTO: 54 %
PLATELET # BLD AUTO: 165 X10E3/UL (ref 150–450)
POTASSIUM SERPL-SCNC: 5.4 MMOL/L (ref 3.5–5.2)
PROT SERPL-MCNC: 6.9 G/DL (ref 6–8.5)
RBC # BLD AUTO: 4.5 X10E6/UL (ref 3.77–5.28)
SODIUM SERPL-SCNC: 143 MMOL/L (ref 134–144)
TRIGL SERPL-MCNC: 94 MG/DL (ref 0–149)
TSH SERPL DL<=0.005 MIU/L-ACNC: 2.32 UIU/ML (ref 0.45–4.5)
VLDLC SERPL CALC-MCNC: 17 MG/DL (ref 5–40)
WBC # BLD AUTO: 5.8 X10E3/UL (ref 3.4–10.8)

## 2023-06-20 ENCOUNTER — TELEPHONE (OUTPATIENT)
Dept: FAMILY MEDICINE CLINIC | Facility: CLINIC | Age: 63
End: 2023-06-20

## 2023-06-20 NOTE — TELEPHONE ENCOUNTER
Caller: Lisa Magallanes    Relationship: Self    Best call back number: [phone]     What orders are you requesting (i.e. lab or imaging):MAMMOGRAM    In what timeframe would the patient need to come in: AS SOON AS POSSIBLE    Where will you receive your lab/imaging services: IN OFFICE    Additional notes: [facility]

## 2023-08-04 NOTE — PROGRESS NOTES
Subjective   Lisa Magallanes is a 63 y.o. female.     Chief Complaint   Patient presents with    Annual Exam    Hyperlipidemia       The patient is here: for coordination of medical care to discuss health maintenance and disease prevention to follow up on multiple medical problems.  Last comprehensive physical was on 2022.  Previous physical was performed by  Tin Eli MD  Overall has: vigorous activity with work/home activities, exercises 2 - 3 times per week, poor appetite, good energy level, and is sleeping poorly. Nutrition: eating a variety of foods. Last tetanus shot was unknown. Patient last mammogram was 2023.    Hyperlipidemia  This is a chronic problem. The current episode started more than 1 year ago. Recent lipid tests were reviewed and are high. She has no history of diabetes, hypothyroidism or nephrotic syndrome. Pertinent negatives include no chest pain, focal sensory loss, focal weakness or shortness of breath. Current antihyperlipidemic treatment includes diet change and exercise. There are no compliance problems.  Risk factors for coronary artery disease include dyslipidemia.      Recent Hospitalizations:  No hospitalization(s) within the last year..  ccc      I personally reviewed and updated the patient's allergies, medications, problem list, and past medical, surgical, social, and family history. I have reviewed and confirmed the accuracy of the HPI and ROS as documented by the MA/REESEN/RN Meaghan Bojorquez MA    Family History   Problem Relation Age of Onset    Breast cancer Sister 58    Heart disease Mother     Stroke Mother        Social History     Tobacco Use    Smoking status: Former     Packs/day: 0.25     Years: 12.00     Pack years: 3.00     Types: Cigarettes     Start date: 2009     Quit date: 2021     Years since quittin.5     Passive exposure: Past    Smokeless tobacco: Never   Vaping Use    Vaping Use: Some days    Start date: 2022    Substances:  Nicotine    Devices: Disposable   Substance Use Topics    Alcohol use: No    Drug use: No       Past Surgical History:   Procedure Laterality Date    BREAST BIOPSY Left 2018    stereo and core    BREAST BIOPSY Right 2018    x 2     SECTION       SECTION      MASTECTOMY Left 2019    POSTPARTUM TUBAL LIGATION      TUBAL ABDOMINAL LIGATION      VEIN LIGATION      VENOUS ACCESS DEVICE (PORT) INSERTION Right 2019    VENOUS ACCESS DEVICE (PORT) REMOVAL Right 2019    Procedure: REMOVAL VENOUS ACCESS DEVICE;  Surgeon: Levy Mayfield MD;  Location: [facility] OR;  Service: General       Patient Active Problem List   Diagnosis    Infiltrating ductal carcinoma of breast    Ductal carcinoma in situ (DCIS) of left breast    Generalized osteoarthrosis, unspecified site    Mixed hyperlipidemia    Malaise and fatigue    Infiltrating ductal carcinoma of left breast    Annual visit for general adult medical examination with abnormal findings    Allergic rhinitis    Abnormal TSH    Carpal tunnel syndrome    Fibroadenoma    Sleep disorder    Underweight    Tobacco use    Other chest pain    Estrogen receptor negative    HER2-positive carcinoma of left breast    Colon cancer screening         Current Outpatient Medications:     aspirin 81 MG chewable tablet, Chew 1 tablet Daily., Disp: , Rfl:     Review of Systems   Constitutional:  Negative for chills and diaphoresis.   Eyes:  Negative for visual disturbance.   Respiratory:  Negative for shortness of breath.    Cardiovascular:  Negative for chest pain and palpitations.   Gastrointestinal:  Negative for abdominal pain and nausea.   Endocrine: Negative for polydipsia and polyphagia.   Musculoskeletal:  Negative for neck stiffness.   Skin:  Negative for color change and pallor.   Neurological:  Negative for focal weakness, seizures and syncope.   Hematological:  Negative for adenopathy.   Psychiatric/Behavioral:  Negative for hallucinations and  "suicidal ideas.      I have reviewed and confirmed the accuracy of the ROS as documented by the MA/LPN/RN Meaghan Bojorquez MA      Objective   /84 (BP Location: Right arm, Patient Position: Sitting, Cuff Size: Adult)   Pulse 82   Temp 96.9 øF (36.1 øC) (Temporal)   Resp 16   Ht 165.1 cm (65\")   Wt 53.6 kg (118 lb 3.2 oz)   LMP 09/30/2009   SpO2 95%   BMI 19.67 kg/mý   BP Readings from Last 3 Encounters:   08/07/23 138/84   08/01/22 138/90   12/22/21 (!) 150/102     Wt Readings from Last 3 Encounters:   08/07/23 53.6 kg (118 lb 3.2 oz)   08/01/22 51.7 kg (114 lb)   12/22/21 50.7 kg (111 lb 12.8 oz)     Physical Exam  Constitutional:       Appearance: She is well-developed. She is not diaphoretic.   HENT:      Head: Normocephalic.      Right Ear: Tympanic membrane, ear canal and external ear normal.      Left Ear: Tympanic membrane, ear canal and external ear normal.      Nose: Nose normal.   Eyes:      General: Lids are normal.      Conjunctiva/sclera: Conjunctivae normal.      Pupils: Pupils are equal, round, and reactive to light.   Neck:      Thyroid: No thyromegaly.      Vascular: No carotid bruit or JVD.      Trachea: No tracheal deviation.   Cardiovascular:      Rate and Rhythm: Normal rate and regular rhythm.      Heart sounds: Normal heart sounds. No murmur heard.    No friction rub. No gallop.   Pulmonary:      Effort: Pulmonary effort is normal.      Breath sounds: Normal breath sounds. No stridor. No decreased breath sounds, wheezing or rales.   Abdominal:      General: Bowel sounds are normal. There is no distension.      Palpations: Abdomen is soft. There is no mass.      Tenderness: There is no abdominal tenderness. There is no guarding or rebound.      Hernia: No hernia is present.   Lymphadenopathy:      Head:      Right side of head: No submental, submandibular, tonsillar, preauricular, posterior auricular or occipital adenopathy.      Left side of head: No submental, submandibular, " tonsillar, preauricular, posterior auricular or occipital adenopathy.      Cervical: No cervical adenopathy.   Skin:     General: Skin is warm and dry.      Coloration: Skin is not pale.   Neurological:      Mental Status: She is alert and oriented to person, place, and time.      Cranial Nerves: No cranial nerve deficit.      Sensory: No sensory deficit.      Coordination: Coordination normal.      Gait: Gait normal.      Deep Tendon Reflexes: Reflexes are normal and symmetric.       Data / Lab Results:    No results found for: HGBA1C     Lab Results   Component Value Date     (H) 08/01/2022     (H) 12/18/2021     (H) 09/15/2020     Lab Results   Component Value Date    CHOL 245 (H) 10/29/2018    CHOL 266 (H) 09/05/2017    CHOL 247 (H) 02/08/2016     Lab Results   Component Value Date    TRIG 94 08/01/2022    TRIG 109 12/18/2021    TRIG 80 09/15/2020     Lab Results   Component Value Date    HDL 60 08/01/2022    HDL 51 12/18/2021    HDL 61 09/15/2020     No results found for: PSA  Lab Results   Component Value Date    WBC 5.8 08/01/2022    HGB 13.7 08/01/2022    HCT 42.1 08/01/2022    MCV 94 08/01/2022     08/01/2022     Lab Results   Component Value Date    TSH 2.320 08/01/2022     Lab Results   Component Value Date    GLUCOSE 88 08/01/2022    BUN 14 08/01/2022    CREATININE 1.08 (H) 08/01/2022    EGFRIFNONA 61 12/18/2021    EGFRIFAFRI 70 12/18/2021    BCR 13 08/01/2022    K 5.4 (H) 08/01/2022    CO2 25 08/01/2022    CALCIUM 9.8 08/01/2022    PROTENTOTREF 6.9 08/01/2022    ALBUMIN 4.6 08/01/2022    LABIL2 2.0 08/01/2022    AST 24 08/01/2022    ALT 13 08/01/2022     No results found for: DINORAH, RF, SEDRATE   No results found for: CRP   No results found for: IRON, TIBC, FERRITIN   No results found for: [id number]     Age-appropriate Screening Schedule:  Refer to the list below for future screening recommendations based on patient's age, sex and/or medical conditions. Orders for these  recommended tests are listed in the plan section. The patient has been provided with a written plan.    Health Maintenance   Topic Date Due    COVID-19 Vaccine (1) Never done    TDAP/TD VACCINES (1 - Tdap) Never done    ZOSTER VACCINE (1 of 2) Never done    HEPATITIS C SCREENING  Never done    PAP SMEAR  02/23/2019    ANNUAL PHYSICAL  08/01/2023    LIPID PANEL  08/01/2023    INFLUENZA VACCINE  10/01/2023    COLORECTAL CANCER SCREENING  01/05/2024    MAMMOGRAM  07/17/2024    Pneumococcal Vaccine 0-64  Aged Out           Assessment & Plan      Medications        Problem List         LOS    Physical.  Doing well, recommend up to date tetanus at [facility].  Discussed coated baby aspirin daily.  Discussed calcium and vitamin D.  Discussed health maintenance, screening test, lifestyle modification.  Pap benign 2016 repeat sent today.  Bradycardia.  Clinically improved currently, no further episodes.  History of 2 episodes in the past 6 months since heart rates down to 39 briefly, symptomatic.  Recently completed course herceptin, echo benign 2/20.    Has had eval per Dr. Odom, stress testing has been recommended, she declines this currently.  Breast cancer.  Clinically improved.  Has completed course chemotherapy.  Followed by oncology Dr. Valenzuela.    Diagnostic mammogram/breast ultrasound benign 6/21, mammogram benign 6/23.  Breast exam benign today.  Tobacco use.  Congrats on quitting 12/21.  Encourage cessation.  Recommend CT scan chest screening for lung cancer she declines but states she will consider next year.  PET CT with granuloma by 1 only 2019.  Recommend treadmill echocardiogram, carotid Doppler she declines.  Recommend DEXA scan she declines.  Hyperlipidemia.  Moderate elevation.  Discussed diet, exercise, lifestyle modification.  She did not start red yeast rice/fish oil.  Follow-up recheck.  Recheck levels.  Consider add statin if peristent elevatin.  Family history CVA.  Her mom.  Recommend carotid Doppler  she declines.  Stop smoking.  Colon screening.  Colguard benign 2022.      Diagnoses and all orders for this visit:    1. Annual visit for general adult medical examination with abnormal findings (Primary)    2. Mixed hyperlipidemia    3. Tobacco use    4. Encounter for screening mammogram for malignant neoplasm of breast    5. Screening for malignant neoplasm of colon    6. Screening for malignant neoplasm of cervix    7. Malaise and fatigue    8. Abnormal TSH              Expected course, medications, and adverse effects discussed.  Call or return if worsening or persistent symptoms.  I wore protective equipment throughout this patient encounter including a mask, gloves, and eye protection.  Hand hygiene was performed before donning protective equipment and after removal when leaving the room. The complete contents of the Assessment and Plan and Data / Lab Results as documented above have been reviewed and addressed by myself with the patient today as part of an ongoing evaluation / treatment plan.  If some of the documentation has been copied from a previous note and is unchanged it indicates that this problem / plan has been assessed today but is stable from a previous visit and no changes have been recommended.  The separate E/M service provided today is significant, medically necessary, and separately identifiable.

## 2023-08-07 ENCOUNTER — OFFICE VISIT (OUTPATIENT)
Dept: FAMILY MEDICINE CLINIC | Facility: CLINIC | Age: 63
End: 2023-08-07
Payer: COMMERCIAL

## 2023-08-07 VITALS
TEMPERATURE: 96.9 F | DIASTOLIC BLOOD PRESSURE: 84 MMHG | BODY MASS INDEX: 19.69 KG/M2 | SYSTOLIC BLOOD PRESSURE: 138 MMHG | WEIGHT: 118.2 LBS | HEART RATE: 82 BPM | RESPIRATION RATE: 16 BRPM | HEIGHT: 65 IN | OXYGEN SATURATION: 95 %

## 2023-08-07 DIAGNOSIS — Z00.01 ANNUAL VISIT FOR GENERAL ADULT MEDICAL EXAMINATION WITH ABNORMAL FINDINGS: Primary | ICD-10-CM

## 2023-08-07 DIAGNOSIS — Z12.4 SCREENING FOR MALIGNANT NEOPLASM OF CERVIX: ICD-10-CM

## 2023-08-07 DIAGNOSIS — R53.81 MALAISE AND FATIGUE: ICD-10-CM

## 2023-08-07 DIAGNOSIS — E78.2 MIXED HYPERLIPIDEMIA: ICD-10-CM

## 2023-08-07 DIAGNOSIS — R53.83 MALAISE AND FATIGUE: ICD-10-CM

## 2023-08-07 DIAGNOSIS — Z72.0 TOBACCO USE: ICD-10-CM

## 2023-08-07 DIAGNOSIS — R79.89 ABNORMAL TSH: ICD-10-CM

## 2023-08-07 DIAGNOSIS — Z12.31 ENCOUNTER FOR SCREENING MAMMOGRAM FOR MALIGNANT NEOPLASM OF BREAST: ICD-10-CM

## 2023-08-07 DIAGNOSIS — Z12.11 SCREENING FOR MALIGNANT NEOPLASM OF COLON: ICD-10-CM

## 2023-08-07 PROCEDURE — 99396 PREV VISIT EST AGE 40-64: CPT | Performed by: FAMILY MEDICINE

## 2023-08-07 NOTE — LETTER
August 7, 2023    [facility]  [address]  [place] [address]                                                    Tin Eli MD

## 2023-08-08 LAB
ALBUMIN SERPL-MCNC: 4.6 G/DL (ref 3.9–4.9)
ALBUMIN/GLOB SERPL: 1.8 {RATIO} (ref 1.2–2.2)
ALP SERPL-CCNC: 71 IU/L (ref 44–121)
ALT SERPL-CCNC: 13 IU/L (ref 0–32)
AST SERPL-CCNC: 20 IU/L (ref 0–40)
BASOPHILS # BLD AUTO: 0 X10E3/UL (ref 0–0.2)
BASOPHILS NFR BLD AUTO: 1 %
BILIRUB SERPL-MCNC: 0.3 MG/DL (ref 0–1.2)
BUN SERPL-MCNC: 18 MG/DL (ref 8–27)
BUN/CREAT SERPL: 17 (ref 12–28)
CALCIUM SERPL-MCNC: 9.9 MG/DL (ref 8.7–10.3)
CHLORIDE SERPL-SCNC: 101 MMOL/L (ref 96–106)
CHOLEST SERPL-MCNC: 257 MG/DL (ref 100–199)
CHOLEST/HDLC SERPL: 4.2 RATIO (ref 0–4.4)
CO2 SERPL-SCNC: 26 MMOL/L (ref 20–29)
CREAT SERPL-MCNC: 1.09 MG/DL (ref 0.57–1)
EGFRCR SERPLBLD CKD-EPI 2021: 58 ML/MIN/1.73
EOSINOPHIL # BLD AUTO: 0.2 X10E3/UL (ref 0–0.4)
EOSINOPHIL NFR BLD AUTO: 3 %
ERYTHROCYTE [DISTWIDTH] IN BLOOD BY AUTOMATED COUNT: 13.4 % (ref 11.7–15.4)
GLOBULIN SER CALC-MCNC: 2.6 G/DL (ref 1.5–4.5)
GLUCOSE SERPL-MCNC: 89 MG/DL (ref 70–99)
HCT VFR BLD AUTO: 43.8 % (ref 34–46.6)
HDLC SERPL-MCNC: 61 MG/DL
HGB BLD-MCNC: 14.4 G/DL (ref 11.1–15.9)
IMM GRANULOCYTES # BLD AUTO: 0 X10E3/UL (ref 0–0.1)
IMM GRANULOCYTES NFR BLD AUTO: 0 %
LDLC SERPL CALC-MCNC: 175 MG/DL (ref 0–99)
LYMPHOCYTES # BLD AUTO: 1.7 X10E3/UL (ref 0.7–3.1)
LYMPHOCYTES NFR BLD AUTO: 28 %
MCH RBC QN AUTO: 30.8 PG (ref 26.6–33)
MCHC RBC AUTO-ENTMCNC: 32.9 G/DL (ref 31.5–35.7)
MCV RBC AUTO: 94 FL (ref 79–97)
MONOCYTES # BLD AUTO: 0.6 X10E3/UL (ref 0.1–0.9)
MONOCYTES NFR BLD AUTO: 10 %
NEUTROPHILS # BLD AUTO: 3.6 X10E3/UL (ref 1.4–7)
NEUTROPHILS NFR BLD AUTO: 58 %
PLATELET # BLD AUTO: 175 X10E3/UL (ref 150–450)
POTASSIUM SERPL-SCNC: 4.5 MMOL/L (ref 3.5–5.2)
PROT SERPL-MCNC: 7.2 G/DL (ref 6–8.5)
RBC # BLD AUTO: 4.67 X10E6/UL (ref 3.77–5.28)
SODIUM SERPL-SCNC: 144 MMOL/L (ref 134–144)
T4 FREE SERPL-MCNC: 1.31 NG/DL (ref 0.82–1.77)
TRIGL SERPL-MCNC: 120 MG/DL (ref 0–149)
TSH SERPL DL<=0.005 MIU/L-ACNC: 2.44 UIU/ML (ref 0.45–4.5)
VLDLC SERPL CALC-MCNC: 21 MG/DL (ref 5–40)
WBC # BLD AUTO: 6.3 X10E3/UL (ref 3.4–10.8)

## 2023-08-10 LAB
C TRACH RRNA CVX QL NAA+PROBE: NEGATIVE
CYTOLOGIST CVX/VAG CYTO: NORMAL
CYTOLOGY CVX/VAG DOC CYTO: NORMAL
CYTOLOGY CVX/VAG DOC THIN PREP: NORMAL
DX ICD CODE: NORMAL
HIV 1 & 2 AB SER-IMP: NORMAL
HPV GENOTYPE REFLEX: NORMAL
HPV I/H RISK 4 DNA CVX QL PROBE+SIG AMP: NEGATIVE
N GONORRHOEA RRNA CVX QL NAA+PROBE: NEGATIVE
OTHER STN SPEC: NORMAL
STAT OF ADQ CVX/VAG CYTO-IMP: NORMAL

## 2023-08-22 DIAGNOSIS — E78.2 MIXED HYPERLIPIDEMIA: Primary | ICD-10-CM

## 2023-08-22 RX ORDER — ROSUVASTATIN CALCIUM 5 MG/1
5 TABLET, COATED ORAL DAILY
Qty: 90 TABLET | Refills: 3 | Status: SHIPPED | OUTPATIENT
Start: 2023-08-22

## 2024-05-13 NOTE — PROGRESS NOTES
Subjective   Lisa Magallanes is a 64 y.o. female.     Chief Complaint   Patient presents with    Hyperlipidemia    Joint Pain       History of Present Illness  Lisa is here to discuss Rosuvastatin could be causing her joint pain and left knee pain. Onset was about 3 weeks ago with the pain. She is wanting her lab work redone.   Hyperlipidemia  This is a chronic problem. The current episode started more than 1 year ago. Recent lipid tests were reviewed and are high. She has no history of diabetes, hypothyroidism or nephrotic syndrome. Associated symptoms include leg pain. Pertinent negatives include no chest pain, focal sensory loss, focal weakness or shortness of breath. Current antihyperlipidemic treatment includes diet change, exercise and statins. There are no compliance problems.  Risk factors for coronary artery disease include dyslipidemia.   Joint Pain  This is a new problem. The current episode started 1 to 4 weeks ago. Associated symptoms include arthralgias. Pertinent negatives include no abdominal pain, chest pain, chills, diaphoresis or nausea. Associated symptoms comments: Left Knee is the worse. The symptoms are aggravated by standing and walking. She has tried rest for the symptoms.            I personally reviewed and updated the patient's allergies, medications, problem list, and past medical, surgical, social, and family history. I have reviewed and confirmed the accuracy of the History of Present Illness and Review of Symptoms as documented by the MA/LPN/RN. Tin Eli MD    Family History   Problem Relation Age of Onset    Breast cancer Sister 58    Heart disease Mother     Stroke Mother        Social History     Tobacco Use    Smoking status: Former     Current packs/day: 0.00     Average packs/day: 0.3 packs/day for 12.0 years (3.0 ttl pk-yrs)     Types: Cigarettes     Start date: 1/1/2009     Quit date: 1/1/2021     Years since quitting: 3.4     Passive exposure: Past     Smokeless tobacco: Never   Vaping Use    Vaping status: Some Days    Start date: 2022    Substances: Nicotine    Devices: Disposable   Substance Use Topics    Alcohol use: No    Drug use: No       Past Surgical History:   Procedure Laterality Date    BREAST BIOPSY Left 2018    stereo and core    BREAST BIOPSY Right 2018    x 2     SECTION       SECTION      MASTECTOMY Left 2019    POSTPARTUM TUBAL LIGATION      TUBAL ABDOMINAL LIGATION      VEIN LIGATION      VENOUS ACCESS DEVICE (PORT) INSERTION Right 2019    VENOUS ACCESS DEVICE (PORT) REMOVAL Right 2019    Procedure: REMOVAL VENOUS ACCESS DEVICE;  Surgeon: Levy Mayfield MD;  Location: [facility] MAIN OR;  Service: General       Patient Active Problem List   Diagnosis    Infiltrating ductal carcinoma of breast    Ductal carcinoma in situ (DCIS) of left breast    Generalized osteoarthrosis, unspecified site    Mixed hyperlipidemia    Malaise and fatigue    Infiltrating ductal carcinoma of left breast    Annual visit for general adult medical examination with abnormal findings    Allergic rhinitis    Abnormal TSH    Carpal tunnel syndrome    Fibroadenoma    Sleep disorder    Underweight    Tobacco use    Other chest pain    Estrogen receptor negative    HER2-positive carcinoma of left breast    Colon cancer screening         Current Outpatient Medications:     aspirin 81 MG chewable tablet, Chew 1 tablet Daily., Disp: , Rfl:     rosuvastatin (CRESTOR) 5 MG tablet, Take 1 tablet by mouth Daily., Disp: 90 tablet, Rfl: 3         Review of Systems   Constitutional:  Negative for chills and diaphoresis.   HENT:  Negative for trouble swallowing and voice change.    Eyes:  Negative for visual disturbance.   Respiratory:  Negative for shortness of breath.    Cardiovascular:  Negative for chest pain and palpitations.   Gastrointestinal:  Negative for abdominal pain and nausea.   Endocrine: Negative for polydipsia and polyphagia.  "  Genitourinary:  Negative for hematuria.   Musculoskeletal:  Positive for arthralgias. Negative for neck stiffness.   Skin:  Negative for color change and pallor.   Allergic/Immunologic: Negative for immunocompromised state.   Neurological:  Negative for focal weakness, seizures and syncope.   Hematological:  Negative for adenopathy.   Psychiatric/Behavioral:  Negative for hallucinations, sleep disturbance and suicidal ideas.        I have reviewed and confirmed the accuracy of the ROS as documented by the MA/LPN/RN Tin Eli MD      Objective   /78 (BP Location: Right arm, Patient Position: Sitting, Cuff Size: Adult)   Pulse 95   Temp 97.5 °F (36.4 °C) (Temporal)   Resp 18   Ht 165.1 cm (65\")   Wt 51.1 kg (112 lb 9.6 oz)   LMP 09/30/2009   SpO2 96%   BMI 18.74 kg/m²   BP Readings from Last 3 Encounters:   05/15/24 128/78   08/07/23 138/84   08/01/22 138/90     Wt Readings from Last 3 Encounters:   05/15/24 51.1 kg (112 lb 9.6 oz)   08/07/23 53.6 kg (118 lb 3.2 oz)   08/01/22 51.7 kg (114 lb)     Physical Exam  Constitutional:       Appearance: She is well-developed. She is not diaphoretic.   HENT:      Head: Normocephalic.      Right Ear: Hearing, tympanic membrane, ear canal and external ear normal.      Left Ear: Hearing, tympanic membrane, ear canal and external ear normal.      Nose: Nose normal. No mucosal edema or congestion.      Right Sinus: No maxillary sinus tenderness or frontal sinus tenderness.      Left Sinus: No maxillary sinus tenderness or frontal sinus tenderness.      Mouth/Throat:      Mouth: No oral lesions.      Pharynx: Uvula midline. No oropharyngeal exudate or posterior oropharyngeal erythema.      Tonsils: No tonsillar exudate.   Eyes:      General: Lids are normal.      Conjunctiva/sclera: Conjunctivae normal.      Pupils: Pupils are equal, round, and reactive to light.   Neck:      Thyroid: No thyromegaly.      Vascular: No carotid bruit or JVD.      Trachea: No " "tracheal deviation.   Cardiovascular:      Rate and Rhythm: Normal rate and regular rhythm.      Heart sounds: Normal heart sounds. No murmur heard.     No friction rub. No gallop.   Pulmonary:      Effort: Pulmonary effort is normal.      Breath sounds: Normal breath sounds. No stridor. No decreased breath sounds, wheezing or rales.   Abdominal:      General: Bowel sounds are normal. There is no distension.      Palpations: Abdomen is soft. There is no mass.      Tenderness: There is no abdominal tenderness. There is no guarding or rebound.      Hernia: No hernia is present.   Lymphadenopathy:      Head:      Right side of head: No submental, submandibular, tonsillar, preauricular, posterior auricular or occipital adenopathy.      Left side of head: No submental, submandibular, tonsillar, preauricular, posterior auricular or occipital adenopathy.      Cervical: No cervical adenopathy.   Skin:     General: Skin is warm and dry.      Coloration: Skin is not pale.   Neurological:      Mental Status: She is alert and oriented to person, place, and time.      Cranial Nerves: No cranial nerve deficit.      Sensory: No sensory deficit.      Coordination: Coordination normal.      Gait: Gait normal.      Deep Tendon Reflexes: Reflexes are normal and symmetric.         Data / Lab Results:    No results found for: \"HGBA1C\"     Lab Results   Component Value Date     (H) 05/15/2024     (H) 08/07/2023     (H) 08/01/2022     Lab Results   Component Value Date    CHOL 245 (H) 10/29/2018    CHOL 266 (H) 09/05/2017    CHOL 247 (H) 02/08/2016     Lab Results   Component Value Date    TRIG 76 05/15/2024    TRIG 120 08/07/2023    TRIG 94 08/01/2022     Lab Results   Component Value Date    HDL 64 05/15/2024    HDL 61 08/07/2023    HDL 60 08/01/2022     No results found for: \"PSA\"  Lab Results   Component Value Date    WBC 7.4 05/15/2024    HGB 15.4 05/15/2024    HCT 47.7 (H) 05/15/2024    MCV 93 05/15/2024    PLT " "216 05/15/2024     Lab Results   Component Value Date    TSH 1.920 05/15/2024      Lab Results   Component Value Date    GLUCOSE 87 05/15/2024    BUN 16 05/15/2024    CREATININE 0.95 05/15/2024    EGFRIFNONA 61 12/18/2021    EGFRIFAFRI 70 12/18/2021    BCR 17 05/15/2024    K 5.0 05/15/2024    CO2 27 05/15/2024    CALCIUM 10.6 (H) 05/15/2024    PROTENTOTREF 7.5 05/15/2024    ALBUMIN 4.8 05/15/2024    LABIL2 1.8 05/15/2024    AST 23 05/15/2024    ALT 14 05/15/2024     No results found for: \"DINORAH\", \"RF\", \"SEDRATE\"   No results found for: \"CRP\"   No results found for: \"IRON\", \"TIBC\", \"FERRITIN\"   Lab Results   Component Value Date    RISZRSSA61 410 05/15/2024            Assessment & Plan      Medications        Problem List         LOS      Health maintenance.  Doing well, recommend up to date tetanus at [facility].  Discussed coated baby aspirin daily.  Discussed calcium and vitamin D.  Discussed health maintenance, screening test, lifestyle modification.  Pap benign 2016 repeat sent today.  Bradycardia.  Clinically improved currently, no further episodes.  History of 2 episodes in the past 6 months since heart rates down to 39 briefly, symptomatic.  Recently completed course herceptin, echo benign 2/20.    Has had eval per Dr. Odom, stress testing has been recommended, she declines this currently.  Breast cancer.  Clinically improved.  Has completed course chemotherapy.  Followed by oncology Dr. Valenzuela.    Diagnostic mammogram/breast ultrasound benign 6/21, mammogram benign 6/23.  Breast exam benign today.  Tobacco use.  Congrats on quitting 12/21.  Encourage cessation.  Recommend CT scan chest screening for lung cancer she declines but states she will consider next year.  PET CT with granuloma by 1 only 2019.  Recommend treadmill echocardiogram, carotid Doppler she declines.  Recommend DEXA scan she declines.  Hyperlipidemia.  Clinically improved today, tolerating rosuvastatin 5 mg every other day, recheck fasting " labs.  Moderate elevation.  Discussed diet, exercise, lifestyle modification.  Follow-up recheck.  Recheck levels.  Consider add statin if peristent elevatin.  Family history CVA.  Her mom.  Recommend carotid Doppler she declines.  Stop smoking.  Colon screening.  Colguard benign 2022.  Knee pain.  Likely secondary to osteoarthritis, ice, rehabilitation exercise discussed.  Start glucosamine/chondroitin.  Tylenol as needed.  Follow-up recheck.  Consider further eval if persistent or worsening symptoms.        Diagnoses and all orders for this visit:    1. Pain in other joint (Primary)    2. Acute pain of left knee    3. Mixed hyperlipidemia  -     Lipid Panel With / Chol / HDL Ratio    4. Screening mammogram for breast cancer  -     Mammo Screening Digital Tomosynthesis Bilateral With CAD; Future    5. Encounter for screening mammogram for malignant neoplasm of breast    6. Malaise and fatigue  -     CBC & Differential  -     Comprehensive Metabolic Panel    7. Abnormal TSH  -     TSH  -     T4, Free    8. Anemia, unspecified type  -     Vitamin B12  -     Folate    9. Vitamin D deficiency  -     Vitamin D,25-Hydroxy              Expected course, medications, and adverse effects discussed.  Call or return if worsening or persistent symptoms.  I wore protective equipment throughout this patient encounter including a mask, gloves, and eye protection.  Hand hygiene was performed before donning protective equipment and after removal when leaving the room. The complete contents of the Assessment and Plan and Data/Lab Results as documented above have been reviewed and addressed by myself with the patient today as part of an ongoing evaluation / treatment plan.  If some of the documentation has been copied from a previous note and is unchanged it indicates that this problem / plan has been assessed today but is stable from a previous visit and no changes have been recommended.

## 2024-05-15 ENCOUNTER — OFFICE VISIT (OUTPATIENT)
Dept: FAMILY MEDICINE CLINIC | Facility: CLINIC | Age: 64
End: 2024-05-15
Payer: COMMERCIAL

## 2024-05-15 VITALS
WEIGHT: 112.6 LBS | OXYGEN SATURATION: 96 % | DIASTOLIC BLOOD PRESSURE: 78 MMHG | HEIGHT: 65 IN | SYSTOLIC BLOOD PRESSURE: 128 MMHG | BODY MASS INDEX: 18.76 KG/M2 | TEMPERATURE: 97.5 F | RESPIRATION RATE: 18 BRPM | HEART RATE: 95 BPM

## 2024-05-15 DIAGNOSIS — Z12.31 SCREENING MAMMOGRAM FOR BREAST CANCER: ICD-10-CM

## 2024-05-15 DIAGNOSIS — R79.89 ABNORMAL TSH: ICD-10-CM

## 2024-05-15 DIAGNOSIS — Z12.31 ENCOUNTER FOR SCREENING MAMMOGRAM FOR MALIGNANT NEOPLASM OF BREAST: ICD-10-CM

## 2024-05-15 DIAGNOSIS — D64.9 ANEMIA, UNSPECIFIED TYPE: ICD-10-CM

## 2024-05-15 DIAGNOSIS — R53.83 MALAISE AND FATIGUE: ICD-10-CM

## 2024-05-15 DIAGNOSIS — M25.562 ACUTE PAIN OF LEFT KNEE: ICD-10-CM

## 2024-05-15 DIAGNOSIS — E78.2 MIXED HYPERLIPIDEMIA: ICD-10-CM

## 2024-05-15 DIAGNOSIS — M25.59 PAIN IN OTHER JOINT: Primary | ICD-10-CM

## 2024-05-15 DIAGNOSIS — R53.81 MALAISE AND FATIGUE: ICD-10-CM

## 2024-05-15 DIAGNOSIS — E55.9 VITAMIN D DEFICIENCY: ICD-10-CM

## 2024-05-15 PROCEDURE — 99213 OFFICE O/P EST LOW 20 MIN: CPT | Performed by: FAMILY MEDICINE

## 2024-05-16 LAB
25(OH)D3+25(OH)D2 SERPL-MCNC: 28 NG/ML (ref 30–100)
ALBUMIN SERPL-MCNC: 4.8 G/DL (ref 3.9–4.9)
ALBUMIN/GLOB SERPL: 1.8 {RATIO} (ref 1.2–2.2)
ALP SERPL-CCNC: 77 IU/L (ref 44–121)
ALT SERPL-CCNC: 14 IU/L (ref 0–32)
AST SERPL-CCNC: 23 IU/L (ref 0–40)
BASOPHILS # BLD AUTO: 0 X10E3/UL (ref 0–0.2)
BASOPHILS NFR BLD AUTO: 1 %
BILIRUB SERPL-MCNC: 0.5 MG/DL (ref 0–1.2)
BUN SERPL-MCNC: 16 MG/DL (ref 8–27)
BUN/CREAT SERPL: 17 (ref 12–28)
CALCIUM SERPL-MCNC: 10.6 MG/DL (ref 8.7–10.3)
CHLORIDE SERPL-SCNC: 100 MMOL/L (ref 96–106)
CHOLEST SERPL-MCNC: 181 MG/DL (ref 100–199)
CHOLEST/HDLC SERPL: 2.8 RATIO (ref 0–4.4)
CO2 SERPL-SCNC: 27 MMOL/L (ref 20–29)
CREAT SERPL-MCNC: 0.95 MG/DL (ref 0.57–1)
EGFRCR SERPLBLD CKD-EPI 2021: 68 ML/MIN/1.73
EOSINOPHIL # BLD AUTO: 0.2 X10E3/UL (ref 0–0.4)
EOSINOPHIL NFR BLD AUTO: 2 %
ERYTHROCYTE [DISTWIDTH] IN BLOOD BY AUTOMATED COUNT: 13.1 % (ref 11.7–15.4)
FOLATE SERPL-MCNC: 7.6 NG/ML
GLOBULIN SER CALC-MCNC: 2.7 G/DL (ref 1.5–4.5)
GLUCOSE SERPL-MCNC: 87 MG/DL (ref 70–99)
HCT VFR BLD AUTO: 47.7 % (ref 34–46.6)
HDLC SERPL-MCNC: 64 MG/DL
HGB BLD-MCNC: 15.4 G/DL (ref 11.1–15.9)
IMM GRANULOCYTES # BLD AUTO: 0 X10E3/UL (ref 0–0.1)
IMM GRANULOCYTES NFR BLD AUTO: 0 %
LDLC SERPL CALC-MCNC: 103 MG/DL (ref 0–99)
LYMPHOCYTES # BLD AUTO: 1.8 X10E3/UL (ref 0.7–3.1)
LYMPHOCYTES NFR BLD AUTO: 24 %
MCH RBC QN AUTO: 30 PG (ref 26.6–33)
MCHC RBC AUTO-ENTMCNC: 32.3 G/DL (ref 31.5–35.7)
MCV RBC AUTO: 93 FL (ref 79–97)
MONOCYTES # BLD AUTO: 0.5 X10E3/UL (ref 0.1–0.9)
MONOCYTES NFR BLD AUTO: 7 %
NEUTROPHILS # BLD AUTO: 4.9 X10E3/UL (ref 1.4–7)
NEUTROPHILS NFR BLD AUTO: 66 %
PLATELET # BLD AUTO: 216 X10E3/UL (ref 150–450)
POTASSIUM SERPL-SCNC: 5 MMOL/L (ref 3.5–5.2)
PROT SERPL-MCNC: 7.5 G/DL (ref 6–8.5)
RBC # BLD AUTO: 5.13 X10E6/UL (ref 3.77–5.28)
SODIUM SERPL-SCNC: 143 MMOL/L (ref 134–144)
T4 FREE SERPL-MCNC: 1.45 NG/DL (ref 0.82–1.77)
TRIGL SERPL-MCNC: 76 MG/DL (ref 0–149)
TSH SERPL DL<=0.005 MIU/L-ACNC: 1.92 UIU/ML (ref 0.45–4.5)
VIT B12 SERPL-MCNC: 410 PG/ML (ref 232–1245)
VLDLC SERPL CALC-MCNC: 14 MG/DL (ref 5–40)
WBC # BLD AUTO: 7.4 X10E3/UL (ref 3.4–10.8)

## 2024-06-03 ENCOUNTER — TRANSCRIBE ORDERS (OUTPATIENT)
Dept: ADMINISTRATIVE | Facility: HOSPITAL | Age: 64
End: 2024-06-03
Payer: COMMERCIAL

## 2024-06-03 ENCOUNTER — TELEPHONE (OUTPATIENT)
Dept: FAMILY MEDICINE CLINIC | Facility: CLINIC | Age: 64
End: 2024-06-03
Payer: COMMERCIAL

## 2024-06-03 DIAGNOSIS — Z12.31 SCREENING MAMMOGRAM FOR BREAST CANCER: Primary | ICD-10-CM

## 2024-06-03 NOTE — TELEPHONE ENCOUNTER
----- Message from Tin Eli sent at 6/3/2024 11:13 AM EDT -----  Let her know her blood work overall looks good cholesterol much improved cholesterol medication, her LDL almost normal, keep working on diet and, exercise, otherwise blood count, kidney, liver, thyroid, B12 levels all normal, her vitamin D level is just mildly low, check and see if she taking a vitamin D supplement/how much is she taking?  She should take the regular calcium and vitamin D and additional 4000 units of OTC vitamin D daily, we will recheck her blood work in 1 year, add vitamin D to her upcoming blood work, thanks

## 2024-06-03 NOTE — TELEPHONE ENCOUNTER
I called and spoke with Lisa. She is currently not taking a Vitamin D supplement, but will start taking this medication.

## 2024-07-22 ENCOUNTER — HOSPITAL ENCOUNTER (OUTPATIENT)
Dept: MAMMOGRAPHY | Facility: HOSPITAL | Age: 64
Discharge: HOME | End: 2024-07-22
Admitting: FAMILY MEDICINE
Payer: COMMERCIAL

## 2024-07-22 DIAGNOSIS — Z12.31 SCREENING MAMMOGRAM FOR BREAST CANCER: ICD-10-CM

## 2024-07-22 PROCEDURE — 77063 BREAST TOMOSYNTHESIS BI: CPT

## 2024-07-22 PROCEDURE — 77067 SCR MAMMO BI INCL CAD: CPT

## 2024-08-28 NOTE — PROGRESS NOTES
Subjective   Lisa Magallanes is a 64 y.o. female.     Chief Complaint   Patient presents with    Annual Exam    Hyperlipidemia       The patient is here: to discuss health maintenance and disease prevention to follow up on multiple medical problems.  Last comprehensive physical was on 8/7/2023.  Previous physical was performed by  Tin Eli MD  Overall has: moderate activity with work/home activities, exercises 2 - 3 times per week, good appetite, feels well with no complaints, good energy level, is sleeping well, and returned to full activity. Nutrition: appropriate balanced diet and eating a variety of foods. Last tetanus shot was unknown. Patient is doing routine self breast exams: occasionally    Hyperlipidemia  This is a chronic problem. The current episode started more than 1 year ago. Recent lipid tests were reviewed and are high. She has no history of diabetes, hypothyroidism or nephrotic syndrome. Pertinent negatives include no chest pain, focal sensory loss, focal weakness or shortness of breath. Current antihyperlipidemic treatment includes diet change and exercise. There are no compliance problems.  Risk factors for coronary artery disease include dyslipidemia.        Recent Hospitalizations:  No hospitalization(s) within the last year..  ccc      I personally reviewed and updated the patient's allergies, medications, problem list, and past medical, surgical, social, and family history. I have reviewed and confirmed the accuracy of the HPI and ROS as documented by the MA/LPN/RN Tin Eli MD    Family History   Problem Relation Age of Onset    Breast cancer Sister 58    Heart disease Mother     Stroke Mother        Social History     Tobacco Use    Smoking status: Former     Current packs/day: 0.00     Average packs/day: 0.3 packs/day for 12.0 years (3.0 ttl pk-yrs)     Types: Cigarettes     Start date: 1/1/2009     Quit date: 1/1/2021     Years since quitting: 3.7     Passive exposure:  Past    Smokeless tobacco: Never   Vaping Use    Vaping status: Some Days    Start date: 2022    Substances: Nicotine    Devices: Disposable   Substance Use Topics    Alcohol use: No    Drug use: No       Past Surgical History:   Procedure Laterality Date    BREAST BIOPSY Left 2018    stereo and core    BREAST BIOPSY Right 2018    x 2     SECTION       SECTION      MASTECTOMY Left 2019    POSTPARTUM TUBAL LIGATION      TUBAL ABDOMINAL LIGATION      VEIN LIGATION      VENOUS ACCESS DEVICE (PORT) INSERTION Right 2019    VENOUS ACCESS DEVICE (PORT) REMOVAL Right 2019    Procedure: REMOVAL VENOUS ACCESS DEVICE;  Surgeon: Levy Mayfield MD;  Location: [facility] MAIN OR;  Service: General       Patient Active Problem List   Diagnosis    Infiltrating ductal carcinoma of breast    Ductal carcinoma in situ (DCIS) of left breast    Generalized osteoarthrosis, unspecified site    Mixed hyperlipidemia    Malaise and fatigue    Infiltrating ductal carcinoma of left breast    Annual visit for general adult medical examination with abnormal findings    Allergic rhinitis    Abnormal TSH    Carpal tunnel syndrome    Fibroadenoma    Sleep disorder    Underweight    Tobacco use    Other chest pain    Estrogen receptor negative    HER2-positive carcinoma of left breast    Colon cancer screening         Current Outpatient Medications:     aspirin 81 MG chewable tablet, Chew 1 tablet Daily., Disp: , Rfl:     rosuvastatin (CRESTOR) 5 MG tablet, Take 1 tablet by mouth Daily., Disp: 90 tablet, Rfl: 3    Review of Systems   Constitutional:  Negative for chills and diaphoresis.   HENT:  Negative for trouble swallowing and voice change.    Eyes:  Negative for visual disturbance.   Respiratory:  Negative for shortness of breath.    Cardiovascular:  Negative for chest pain and palpitations.   Gastrointestinal:  Negative for abdominal pain and nausea.   Endocrine: Negative for polydipsia and polyphagia.  "  Genitourinary:  Negative for hematuria.   Musculoskeletal:  Negative for neck stiffness.   Skin:  Negative for color change and pallor.   Allergic/Immunologic: Negative for immunocompromised state.   Neurological:  Negative for dizziness, focal weakness, seizures, syncope and headache.   Hematological:  Negative for adenopathy.   Psychiatric/Behavioral:  Negative for hallucinations, sleep disturbance and suicidal ideas.        I have reviewed and confirmed the accuracy of the ROS as documented by the MA/LPN/RN Tin Eli MD      Objective   /80 (BP Location: Right arm, Patient Position: Sitting, Cuff Size: Adult)   Pulse 70   Temp 98.2 °F (36.8 °C) (Temporal)   Resp 18   Ht 165.1 cm (65\")   Wt 51.2 kg (112 lb 12.8 oz)   LMP 09/30/2009   SpO2 97%   BMI 18.77 kg/m²   BP Readings from Last 3 Encounters:   09/04/24 122/80   05/15/24 128/78   08/07/23 138/84     Wt Readings from Last 3 Encounters:   09/04/24 51.2 kg (112 lb 12.8 oz)   05/15/24 51.1 kg (112 lb 9.6 oz)   08/07/23 53.6 kg (118 lb 3.2 oz)     Physical Exam  Constitutional:       Appearance: Normal appearance. She is well-developed. She is not diaphoretic.   HENT:      Right Ear: Hearing, tympanic membrane, ear canal and external ear normal.      Left Ear: Hearing, tympanic membrane, ear canal and external ear normal.      Nose: Nose normal. No mucosal edema or congestion.      Right Sinus: No maxillary sinus tenderness or frontal sinus tenderness.      Left Sinus: No maxillary sinus tenderness or frontal sinus tenderness.      Mouth/Throat:      Mouth: No oral lesions.      Pharynx: Uvula midline. No oropharyngeal exudate or posterior oropharyngeal erythema.      Tonsils: No tonsillar exudate.   Cardiovascular:      Rate and Rhythm: Normal rate and regular rhythm.      Pulses: Normal pulses.      Heart sounds: Normal heart sounds, S1 normal and S2 normal. No murmur heard.     No friction rub. No gallop.   Pulmonary:      Effort: Pulmonary " "effort is normal. No accessory muscle usage.      Breath sounds: Normal breath sounds. No stridor. No decreased breath sounds, wheezing, rhonchi or rales.   Abdominal:      General: Bowel sounds are normal. There is no distension.      Palpations: Abdomen is soft. Abdomen is not rigid. There is no mass or pulsatile mass.      Tenderness: There is no abdominal tenderness. There is no guarding or rebound. Negative signs include Farris's sign.      Hernia: No hernia is present.   Skin:     General: Skin is warm and dry.      Coloration: Skin is not pale.   Neurological:      Mental Status: She is alert and oriented to person, place, and time.      Coordination: Coordination normal.      Gait: Gait normal.         Data / Lab Results:    No results found for: \"HGBA1C\"     Lab Results   Component Value Date     (H) 05/15/2024     (H) 08/07/2023     (H) 08/01/2022     Lab Results   Component Value Date    CHOL 245 (H) 10/29/2018    CHOL 266 (H) 09/05/2017    CHOL 247 (H) 02/08/2016     Lab Results   Component Value Date    TRIG 76 05/15/2024    TRIG 120 08/07/2023    TRIG 94 08/01/2022     Lab Results   Component Value Date    HDL 64 05/15/2024    HDL 61 08/07/2023    HDL 60 08/01/2022     No results found for: \"PSA\"  Lab Results   Component Value Date    WBC 7.4 05/15/2024    HGB 15.4 05/15/2024    HCT 47.7 (H) 05/15/2024    MCV 93 05/15/2024     05/15/2024     Lab Results   Component Value Date    TSH 1.920 05/15/2024     Lab Results   Component Value Date    GLUCOSE 87 05/15/2024    BUN 16 05/15/2024    CREATININE 0.95 05/15/2024    EGFRIFNONA 61 12/18/2021    EGFRIFAFRI 70 12/18/2021    BCR 17 05/15/2024    K 5.0 05/15/2024    CO2 27 05/15/2024    CALCIUM 10.6 (H) 05/15/2024    PROTENTOTREF 7.5 05/15/2024    ALBUMIN 4.8 05/15/2024    LABIL2 1.8 05/15/2024    AST 23 05/15/2024    ALT 14 05/15/2024     No results found for: \"DINORAH\", \"RF\", \"SEDRATE\"   No results found for: \"CRP\"   No results " "found for: \"IRON\", \"TIBC\", \"FERRITIN\"   Lab Results   Component Value Date    TWRUGCUO23 410 05/15/2024        Age-appropriate Screening Schedule:  Refer to the list below for future screening recommendations based on patient's age, sex and/or medical conditions. Orders for these recommended tests are listed in the plan section. The patient has been provided with a written plan.    Health Maintenance   Topic Date Due    TDAP/TD VACCINES (1 - Tdap) Never done    ZOSTER VACCINE (1 of 2) Never done    COLORECTAL CANCER SCREENING  01/05/2024    COVID-19 Vaccine (3 - 2023-24 season) 09/01/2024    INFLUENZA VACCINE  08/01/2024    HEPATITIS C SCREENING  09/04/2025 (Originally 2/12/2019)    LIPID PANEL  05/15/2025    MAMMOGRAM  07/22/2025    ANNUAL PHYSICAL  09/04/2025    PAP SMEAR  08/07/2026    Pneumococcal Vaccine 0-64  Aged Out           Assessment & Plan      Medications        Problem List         LOS    Physical..  Doing well, recommend up to date tetanus at [facility].  Discussed coated baby aspirin daily.  Recommend calcium supplementation she cannot tolerate..  Discussed health maintenance, screening test, lifestyle modification.  Pap benign 2023, 2016.  Vitamin D deficiency.  Replacing, follow-up recheck levels.  Bradycardia.  Clinically improved currently, no further episodes.  History of 2 episodes in the past 6 months since heart rates down to 39 briefly, symptomatic.  Recently completed course herceptin, echo benign 2/20.    Has had eval per Dr. Odom, stress testing has been recommended, she declines this currently.  Breast cancer.  Clinically improved.  Has completed course chemotherapy.  Followed by oncology Dr. Valenzuela.    Diagnostic mammogram/breast ultrasound benign 6/21, mammogram benign 6/24.  Breast exam benign today.  Tobacco use.  Congrats on quitting 12/21.  Encourage cessation.  Recommend CT scan chest screening for lung cancer she declines but states she will consider next year.  PET CT with " granuloma by 1 only 2019.  Recommend treadmill echocardiogram, carotid Doppler she declines.  Recommend DEXA scan she declines.  Hyperlipidemia.  Clinically improved today, tolerating rosuvastatin 5 mg every other day, LDL much improved at 103..  Moderate elevation.  Discussed diet, exercise, lifestyle modification.  Follow-up recheck.  Recheck levels.  Consider add statin if peristent elevatin.  Family history CVA.  Her mom.  Recommend carotid Doppler she declines.  Stop smoking.  Colon screening.  Colguard benign 2021,.  Recommend repeat she declines.  Consider colonoscopy.  Follow-up recheck.  Knee pain.  Likely secondary to osteoarthritis, ice, rehabilitation exercise discussed.  Start glucosamine/chondroitin.  Tylenol as needed.  Follow-up recheck.  Consider further eval if persistent or worsening symptoms.    Previous Exams:  PAP Smear was on 8/7/2023 by Dr Eli.    · Normal    Mammogram was on 7/22/2024 ordered by Dr Eli.    · No mammographic signs of malignancy in the right breast.    · Recommend  routine mammographic screening.   · BI-RADS 2. Benign findings.   · Recommended repeat in 1 year    ColoGuard was on 1/5/2021 ordered by Dr Eli.    · Impression of Normal   · Recommended repeat in 3 years    EKG was on 9/25/2020 ordered by Dr Odom.    · Sinus bradycardia with heart rate of 57 bpm nonspecific ST changes were noted.    Holter Monitor was on 9/16/2020 ordered by Dr Eli.    · An abnormal monitor study.    Echocardiogram was on 3/7/2020 ordered by HOLLY Borjas.    · Estimated EF = 55%.   · Left ventricular systolic function is normal.   · Left atrial cavity size is mild-to-moderately dilated.      Diagnoses and all orders for this visit:    1. Annual visit for general adult medical examination with abnormal findings (Primary)  -     Comprehensive Metabolic Panel; Future  -     CBC & Differential; Future  -     TSH; Future  -     Lipid Panel With / Chol / HDL Ratio; Future  -      Vitamin D,25-Hydroxy; Future    2. Mixed hyperlipidemia  -     rosuvastatin (CRESTOR) 5 MG tablet; Take 1 tablet by mouth Daily.  Dispense: 90 tablet; Refill: 3  -     Comprehensive Metabolic Panel; Future  -     TSH; Future  -     Lipid Panel With / Chol / HDL Ratio; Future    3. Tobacco use    4. Encounter for screening mammogram for malignant neoplasm of breast    5. Screening for malignant neoplasm of colon    6. Screening for malignant neoplasm of cervix    7. Malaise and fatigue  -     Comprehensive Metabolic Panel; Future  -     CBC & Differential; Future  -     TSH; Future  -     Lipid Panel With / Chol / HDL Ratio; Future  -     Vitamin D,25-Hydroxy; Future    8. Vitamin D deficiency  -     Vitamin D,25-Hydroxy; Future    9. Abnormal TSH  -     TSH; Future    10. Infiltrating ductal carcinoma of left breast              Expected course, medications, and adverse effects discussed.  Call or return if worsening or persistent symptoms.  I wore protective equipment throughout this patient encounter including a mask, gloves, and eye protection.  Hand hygiene was performed before donning protective equipment and after removal when leaving the room. The complete contents of the Assessment and Plan and Data / Lab Results as documented above have been reviewed and addressed by myself with the patient today as part of an ongoing evaluation / treatment plan.  If some of the documentation has been copied from a previous note and is unchanged it indicates that this problem / plan has been assessed today but is stable from a previous visit and no changes have been recommended.  The separate E/M service provided today is significant, medically necessary, and separately identifiable.

## 2024-09-04 ENCOUNTER — OFFICE VISIT (OUTPATIENT)
Dept: FAMILY MEDICINE CLINIC | Facility: CLINIC | Age: 64
End: 2024-09-04
Payer: COMMERCIAL

## 2024-09-04 VITALS
SYSTOLIC BLOOD PRESSURE: 122 MMHG | DIASTOLIC BLOOD PRESSURE: 80 MMHG | WEIGHT: 112.8 LBS | HEART RATE: 70 BPM | TEMPERATURE: 98.2 F | RESPIRATION RATE: 18 BRPM | BODY MASS INDEX: 18.8 KG/M2 | HEIGHT: 65 IN | OXYGEN SATURATION: 97 %

## 2024-09-04 DIAGNOSIS — Z00.01 ANNUAL VISIT FOR GENERAL ADULT MEDICAL EXAMINATION WITH ABNORMAL FINDINGS: Primary | ICD-10-CM

## 2024-09-04 DIAGNOSIS — E78.2 MIXED HYPERLIPIDEMIA: ICD-10-CM

## 2024-09-04 DIAGNOSIS — R79.89 ABNORMAL TSH: ICD-10-CM

## 2024-09-04 DIAGNOSIS — R53.81 MALAISE AND FATIGUE: ICD-10-CM

## 2024-09-04 DIAGNOSIS — Z12.31 ENCOUNTER FOR SCREENING MAMMOGRAM FOR MALIGNANT NEOPLASM OF BREAST: ICD-10-CM

## 2024-09-04 DIAGNOSIS — Z12.4 SCREENING FOR MALIGNANT NEOPLASM OF CERVIX: ICD-10-CM

## 2024-09-04 DIAGNOSIS — Z72.0 TOBACCO USE: ICD-10-CM

## 2024-09-04 DIAGNOSIS — R53.83 MALAISE AND FATIGUE: ICD-10-CM

## 2024-09-04 DIAGNOSIS — Z12.11 SCREENING FOR MALIGNANT NEOPLASM OF COLON: ICD-10-CM

## 2024-09-04 DIAGNOSIS — E55.9 VITAMIN D DEFICIENCY: ICD-10-CM

## 2024-09-04 DIAGNOSIS — C50.912 INFILTRATING DUCTAL CARCINOMA OF LEFT BREAST: ICD-10-CM

## 2024-09-04 RX ORDER — ROSUVASTATIN CALCIUM 5 MG/1
5 TABLET, COATED ORAL DAILY
Qty: 90 TABLET | Refills: 3 | Status: SHIPPED | OUTPATIENT
Start: 2024-09-04

## 2024-09-04 NOTE — LETTER
September 4, 2024    [facility]  [address]  [place] [address]                                Tin Eli MD

## 2025-01-23 NOTE — TELEPHONE ENCOUNTER
Attempted to call pt and let her know that we have sent referral to have her port removed and replaced.  No answer and no vm set up   Winlevi Pregnancy And Lactation Text: This medication is considered safe during pregnancy and breastfeeding. Tazorac Counseling:  Patient advised that medication is irritating and drying.  Patient may need to apply sparingly and wash off after an hour before eventually leaving it on overnight.  The patient verbalized understanding of the proper use and possible adverse effects of tazorac.  All of the patient's questions and concerns were addressed. Use Enhanced Medication Counseling?: No Aklief Pregnancy And Lactation Text: It is unknown if this medication is safe to use during pregnancy.  It is unknown if this medication is excreted in breast milk.  Breastfeeding women should use the topical cream on the smallest area of the skin for the shortest time needed while breastfeeding.  Do not apply to nipple and areola. Sarecycline Counseling: Patient advised regarding possible photosensitivity and discoloration of the teeth, skin, lips, tongue and gums.  Patient instructed to avoid sunlight, if possible.  When exposed to sunlight, patients should wear protective clothing, sunglasses, and sunscreen.  The patient was instructed to call the office immediately if the following severe adverse effects occur:  hearing changes, easy bruising/bleeding, severe headache, or vision changes.  The patient verbalized understanding of the proper use and possible adverse effects of sarecycline.  All of the patient's questions and concerns were addressed. Erythromycin Pregnancy And Lactation Text: This medication is Pregnancy Category B and is considered safe during pregnancy. It is also excreted in breast milk. Doxycycline Pregnancy And Lactation Text: This medication is Pregnancy Category D and not consider safe during pregnancy. It is also excreted in breast milk but is considered safe for shorter treatment courses. Minocycline Counseling: Patient advised regarding possible photosensitivity and discoloration of the teeth, skin, lips, tongue and gums.  Patient instructed to avoid sunlight, if possible.  When exposed to sunlight, patients should wear protective clothing, sunglasses, and sunscreen.  The patient was instructed to call the office immediately if the following severe adverse effects occur:  hearing changes, easy bruising/bleeding, severe headache, or vision changes.  The patient verbalized understanding of the proper use and possible adverse effects of minocycline.  All of the patient's questions and concerns were addressed. Tetracycline Pregnancy And Lactation Text: This medication is Pregnancy Category D and not consider safe during pregnancy. It is also excreted in breast milk. Topical Retinoid counseling:  Patient advised to apply a pea-sized amount only at bedtime and wait 30 minutes after washing their face before applying.  If too drying, patient may add a non-comedogenic moisturizer. The patient verbalized understanding of the proper use and possible adverse effects of retinoids.  All of the patient's questions and concerns were addressed. Topical Sulfur Applications Pregnancy And Lactation Text: This medication is Pregnancy Category C and has an unknown safety profile during pregnancy. It is unknown if this topical medication is excreted in breast milk. Bactrim Counseling:  I discussed with the patient the risks of sulfa antibiotics including but not limited to GI upset, allergic reaction, drug rash, diarrhea, dizziness, photosensitivity, and yeast infections.  Rarely, more serious reactions can occur including but not limited to aplastic anemia, agranulocytosis, methemoglobinemia, blood dyscrasias, liver or kidney failure, lung infiltrates or desquamative/blistering drug rashes. Azithromycin Counseling:  I discussed with the patient the risks of azithromycin including but not limited to GI upset, allergic reaction, drug rash, diarrhea, and yeast infections. Spironolactone Pregnancy And Lactation Text: This medication can cause feminization of the male fetus and should be avoided during pregnancy. The active metabolite is also found in breast milk. Benzoyl Peroxide Counseling: Patient counseled that medicine may cause skin irritation and bleach clothing.  In the event of skin irritation, the patient was advised to reduce the amount of the drug applied or use it less frequently.   The patient verbalized understanding of the proper use and possible adverse effects of benzoyl peroxide.  All of the patient's questions and concerns were addressed. Topical Clindamycin Pregnancy And Lactation Text: This medication is Pregnancy Category B and is considered safe during pregnancy. It is unknown if it is excreted in breast milk. High Dose Vitamin A Counseling: Side effects reviewed, pt to contact office should one occur. Dapsone Pregnancy And Lactation Text: This medication is Pregnancy Category C and is not considered safe during pregnancy or breast feeding. Aklief counseling:  Patient advised to apply a pea-sized amount only at bedtime and wait 30 minutes after washing their face before applying.  If too drying, patient may add a non-comedogenic moisturizer.  The most commonly reported side effects including irritation, redness, scaling, dryness, stinging, burning, itching, and increased risk of sunburn.  The patient verbalized understanding of the proper use and possible adverse effects of retinoids.  All of the patient's questions and concerns were addressed. Bactrim Pregnancy And Lactation Text: This medication is Pregnancy Category D and is known to cause fetal risk.  It is also excreted in breast milk. Patient Specific Counseling (Will Not Stick From Patient To Patient): Recommended Neutrogena acne wash for face and back. Patient counseled about using medications consistently. Erythromycin Counseling:  I discussed with the patient the risks of erythromycin including but not limited to GI upset, allergic reaction, drug rash, diarrhea, increase in liver enzymes, and yeast infections. Isotretinoin Counseling: Patient should get monthly blood tests, not donate blood, not drive at night if vision affected, not share medication, and not undergo elective surgery for 6 months after tx completed. Side effects reviewed, pt to contact office should one occur. Birth Control Pills Pregnancy And Lactation Text: This medication should be avoided if pregnant and for the first 30 days post-partum. Azelaic Acid Counseling: Patient counseled that medicine may cause skin irritation and to avoid applying near the eyes.  In the event of skin irritation, the patient was advised to reduce the amount of the drug applied or use it less frequently.   The patient verbalized understanding of the proper use and possible adverse effects of azelaic acid.  All of the patient's questions and concerns were addressed. Tazorac Pregnancy And Lactation Text: This medication is not safe during pregnancy. It is unknown if this medication is excreted in breast milk. Topical Retinoid Pregnancy And Lactation Text: This medication is Pregnancy Category C. It is unknown if this medication is excreted in breast milk. Azithromycin Pregnancy And Lactation Text: This medication is considered safe during pregnancy and is also secreted in breast milk. Winlevi Counseling:  I discussed with the patient the risks of topical clascoterone including but not limited to erythema, scaling, itching, and stinging. Patient voiced their understanding. Doxycycline Counseling:  Patient counseled regarding possible photosensitivity and increased risk for sunburn.  Patient instructed to avoid sunlight, if possible.  When exposed to sunlight, patients should wear protective clothing, sunglasses, and sunscreen.  The patient was instructed to call the office immediately if the following severe adverse effects occur:  hearing changes, easy bruising/bleeding, severe headache, or vision changes.  The patient verbalized understanding of the proper use and possible adverse effects of doxycycline.  All of the patient's questions and concerns were addressed. Topical Sulfur Applications Counseling: Topical Sulfur Counseling: Patient counseled that this medication may cause skin irritation or allergic reactions.  In the event of skin irritation, the patient was advised to reduce the amount of the drug applied or use it less frequently.   The patient verbalized understanding of the proper use and possible adverse effects of topical sulfur application.  All of the patient's questions and concerns were addressed. Tetracycline Counseling: Patient counseled regarding possible photosensitivity and increased risk for sunburn.  Patient instructed to avoid sunlight, if possible.  When exposed to sunlight, patients should wear protective clothing, sunglasses, and sunscreen.  The patient was instructed to call the office immediately if the following severe adverse effects occur:  hearing changes, easy bruising/bleeding, severe headache, or vision changes.  The patient verbalized understanding of the proper use and possible adverse effects of tetracycline.  All of the patient's questions and concerns were addressed. Patient understands to avoid pregnancy while on therapy due to potential birth defects. Benzoyl Peroxide Pregnancy And Lactation Text: This medication is Pregnancy Category C. It is unknown if benzoyl peroxide is excreted in breast milk. High Dose Vitamin A Pregnancy And Lactation Text: High dose vitamin A therapy is contraindicated during pregnancy and breast feeding. Dapsone Counseling: I discussed with the patient the risks of dapsone including but not limited to hemolytic anemia, agranulocytosis, rashes, methemoglobinemia, kidney failure, peripheral neuropathy, headaches, GI upset, and liver toxicity.  Patients who start dapsone require monitoring including baseline LFTs and weekly CBCs for the first month, then every month thereafter.  The patient verbalized understanding of the proper use and possible adverse effects of dapsone.  All of the patient's questions and concerns were addressed. Spironolactone Counseling: Patient advised regarding risks of diarrhea, abdominal pain, hyperkalemia, birth defects (for female patients), liver toxicity and renal toxicity. The patient may need blood work to monitor liver and kidney function and potassium levels while on therapy. The patient verbalized understanding of the proper use and possible adverse effects of spironolactone.  All of the patient's questions and concerns were addressed. Azelaic Acid Pregnancy And Lactation Text: This medication is considered safe during pregnancy and breast feeding. Birth Control Pills Counseling: Birth Control Pill Counseling: I discussed with the patient the potential side effects of OCPs including but not limited to increased risk of stroke, heart attack, thrombophlebitis, deep venous thrombosis, hepatic adenomas, breast changes, GI upset, headaches, and depression.  The patient verbalized understanding of the proper use and possible adverse effects of OCPs. All of the patient's questions and concerns were addressed. Isotretinoin Pregnancy And Lactation Text: This medication is Pregnancy Category X and is considered extremely dangerous during pregnancy. It is unknown if it is excreted in breast milk. Detail Level: Zone Topical Clindamycin Counseling: Patient counseled that this medication may cause skin irritation or allergic reactions.  In the event of skin irritation, the patient was advised to reduce the amount of the drug applied or use it less frequently.   The patient verbalized understanding of the proper use and possible adverse effects of clindamycin.  All of the patient's questions and concerns were addressed.

## 2025-06-24 ENCOUNTER — TRANSCRIBE ORDERS (OUTPATIENT)
Dept: ADMINISTRATIVE | Facility: HOSPITAL | Age: 65
End: 2025-06-24
Payer: COMMERCIAL

## 2025-06-24 DIAGNOSIS — Z12.31 VISIT FOR SCREENING MAMMOGRAM: Primary | ICD-10-CM

## 2025-07-23 ENCOUNTER — HOSPITAL ENCOUNTER (OUTPATIENT)
Dept: MAMMOGRAPHY | Facility: HOSPITAL | Age: 65
Discharge: HOME | End: 2025-07-23
Admitting: FAMILY MEDICINE
Payer: COMMERCIAL

## 2025-07-23 DIAGNOSIS — Z12.31 VISIT FOR SCREENING MAMMOGRAM: ICD-10-CM

## 2025-07-23 PROCEDURE — 77063 BREAST TOMOSYNTHESIS BI: CPT

## 2025-07-23 PROCEDURE — 77067 SCR MAMMO BI INCL CAD: CPT

## 2025-07-25 ENCOUNTER — RESULTS FOLLOW-UP (OUTPATIENT)
Dept: ADMINISTRATIVE | Facility: HOSPITAL | Age: 65
End: 2025-07-25
Payer: COMMERCIAL

## (undated) DEVICE — GLV SURG BIOGEL LTX PF 7

## (undated) DEVICE — UNDYED BRAIDED (POLYGLACTIN 910), SYNTHETIC ABSORBABLE SUTURE: Brand: COATED VICRYL

## (undated) DEVICE — PK MINOR LAPAROTOMY 50

## (undated) DEVICE — SKIN AFFIX SURG ADHESIVE 72/CS 0.55ML: Brand: MEDLINE

## (undated) DEVICE — SOL IRRIG H2O 1000ML STRL

## (undated) DEVICE — UNDERGLV SURG BIOGEL INDICATOR LTX PF 7

## (undated) DEVICE — SUT VIC 3/0 SH 27IN J416H